# Patient Record
Sex: FEMALE | Race: WHITE | Employment: OTHER | URBAN - METROPOLITAN AREA
[De-identification: names, ages, dates, MRNs, and addresses within clinical notes are randomized per-mention and may not be internally consistent; named-entity substitution may affect disease eponyms.]

---

## 2017-05-04 ENCOUNTER — HOSPITAL ENCOUNTER (OUTPATIENT)
Dept: LAB | Age: 67
Discharge: HOME | End: 2017-05-04

## 2017-05-04 PROCEDURE — 88305 TISSUE EXAM BY PATHOLOGIST: CPT | Performed by: OPHTHALMOLOGY

## 2017-08-29 ENCOUNTER — HOSPITAL ENCOUNTER (OUTPATIENT)
Dept: MAMMOGRAPHY | Age: 67
Discharge: HOME | End: 2017-08-29
Payer: MEDICARE

## 2017-08-29 DIAGNOSIS — Z12.31 VISIT FOR SCREENING MAMMOGRAM: ICD-10-CM

## 2017-08-29 PROCEDURE — G0202 SCR MAMMO BI INCL CAD: HCPCS

## 2018-09-04 ENCOUNTER — HOSPITAL ENCOUNTER (OUTPATIENT)
Dept: MAMMOGRAPHY | Age: 68
Discharge: HOME | End: 2018-09-04
Payer: MEDICARE

## 2018-09-04 DIAGNOSIS — Z12.31 VISIT FOR SCREENING MAMMOGRAM: ICD-10-CM

## 2018-09-04 PROCEDURE — 77067 SCR MAMMO BI INCL CAD: CPT

## 2018-09-18 ENCOUNTER — HOSPITAL ENCOUNTER (OUTPATIENT)
Dept: MAMMOGRAPHY | Age: 68
Discharge: HOME | End: 2018-09-18
Attending: OBSTETRICS & GYNECOLOGY
Payer: MEDICARE

## 2018-09-18 ENCOUNTER — HOSPITAL ENCOUNTER (OUTPATIENT)
Dept: ULTRASOUND IMAGING | Age: 68
Discharge: HOME | End: 2018-09-18
Attending: OBSTETRICS & GYNECOLOGY
Payer: MEDICARE

## 2018-09-18 DIAGNOSIS — R92.2 DENSE BREAST TISSUE ON MAMMOGRAM: ICD-10-CM

## 2018-09-18 PROCEDURE — 77065 DX MAMMO INCL CAD UNI: CPT

## 2019-09-23 ENCOUNTER — HOSPITAL ENCOUNTER (OUTPATIENT)
Dept: MAMMOGRAPHY | Age: 69
Discharge: HOME | End: 2019-09-23
Payer: MEDICARE

## 2019-09-23 DIAGNOSIS — Z12.39 SCREENING BREAST EXAMINATION: ICD-10-CM

## 2019-09-23 PROCEDURE — 77067 SCR MAMMO BI INCL CAD: CPT

## 2020-06-23 ENCOUNTER — OFFICE VISIT (OUTPATIENT)
Dept: URGENT CARE | Age: 70
End: 2020-06-23

## 2020-06-23 VITALS — HEART RATE: 69 BPM | RESPIRATION RATE: 16 BRPM | TEMPERATURE: 98.1 F | OXYGEN SATURATION: 96 %

## 2020-06-23 DIAGNOSIS — Z11.59 SCREENING FOR VIRAL DISEASE: ICD-10-CM

## 2020-06-23 DIAGNOSIS — R50.9 SUBJECTIVE FEVER: ICD-10-CM

## 2020-06-23 DIAGNOSIS — R06.02 SHORTNESS OF BREATH: ICD-10-CM

## 2020-06-23 DIAGNOSIS — R07.81 PLEURITIC CHEST PAIN: Primary | ICD-10-CM

## 2020-06-23 DIAGNOSIS — J02.9 SORE THROAT: ICD-10-CM

## 2020-06-23 RX ORDER — DOXYCYCLINE 100 MG/1
100 CAPSULE ORAL 2 TIMES DAILY
Qty: 20 CAP | Refills: 0 | Status: SHIPPED | OUTPATIENT
Start: 2020-06-23 | End: 2020-07-03

## 2020-06-23 RX ORDER — CARVEDILOL 12.5 MG/1
TABLET ORAL
COMMUNITY
Start: 2019-12-02

## 2020-06-23 RX ORDER — MELOXICAM 15 MG/1
15 TABLET ORAL DAILY
COMMUNITY

## 2020-06-23 RX ORDER — METFORMIN HYDROCHLORIDE 500 MG/1
TABLET ORAL
COMMUNITY

## 2020-06-23 RX ORDER — GUAIFENESIN 100 MG/5ML
81 LIQUID (ML) ORAL DAILY
COMMUNITY

## 2020-06-23 NOTE — PROGRESS NOTES
This patient was seen in [facility] at [address] [place] [address] Urgent Care while in their vehicle due to COVID-19 pandemic with PPE and focused examination in order to decrease community viral transmission. The patient/guardian gave verbal consent to treat. Ting Oswald is a 79 y.o. female who presents SOB, slight cough, and pain with coughing and deep breathing x 3 days. Reports subjective fever. No known COVID-19 contact. Denies dizziness, palpitations. PMH: MVP, HTN, GERD. Non-smoker. The history is provided by the patient.         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    CAD (coronary artery disease)     MVP    Fibromyalgia     GERD (gastroesophageal reflux disease)     Hypertension     Nausea & vomiting     Pancreatitis     Plantar fasciitis     Thyroid disease     TMJ (dislocation of temporomandibular joint)         Past Surgical History:   Procedure Laterality Date    COLONOSCOPY,DIAGNOSTIC  10/1/2015         HX APPENDECTOMY      HX CHOLECYSTECTOMY      HX GYN      d & c    HX ORTHOPAEDIC  2/16/15    LEFT TOTAL KNEE ARTHROPLASTY WITH NAVIGATION     HX TONSILLECTOMY      HX UROLOGICAL      suburethral sling    TMJ ARTHROSCOPY/SURGERY      TOTAL KNEE ARTHROPLASTY  2014/June    right         Family History   Problem Relation Age of Onset    Delayed Awakening Father     Breast Cancer Mother 58    Breast Cancer Maternal Grandmother         Social History     Socioeconomic History    Marital status: UNKNOWN     Spouse name: Not on file    Number of children: Not on file    Years of education: Not on file    Highest education level: Not on file   Occupational History    Not on file   Social Needs    Financial resource strain: Not on file    Food insecurity     Worry: Not on file     Inability: Not on file    Transportation needs     Medical: Not on file Non-medical: Not on file   Tobacco Use    Smoking status: Never Smoker    Smokeless tobacco: Never Used   Substance and Sexual Activity    Alcohol use: Yes     Alcohol/week: 2.5 standard drinks     Types: 3 Glasses of wine per week     Comment: 3 glasses week    Drug use: No    Sexual activity: Not on file   Lifestyle    Physical activity     Days per week: Not on file     Minutes per session: Not on file    Stress: Not on file   Relationships    Social connections     Talks on phone: Not on file     Gets together: Not on file     Attends Orthodox service: Not on file     Active member of club or organization: Not on file     Attends meetings of clubs or organizations: Not on file     Relationship status: Not on file    Intimate partner violence     Fear of current or ex partner: Not on file     Emotionally abused: Not on file     Physically abused: Not on file     Forced sexual activity: Not on file   Other Topics Concern    Not on file   Social History Narrative    Not on file                ALLERGIES: Codeine; Erythromycin; and Sulfa (sulfonamide antibiotics)    Review of Systems   Constitutional: Positive for fever. Negative for activity change, appetite change and chills. HENT: Negative for congestion, rhinorrhea and sore throat. Respiratory: Positive for cough, chest tightness and shortness of breath. Negative for wheezing. Cardiovascular: Positive for chest pain. Negative for palpitations. Gastrointestinal: Negative for abdominal pain, diarrhea, nausea and vomiting. Musculoskeletal: Negative for myalgias. Neurological: Negative for dizziness and headaches. Vitals:    06/23/20 1525   Pulse: 69   Resp: 16   Temp: 98.1 °F (36.7 °C)   SpO2: 96%       Physical Exam  Vitals signs and nursing note reviewed. Constitutional:       General: She is not in acute distress. Appearance: She is well-developed. She is not diaphoretic.    Pulmonary:      Effort: Pulmonary effort is normal. No respiratory distress. Breath sounds: Normal breath sounds. No stridor. No wheezing, rhonchi or rales. Chest:      Chest wall: Tenderness present. Neurological:      Mental Status: She is alert. Psychiatric:         Behavior: Behavior normal.         Thought Content: Thought content normal.         Judgment: Judgment normal.         [facility]    ICD-10-CM ICD-9-CM   1. Pleuritic chest pain R07.81 786.52   2. Shortness of breath R06.02 786.05   3. Sore throat J02.9 462   4. Subjective fever R50.9 780.60   5. Screening for viral disease Z11.59 V73.99       Orders Placed This Encounter    NOVEL CORONAVIRUS (COVID-19)     Scheduling Instructions:      1) Due to current limited availability of the COVID-19 PCR test, tests will be prioritized and may not be completed.              2) Order only if the test result will change clinical management or necessary for a return to mission-critical employment decision.              3) Print and instruct patient to adhere to [facility] home isolation program. (Link Above)              4) Set up or refer patient for a monitoring program.              5) Have patient sign up for and leverage MyChart (if not previously done). Order Specific Question:   Status     Answer:   Symptomatic/Infection Suspected    doxycycline (VIBRAMYCIN) 100 mg capsule     Sig: Take 1 Cap by mouth two (2) times a day for 10 days. Dispense:  20 Cap     Refill:  0      Possible bronchitis    Self Quarantine  Deep breathing exercises  Tylenol prn  Increase fluids    If signs and symptoms become worse the pt is to go to the ER.          Procedures

## 2020-06-26 LAB — SARS-COV-2, NAA: NOT DETECTED

## 2020-07-31 ENCOUNTER — HOSPITAL ENCOUNTER (EMERGENCY)
Age: 70
Discharge: HOME | End: 2020-07-31
Attending: EMERGENCY MEDICINE
Payer: MEDICARE

## 2020-07-31 ENCOUNTER — APPOINTMENT (OUTPATIENT)
Dept: CT IMAGING | Age: 70
End: 2020-07-31
Attending: EMERGENCY MEDICINE
Payer: MEDICARE

## 2020-07-31 ENCOUNTER — APPOINTMENT (OUTPATIENT)
Dept: GENERAL RADIOLOGY | Age: 70
End: 2020-07-31
Attending: EMERGENCY MEDICINE
Payer: MEDICARE

## 2020-07-31 VITALS
OXYGEN SATURATION: 98 % | TEMPERATURE: 97.5 F | HEIGHT: 63 IN | WEIGHT: 177.25 LBS | HEART RATE: 80 BPM | BODY MASS INDEX: 31.41 KG/M2 | RESPIRATION RATE: 16 BRPM | SYSTOLIC BLOOD PRESSURE: 121 MMHG | DIASTOLIC BLOOD PRESSURE: 84 MMHG

## 2020-07-31 DIAGNOSIS — M47.816 ARTHRITIS OF LUMBAR SPINE: ICD-10-CM

## 2020-07-31 DIAGNOSIS — R04.0 EPISTAXIS: ICD-10-CM

## 2020-07-31 DIAGNOSIS — S02.2XXA CLOSED FRACTURE OF NASAL SEPTUM, INITIAL ENCOUNTER: ICD-10-CM

## 2020-07-31 DIAGNOSIS — M47.812 CERVICAL ARTHRITIS: ICD-10-CM

## 2020-07-31 DIAGNOSIS — S02.2XXA CLOSED FRACTURE OF NASAL BONE, INITIAL ENCOUNTER: ICD-10-CM

## 2020-07-31 DIAGNOSIS — S09.90XA CLOSED HEAD INJURY, INITIAL ENCOUNTER: Primary | ICD-10-CM

## 2020-07-31 DIAGNOSIS — T14.8XXA ABRASION: ICD-10-CM

## 2020-07-31 PROCEDURE — 70486 CT MAXILLOFACIAL W/O DYE: CPT

## 2020-07-31 PROCEDURE — 72125 CT NECK SPINE W/O DYE: CPT

## 2020-07-31 PROCEDURE — 99283 EMERGENCY DEPT VISIT LOW MDM: CPT

## 2020-07-31 PROCEDURE — 74011250637 HC RX REV CODE- 250/637: Performed by: EMERGENCY MEDICINE

## 2020-07-31 PROCEDURE — 70450 CT HEAD/BRAIN W/O DYE: CPT

## 2020-07-31 PROCEDURE — 72100 X-RAY EXAM L-S SPINE 2/3 VWS: CPT

## 2020-07-31 RX ORDER — ONDANSETRON 4 MG/1
4 TABLET, ORALLY DISINTEGRATING ORAL
Status: COMPLETED | OUTPATIENT
Start: 2020-07-31 | End: 2020-07-31

## 2020-07-31 RX ORDER — METHOCARBAMOL 750 MG/1
750 TABLET, FILM COATED ORAL
Qty: 20 TAB | Refills: 0 | Status: SHIPPED | OUTPATIENT
Start: 2020-07-31

## 2020-07-31 RX ORDER — TRAMADOL HYDROCHLORIDE 50 MG/1
50 TABLET ORAL
Status: COMPLETED | OUTPATIENT
Start: 2020-07-31 | End: 2020-07-31

## 2020-07-31 RX ORDER — TRAMADOL HYDROCHLORIDE 50 MG/1
50 TABLET ORAL
Qty: 10 TAB | Refills: 0 | Status: SHIPPED | OUTPATIENT
Start: 2020-07-31 | End: 2020-08-03

## 2020-07-31 RX ADMIN — ONDANSETRON 4 MG: 4 TABLET, ORALLY DISINTEGRATING ORAL at 15:02

## 2020-07-31 RX ADMIN — TRAMADOL HYDROCHLORIDE 50 MG: 50 TABLET, FILM COATED ORAL at 15:02

## 2020-07-31 NOTE — ED NOTES
Pt in via EMS for GLF. Pt states she tripped over painting plastic and fell face first into the tailgate of her pickup truck. Pt state she has 6/10 pain in both the face and lower back from the direction of the fall. Pt has several small lacerations  to the bridge of the nose, forehead and upper lip. Pt is monitored X 2.

## 2020-07-31 NOTE — DISCHARGE INSTRUCTIONS
Patient Education        Scrapes (Abrasions): Care Instructions  Your Care Instructions  Scrapes (abrasions) are wounds where your skin has been rubbed or torn off. Most scrapes do not go deep into the skin, but some may remove several layers of skin. Scrapes usually don't bleed much, but they may ooze pinkish fluid. Scrapes on the head or face may appear worse than they are. They may bleed a lot because of the good blood supply to this area. Most scrapes heal well and may not need a bandage. They usually heal within 3 to 7 days. A large, deep scrape may take 1 to 2 weeks or longer to heal. A scab may form on some scrapes. Follow-up care is a key part of your treatment and safety. Be sure to make and go to all appointments, and call your doctor if you are having problems. It's also a good idea to know your test results and keep a list of the medicines you take. How can you care for yourself at home? · If your doctor told you how to care for your wound, follow your doctor's instructions. If you did not get instructions, follow this general advice:  ? Wash the scrape with clean water 2 times a day. Don't use hydrogen peroxide or alcohol, which can slow healing. ? You may cover the scrape with a thin layer of petroleum jelly, such as Vaseline, and a nonstick bandage. ? Apply more petroleum jelly and replace the bandage as needed. · Prop up the injured area on a pillow anytime you sit or lie down during the next 3 days. Try to keep it above the level of your heart. This will help reduce swelling. · Be safe with medicines. Take pain medicines exactly as directed. ? If the doctor gave you a prescription medicine for pain, take it as prescribed. ? If you are not taking a prescription pain medicine, ask your doctor if you can take an over-the-counter medicine. When should you call for help?    Call your doctor now or seek immediate medical care if:  · You have signs of infection, such as:  ? Increased pain, swelling, warmth, or redness around the scrape. ? Red streaks leading from the scrape. ? Pus draining from the scrape. ? A fever. · The scrape starts to bleed, and blood soaks through the bandage. Oozing small amounts of blood is normal.  Watch closely for changes in your health, and be sure to contact your doctor if the scrape is not getting better each day. Where can you learn more? Go to http://www.gray.com/  Enter A374 in the search box to learn more about \"Scrapes (Abrasions): Care Instructions. \"  Current as of: June 26, 2019               Content Version: 12.5  © 7702-5725 [employer]. Care instructions adapted under license by [employer] (which disclaims liability or warranty for this information). If you have questions about a medical condition or this instruction, always ask your healthcare professional. [place] [address] any warranty or liability for your use of this information. Patient Education        Cervical Spondylosis: Care Instructions  Your Care Instructions     Cervical spondylosis is a type of arthritis of the neck. It can happen as people get older. It may be caused by bone spurs or other problems. You may have neck pain and stiffness. Sometimes the space around the spinal cord narrows. When this happens, it is called spinal stenosis. Spinal stenosis can cause pain, numbness, or weakness in the arms, legs, feet, and rear end (buttocks). It can also cause loss of bowel and bladder control. You can treat some of your symptoms with over-the-counter pain medicine. But if you have spinal stenosis with severe symptoms, you may need surgery. Follow-up care is a key part of your treatment and safety. Be sure to make and go to all appointments, and call your doctor if you are having problems. It's also a good idea to know your test results and keep a list of the medicines you take.   How can you care for yourself at home?  · Take anti-inflammatory medicines to reduce neck pain. These include ibuprofen (Advil, Motrin) and naproxen (Aleve). Be safe with medicines. Read and follow all instructions on the label. · Follow your doctor's recommendation about activity. He or she may tell you not to do sports or activities that could injure your neck. When should you call for help? [id number] anytime you think you may need emergency care. For example, call if:  · You are unable to move an arm or a leg at all. Call your doctor now or seek immediate medical care if:  · You have new or worse symptoms in your arms, legs, belly, or buttocks. Symptoms may include:  ? Numbness or tingling. ? Weakness. ? Pain. · You lose bladder or bowel control. Watch closely for changes in your health, and be sure to contact your doctor if:  · You do not get better as expected. Where can you learn more? Go to http://jenniferMWImacrina.info/  Enter Y307 in the search box to learn more about \"Cervical Spondylosis: Care Instructions. \"  Current as of: March 2, 2020               Content Version: 12.5  © 3539-0962 [employer]. Care instructions adapted under license by [employer] (which disclaims liability or warranty for this information). If you have questions about a medical condition or this instruction, always ask your healthcare professional. [address] any warranty or liability for your use of this information. Patient Education        Facial Fracture: Care Instructions  Your Care Instructions  You have broken (fractured) one or more bones in your face. Swelling and bruising from the injury are likely to get worse over the first couple of days. After that, the swelling should steadily improve until it is gone. If you have bruises on your face, they may change as they heal. The skin may turn from black and blue to green to yellow or brown before it returns to its normal color.  It may take several weeks for your injury to heal.  It is very important that you get follow-up care as directed so that the injury heals properly and does not lead to problems. The kind of care and treatment you will need depends on the specific type of break (or breaks) you have. You heal best when you take good care of yourself. Eat a variety of healthy foods, and don't smoke. Follow-up care is a key part of your treatment and safety. Be sure to make and go to all appointments, and call your doctor if you are having problems. It's also a good idea to know your test results and keep a list of the medicines you take. How can you care for yourself at home? · Put ice or a cold pack on your injury for 10 to 20 minutes at a time. Try to do this every 1 to 2 hours for the next 3 days (when you are awake) or until the swelling goes down. Put a thin cloth between the ice pack and your skin. · Go to all follow-up appointments with your doctor. Your doctor will determine whether you need further treatment, including surgery. · Take your medicines exactly as prescribed. Call your doctor if you think you are having a problem with your medicine. You will get more details on the specific medicines your doctor prescribes. · If your doctor prescribed antibiotics, take them exactly as directed. Do not stop taking them just because you feel better. You need to take the full course of antibiotics. · Be safe with medicines. Read and follow all instructions on the label. ? If the doctor gave you a prescription medicine for pain, take it as prescribed. ? If you are not taking a prescription pain medicine, ask your doctor if you can take an over-the-counter medicine. · Keep your head elevated when you sleep. · Eat soft food to decrease jaw pain. · Do not blow your nose. Dab it with a tissue if you need to. When should you call for help? [id number] anytime you think you may need emergency care.  For example, call if:  · You have a seizure. · You passed out (lost consciousness). · You have tingling, weakness, or numbness on one side of your body. Call your doctor now or seek immediate medical care if:  · You have a severe headache. · You develop double vision. · You have a fever and stiff neck. · Clear, watery fluid drains from your nose. · You feel dizzy or lightheaded. · You have new eye pain or changes in your vision, such as blurring. · You have new ear pain, ringing in your ears, or trouble hearing. · You are confused, irritable, or not acting normally. · You have a hard time standing, walking, or talking. · You have new mouth or tooth pain, or you have trouble chewing. · You have increasing pain even after you have taken your pain medicine. Watch closely for changes in your health, and be sure to contact your doctor if:  · You develop a cough, cold, or sinus infection. · The symptoms from your injury are not steadily improving. Where can you learn more? Go to http://jennifer-macrina.info/  Enter M349 in the search box to learn more about \"Facial Fracture: Care Instructions. \"  Current as of: November 20, 2019               Content Version: 12.5  © 7607-7551 [employer]. Care instructions adapted under license by [employer] (which disclaims liability or warranty for this information). If you have questions about a medical condition or this instruction, always ask your healthcare professional. [place] [address] any warranty or liability for your use of this information. Patient Education        Learning About a Closed Head Injury  What is a closed head injury? A closed head injury happens when your head gets hit hard. The strong force of the blow causes your brain to shake in your skull. This movement can cause the brain to bruise, swell, or tear. Sometimes nerves or blood vessels also get damaged. This can cause bleeding in or around the brain.   A concussion is a type of closed head injury. What are the symptoms? If you have a mild concussion, you may have a mild headache or feel \"not quite right. \" These symptoms are common. They usually go away over a few days to 4 weeks. But sometimes after a concussion, you feel like you can't function as well as before the injury. And you have new symptoms. This is called postconcussive syndrome. You may:  · Find it harder to solve problems, think, concentrate, or remember. · Have headaches. · Have changes in your sleep patterns, such as not being able to sleep or sleeping all the time. · Have changes in your personality. · Not be interested in your usual activities. · Feel angry or anxious without a clear reason. · Lose your sense of taste or smell. · Be dizzy, lightheaded, or unsteady. It may be hard to stand or walk. How is a closed head injury treated? Any person who may have a concussion needs to see a doctor. Some people have to stay in the hospital to be watched. Others can go home safely. If you go home, follow your doctor's instructions. He or she will tell you if you need someone to watch you closely for the next 24 hours or longer. Rest is the best treatment. Get plenty of sleep at night. And try to rest during the day. · Avoid activities that are physically or mentally demanding. These include housework, exercise, and schoolwork. And don't play video games, send text messages, or use the computer. You may need to change your school or work schedule to be able to avoid these activities. · Ask your doctor when it's okay to drive, ride a bike, or operate machinery. · Take an over-the-counter pain medicine, such as acetaminophen (Tylenol), ibuprofen (Advil, Motrin), or naproxen (Aleve). Be safe with medicines. Read and follow all instructions on the label. · Check with your doctor before you use any other medicines for pain. · Do not drink alcohol or use illegal drugs. They can slow recovery. They can also increase your risk of getting a second head injury. Follow-up care is a key part of your treatment and safety. Be sure to make and go to all appointments, and call your doctor if you are having problems. It's also a good idea to know your test results and keep a list of the medicines you take. Where can you learn more? Go to http://jennifer-macrina.info/  Enter E235 in the search box to learn more about \"Learning About a Closed Head Injury. \"  Current as of: November 20, 2019               Content Version: 12.5  © 8919-6946 [employer]. Care instructions adapted under license by [employer] (which disclaims liability or warranty for this information). If you have questions about a medical condition or this instruction, always ask your healthcare professional. [address] any warranty or liability for your use of this information. Patient Education        Nosebleeds: Care Instructions  Your Care Instructions     Nosebleeds are common, especially if you have colds or allergies. Many things can cause a nosebleed. Some nosebleeds stop on their own with pressure. Others need packing. Some get cauterized (sealed). If you have gauze or other packing materials in your nose, you will need to follow up with your doctor to have the packing removed. You may need more treatment if you get nosebleeds a lot. The doctor has checked you carefully, but problems can develop later. If you notice any problems or new symptoms, get medical treatment right away. Follow-up care is a key part of your treatment and safety. Be sure to make and go to all appointments, and call your doctor if you are having problems. It's also a good idea to know your test results and keep a list of the medicines you take. How can you care for yourself at home? · If you get another nosebleed:  ? Sit up and tilt your head slightly forward.  This keeps blood from going down your throat. ? Use your thumb and index finger to pinch your nose shut for 10 minutes. Use a clock. Do not check to see if the bleeding has stopped before the 10 minutes are up. If the bleeding has not stopped, pinch your nose shut for another 10 minutes. ? When the bleeding has stopped, try not to pick, rub, or blow your nose for 12 hours. Avoiding these things helps keep your nose from bleeding again. · If your doctor prescribed antibiotics, take them as directed. Do not stop taking them just because you feel better. You need to take the full course of antibiotics. To prevent nosebleeds  · Do not blow your nose too hard. · Try not to lift or strain after a nosebleed. · Raise your head on a pillow while you sleep. · Put a thin layer of a saline- or water-based nasal gel, such as NasoGel, inside your nose. Put it on the septum, which divides your nostrils. This will prevent dryness that can cause nosebleeds. · Use a vaporizer or humidifier to add moisture to your bedroom. Follow the directions for cleaning the machine. · Do not use aspirin, ibuprofen (Advil, Motrin), or naproxen (Aleve) for 36 to 48 hours after a nosebleed unless your doctor tells you to. You can use acetaminophen (Tylenol) for pain relief. · Talk to your doctor about stopping any other medicines you are taking. Some medicines may make you more likely to get a nosebleed. · Do not use cold medicines or nasal sprays without first talking to your doctor. They can make your nose dry. When should you call for help? [id number] anytime you think you may need emergency care. For example, call if:  · You passed out (lost consciousness). Call your doctor now or seek immediate medical care if:  · You get another nosebleed and your nose is still bleeding after you have applied pressure 3 times for 10 minutes each time (30 minutes total).   · There is a lot of blood running down the back of your throat even after you pinch your nose and tilt your head forward. · You have a fever. · You have sinus pain. Watch closely for changes in your health, and be sure to contact your doctor if:  · You get nosebleeds often, even if they stop. · You do not get better as expected. Where can you learn more? Go to http://www.duvall.com/  Enter S156 in the search box to learn more about \"Nosebleeds: Care Instructions. \"  Current as of: June 26, 2019               Content Version: 12.5  © 3722-6897 [employer]. Care instructions adapted under license by [employer] (which disclaims liability or warranty for this information). If you have questions about a medical condition or this instruction, always ask your healthcare professional. [address] any warranty or liability for your use of this information. Patient Education        Broken Nose: Care Instructions  Your Care Instructions     A broken nose is a break, or fracture, of the bone or cartilage. Most broken noses need only home care and a follow-up visit with a doctor. The swelling should go down in a few days. Bruises around your eyes and nose should go away in 2 to 3 weeks. You heal best when you take good care of yourself. Eat a variety of healthy foods, and don't smoke. Follow-up care is a key part of your treatment and safety. Be sure to make and go to all appointments, and call your doctor if you are having problems. It's also a good idea to know your test results and keep a list of the medicines you take. How can you care for yourself at home? · If you have a nasal splint or packing, leave it in place until a doctor removes it. · If your doctor prescribed antibiotics, take them as directed. Do not stop taking them just because you feel better. You need to take the full course of antibiotics. · Take decongestants as directed to help you breathe after the splint or packing is removed.  Your doctor may give you a prescription or suggest over-the-counter medicine. · Be safe with medicines. Take pain medicines exactly as directed. ? If the doctor gave you a prescription medicine for pain, take it as prescribed. ? If you are not taking a prescription pain medicine, ask your doctor if you can take an over-the-counter medicine. · Put ice or a cold pack on your nose for 10 to 20 minutes at a time. Try to do this every 1 to 2 hours for the first 3 days (when you are awake) or until the swelling goes down. Put a thin cloth between the ice pack and your skin. · Sleep with your head slightly raised until the swelling goes down. Prop up your head and shoulders on pillows. · Do not play contact sports for 6 weeks. When should you call for help? [id number] anytime you think you may need emergency care. For example, call if:  · You have trouble breathing. · You passed out (lost consciousness). Call your doctor now or seek immediate medical care if:  · You have signs of infection, such as:  ? Increased pain, swelling, warmth, or redness. ? Red streaks leading from the area. ? Pus draining from the area. ? A fever. · You have clear fluid draining from your nose. · You have vision changes. · Your nose is bleeding. · You have new or worse pain. Watch closely for changes in your health, and be sure to contact your doctor if:  · You do not get better as expected. Where can you learn more? Go to http://www.gray.com/  Enter Y345 in the search box to learn more about \"Broken Nose: Care Instructions. \"  Current as of: March 2, 2020               Content Version: 12.5  © 3028-8625 [employer]. Care instructions adapted under license by [employer] (which disclaims liability or warranty for this information).  If you have questions about a medical condition or this instruction, always ask your healthcare professional. [place] [address] any warranty or liability for your use of this information.

## 2020-07-31 NOTE — ED PROVIDER NOTES
EMERGENCY DEPARTMENT HISTORY AND PHYSICAL EXAM      Date: 7/31/2020  Patient Name: Lary Wiseman    History of Presenting Illness     Chief Complaint   Patient presents with   [facility]     Patient tripped and fell face first on an open flatbed of a pickup       History Provided By: Patient and EMS    HPI: Lary Wiseman, 79 y.o. female presents to the ED with cc of fall. The patient was moving some things in her flatbed truck she tripped over some plastic and fell on her face. She denies loss of consciousness. She is on a baby aspirin but no other blood thinners. Her pain is a 7 out of 10 and involves the nose, forehead, lower back. She states she has intermittent low back pain which worsens with exertion. She has mild neck pain and moderate headache. She has nausea but denies vomiting. She denies blurred vision, numbness or tingling or dizziness. She is up-to-date with her tetanus. There are no other complaints, changes, or physical findings at this time. PCP: Gonzalo Chambers MD    No current facility-administered medications on file prior to encounter. Current Outpatient Medications on File Prior to Encounter   Medication Sig Dispense Refill    carvediloL (COREG) 12.5 mg tablet carvedilol 12.5 mg tablet      metFORMIN (GLUCOPHAGE) 500 mg tablet metformin 500 mg tablet      aspirin 81 mg chewable tablet Take 81 mg by mouth daily.  ferrous sulfate (IRON PO) Take  by mouth.  meloxicam (MOBIC) 15 mg tablet Take 15 mg by mouth daily.  amLODIPine (NORVASC) 10 mg tablet Take 1 Tab by mouth every morning. DO NOT TAKE FOR BLOOD PRESSURE LESS THAN 130/80 1 Tab 0    losartan (COZAAR) 100 mg tablet Take 1 Tab by mouth every morning. DO NOT TAKE FOR BLOOD PRESSURE LESS THAN 130/80 1 Tab 0    TURMERIC ROOT EXTRACT PO Take 2 tablets by mouth Daily (before dinner).  melatonin 10 mg tab Take  by mouth nightly.       multivitamin (ONE A DAY) tablet Take 0.5 tablets by mouth two (2) times a day.  FUROSEMIDE PO Take 10 mg by mouth every morning.  venlafaxine-SR (EFFEXOR XR) 150 mg capsule Take 150 mg by mouth two (2) times a day. 150 in the morning, 75 at night time      gabapentin (NEURONTIN) 600 mg tablet Take 600 mg by mouth three (3) times daily. Indications: 1200 mg in AM; 1800 mg in PM      magnesium oxide 500 mg tab Take 500 mg by mouth daily.  levothyroxine (LEVOXYL) 88 mcg tablet Take 88 mcg by mouth Daily (before breakfast).  triamcinolone acetonide (KENALOG) 0.1 % dental paste by Dental route as needed.  omeprazole (PRILOSEC) 20 mg capsule Take 20 mg by mouth every morning.  potassium chloride SA (MICRO-K) 10 mEq capsule Take 10 mEq by mouth daily (with dinner). 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    CAD (coronary artery disease)     MVP    Fibromyalgia     GERD (gastroesophageal reflux disease)     Hypertension     Nausea & vomiting     Pancreatitis     Plantar fasciitis     Thyroid disease     TMJ (dislocation of temporomandibular joint)        Past Surgical History:  Past Surgical History:   Procedure Laterality Date    COLONOSCOPY,DIAGNOSTIC  10/1/2015         HX APPENDECTOMY      HX CHOLECYSTECTOMY      HX GYN      d & c    HX ORTHOPAEDIC  2/16/15    LEFT TOTAL KNEE ARTHROPLASTY WITH NAVIGATION     HX TONSILLECTOMY      HX UROLOGICAL      suburethral sling    TMJ ARTHROSCOPY/SURGERY      TOTAL KNEE ARTHROPLASTY  2014/June    right       Family History:  Family History   Problem Relation Age of Onset    Delayed Awakening Father     Breast Cancer Mother 58    Breast Cancer Maternal Grandmother        Social History:  Social History     Tobacco Use    Smoking status: Never Smoker    Smokeless tobacco: Never Used   Substance Use Topics    Alcohol use:  Yes Alcohol/week: 2.5 standard drinks     Types: 3 Glasses of wine per week     Comment: 3 glasses week    Drug use: No       Allergies: Allergies   Allergen Reactions    Codeine Other (comments)     Makes her foggy in the head    Erythromycin Nausea and Vomiting    Sulfa (Sulfonamide Antibiotics) Rash         Review of Systems   Review of Systems   Constitutional: Negative for fever. HENT: Negative for congestion. Eyes: Negative. Respiratory: Negative for shortness of breath. Cardiovascular: Negative for chest pain. Gastrointestinal: Negative for abdominal pain. Endocrine: Negative for heat intolerance. Genitourinary: Negative. Musculoskeletal: Positive for back pain and neck pain. Skin: Positive for wound. Allergic/Immunologic: Negative for immunocompromised state. Neurological: Positive for headaches. Hematological: Does not bruise/bleed easily. Psychiatric/Behavioral: Negative. All other systems reviewed and are negative. Physical Exam   Physical Exam  Vitals signs and nursing note reviewed. Constitutional:       General: She is not in acute distress. Appearance: She is well-developed. HENT:      Head: Normocephalic. Nose:      Comments: Evidence of recent bleeding to naris, edema noted involving the nose, forehead tender to palpation, contusion left periorbital region, abrasions to the nose,no septal hematoma  Eyes:      Extraocular Movements: Extraocular movements intact. Pupils: Pupils are equal, round, and reactive to light. Neck:      Musculoskeletal: Normal range of motion and neck supple. Muscular tenderness present. Cardiovascular:      Rate and Rhythm: Normal rate and regular rhythm. Heart sounds: Normal heart sounds. Pulmonary:      Effort: Pulmonary effort is normal.      Breath sounds: Normal breath sounds. Abdominal:      General: Bowel sounds are normal.      Palpations: Abdomen is soft. Tenderness:  There is no abdominal tenderness. Comments: Lumbar tenderness   Musculoskeletal: Normal range of motion. Skin:     General: Skin is warm and dry. Neurological:      General: No focal deficit present. Mental Status: She is alert and oriented to person, place, and time. Psychiatric:         Mood and Affect: Mood normal.         Behavior: Behavior normal.         Diagnostic Study Results     Labs -   No results found for this or any previous visit (from the past 12 hour(s)). Radiologic Studies -   XR SPINE LUMB 2 OR 3 V   Final Result   IMPRESSION: Severe DJD no acute fracture. CT SPINE CERV WO CONT   Final Result   IMPRESSION: Mild degenerative disc and joint disease, no acute fracture or   dislocation. CT HEAD WO CONT   Final Result   IMPRESSION: No acute intracranial findings. CT MAXILLOFACIAL WO CONT   Final Result   IMPRESSION:    Bilateral nasal fractures and fracture of the nasal septum. No other fractures. Paranasal sinuses clear. Orbital structures intact. CT Results  (Last 48 hours)               07/31/20 1223  CT MAXILLOFACIAL WO CONT Final result    Impression:  IMPRESSION:    Bilateral nasal fractures and fracture of the nasal septum. No other fractures. Paranasal sinuses clear. Orbital structures intact. Narrative:  EXAM: CT MAXILLOFACIAL WO CONT       INDICATION: trauma       COMPARISON: None. CONTRAST:   None. TECHNIQUE:  Multislice helical CT of the facial bones was performed in the axial   plane without intravenous contrast administration. Coronal and sagittal   reformations were generated. CT dose reduction was achieved through use of a   standardized protocol tailored for this examination and automatic exposure   control for dose modulation. FINDINGS:       Bones: There are bilateral nasal bone fractures, with slight depression on the   right. There is also a slightly displaced fracture of the right anterior nasal   septum.  The nasal fractures involve the distal portions as well as the base of   the nasal bones. No other fractures are demonstrated. There is bilateral TMJ   arthropathy, left greater than right, with a subchondral cyst in the left   mandibular condyle. The visualized portion of the upper cervical spine is   normal..       Paranasal sinuses: Clear. Orbits: The globes, optic nerves, and extraocular muscles are within normal   limits. .       Base of brain and soft tissues: Within normal limits. No evidence of mass. .   Right frontal scalp hematoma. CXR Results  (Last 48 hours)    None          Medical Decision Making   I am the first provider for this patient. I reviewed the vital signs, available nursing notes, past medical history, past surgical history, family history and social history. Vital Signs-Reviewed the patient's vital signs. Patient Vitals for the past 12 hrs:   Temp Pulse Resp BP SpO2   07/31/20 1251  80 16 121/84 98 %   07/31/20 1201 97.5 °F (36.4 °C) 75 18 132/87 99 %           Records Reviewed: Nursing Notes, Old Medical Records, Ambulance Run Sheet, Previous Radiology Studies and Previous Laboratory Studies    Provider Notes (Medical Decision Making):   Fracture, sprain, contusion, intracranial hemorrhage, epistaxis    ED Course:   Initial assessment performed. The patients presenting problems have been discussed, and they are in agreement with the care plan formulated and outlined with them. I have encouraged them to ask questions as they arise throughout their visit. Progress note: The patient is feeling better. Her results have been reviewed. She is advised to follow-up and return to ER if worse           PROCEDURES      Critical Care Time:   0    Disposition:  home        DISCHARGE PLAN:  1.    Discharge Medication List as of 7/31/2020  3:34 PM      START taking these medications    Details   traMADoL (Ultram) 50 mg tablet Take 1 Tab by mouth every six (6) hours as needed for Pain for up to 3 days. Max Daily Amount: 200 mg., Normal, Disp-10 Tab,R-0      methocarbamoL (Robaxin-750) 750 mg tablet Take 1 Tab by mouth three (3) times daily as needed for Muscle Spasm(s). , Normal, Disp-20 Tab,R-0         CONTINUE these medications which have NOT CHANGED    Details   carvediloL (COREG) 12.5 mg tablet carvedilol 12.5 mg tablet, Historical Med      metFORMIN (GLUCOPHAGE) 500 mg tablet metformin 500 mg tablet, Historical Med      aspirin 81 mg chewable tablet Take 81 mg by mouth daily. , Historical Med      ferrous sulfate (IRON PO) Take  by mouth., Historical Med      meloxicam (MOBIC) 15 mg tablet Take 15 mg by mouth daily. , Historical Med      amLODIPine (NORVASC) 10 mg tablet Take 1 Tab by mouth every morning. DO NOT TAKE FOR BLOOD PRESSURE LESS THAN 130/80, No Print, Disp-1 Tab, R-0      losartan (COZAAR) 100 mg tablet Take 1 Tab by mouth every morning. DO NOT TAKE FOR BLOOD PRESSURE LESS THAN 130/80, No Print, Disp-1 Tab, R-0      TURMERIC ROOT EXTRACT PO Take 2 tablets by mouth Daily (before dinner). , Historical Med      melatonin 10 mg tab Take  by mouth nightly., Historical Med      multivitamin (ONE A DAY) tablet Take 0.5 tablets by mouth two (2) times a day., Historical Med      FUROSEMIDE PO Take 10 mg by mouth every morning., Historical Med      venlafaxine-SR (EFFEXOR XR) 150 mg capsule Take 150 mg by mouth two (2) times a day. 150 in the morning, 75 at night time, Historical Med      gabapentin (NEURONTIN) 600 mg tablet Take 600 mg by mouth three (3) times daily. Indications: 1200 mg in AM; 1800 mg in PM, Historical Med      magnesium oxide 500 mg tab Take 500 mg by mouth daily. , Historical Med      levothyroxine (LEVOXYL) 88 mcg tablet Take 88 mcg by mouth Daily (before breakfast). , Historical Med      triamcinolone acetonide (KENALOG) 0.1 % dental paste by Dental route as needed., Historical Med      omeprazole (PRILOSEC) 20 mg capsule Take 20 mg by mouth every morning., Historical Med potassium chloride SA (MICRO-K) 10 mEq capsule Take 10 mEq by mouth daily (with dinner). , Historical Med           2. Follow-up Information     Follow up With Specialties Details Why Contact Info    Daniella Briceño MD Internal Medicine In 3 days As needed [address] [facility]  [phone]      Janice Hester MD Otolaryngology In 3 days  [address]  [address] [phone]      [address] [facility] EMERGENCY DEPT Emergency Medicine  If symptoms worsen [address]  [address]   [address] [id number] [phone]        3. Return to ED if worse     Diagnosis     Clinical Impression:   1. Closed head injury, initial encounter    2. Cervical arthritis    3. Closed fracture of nasal septum, initial encounter    4. Closed fracture of nasal bone, initial encounter    5. Epistaxis    6. Abrasion    7. Arthritis of lumbar spine        Attestations:    Emeka Maki MD    Please note that this dictation was completed with [employer], the computer voice recognition software. Quite often unanticipated grammatical, syntax, homophones, and other interpretive errors are inadvertently transcribed by the computer software. Please disregard these errors. Please excuse any errors that have escaped final proofreading. Thank you.

## 2020-10-12 ENCOUNTER — TRANSCRIBE ORDER (OUTPATIENT)
Dept: SCHEDULING | Age: 70
End: 2020-10-12

## 2020-10-12 DIAGNOSIS — Z12.31 VISIT FOR SCREENING MAMMOGRAM: Primary | ICD-10-CM

## 2020-10-28 ENCOUNTER — OFFICE VISIT (OUTPATIENT)
Dept: URGENT CARE | Age: 70
End: 2020-10-28
Payer: MEDICARE

## 2020-10-28 VITALS — OXYGEN SATURATION: 96 % | HEART RATE: 70 BPM | RESPIRATION RATE: 16 BRPM | TEMPERATURE: 98.3 F

## 2020-10-28 DIAGNOSIS — Z20.822 ENCOUNTER FOR LABORATORY TESTING FOR COVID-19 VIRUS: Primary | ICD-10-CM

## 2020-10-28 PROCEDURE — 99211 OFF/OP EST MAY X REQ PHY/QHP: CPT | Performed by: FAMILY MEDICINE

## 2020-10-30 LAB — SARS-COV-2, NAA: NOT DETECTED

## 2020-11-02 ENCOUNTER — HOSPITAL ENCOUNTER (OUTPATIENT)
Dept: MAMMOGRAPHY | Age: 70
Discharge: HOME | End: 2020-11-02
Payer: MEDICARE

## 2020-11-02 DIAGNOSIS — Z12.31 VISIT FOR SCREENING MAMMOGRAM: ICD-10-CM

## 2020-11-02 PROCEDURE — 77067 SCR MAMMO BI INCL CAD: CPT | Performed by: OBSTETRICS & GYNECOLOGY

## 2021-08-03 ENCOUNTER — TRANSCRIBE ORDER (OUTPATIENT)
Dept: SCHEDULING | Age: 71
End: 2021-08-03

## 2021-08-03 DIAGNOSIS — Z12.31 SCREENING MAMMOGRAM FOR HIGH-RISK PATIENT: Primary | ICD-10-CM

## 2021-11-03 ENCOUNTER — HOSPITAL ENCOUNTER (OUTPATIENT)
Dept: MAMMOGRAPHY | Age: 71
Discharge: HOME | End: 2021-11-03
Attending: OBSTETRICS & GYNECOLOGY
Payer: MEDICARE

## 2021-11-03 DIAGNOSIS — Z12.31 SCREENING MAMMOGRAM FOR HIGH-RISK PATIENT: ICD-10-CM

## 2021-11-03 PROCEDURE — 77063 BREAST TOMOSYNTHESIS BI: CPT

## 2022-01-05 ENCOUNTER — TRANSCRIBE ORDER (OUTPATIENT)
Dept: SCHEDULING | Age: 72
End: 2022-01-05

## 2022-01-05 DIAGNOSIS — M47.816 LUMBAR SPONDYLOSIS: ICD-10-CM

## 2022-01-05 DIAGNOSIS — M41.25 IDIOPATHIC SCOLIOSIS OF THORACOLUMBAR REGION: ICD-10-CM

## 2022-01-05 DIAGNOSIS — G95.19 NEUROGENIC CLAUDICATION (HCC): Primary | ICD-10-CM

## 2022-01-20 ENCOUNTER — HOSPITAL ENCOUNTER (OUTPATIENT)
Dept: MRI IMAGING | Age: 72
Discharge: HOME | End: 2022-01-20
Attending: PHYSICAL MEDICINE & REHABILITATION
Payer: MEDICARE

## 2022-01-20 DIAGNOSIS — G95.19 NEUROGENIC CLAUDICATION (HCC): ICD-10-CM

## 2022-01-20 DIAGNOSIS — M47.816 LUMBAR SPONDYLOSIS: ICD-10-CM

## 2022-01-20 DIAGNOSIS — M41.25 IDIOPATHIC SCOLIOSIS OF THORACOLUMBAR REGION: ICD-10-CM

## 2022-01-20 PROCEDURE — 72148 MRI LUMBAR SPINE W/O DYE: CPT

## 2022-10-17 ENCOUNTER — TRANSCRIBE ORDER (OUTPATIENT)
Dept: SCHEDULING | Age: 72
End: 2022-10-17

## 2022-10-17 DIAGNOSIS — Z12.31 SCREENING MAMMOGRAM FOR HIGH-RISK PATIENT: Primary | ICD-10-CM

## 2022-11-07 ENCOUNTER — HOSPITAL ENCOUNTER (OUTPATIENT)
Dept: MAMMOGRAPHY | Age: 72
Discharge: HOME | End: 2022-11-07
Attending: OBSTETRICS & GYNECOLOGY
Payer: MEDICARE

## 2022-11-07 DIAGNOSIS — Z12.31 SCREENING MAMMOGRAM FOR HIGH-RISK PATIENT: ICD-10-CM

## 2022-11-07 PROCEDURE — 77063 BREAST TOMOSYNTHESIS BI: CPT

## 2023-02-16 ENCOUNTER — TRANSCRIBE ORDER (OUTPATIENT)
Dept: SCHEDULING | Age: 73
End: 2023-02-16

## 2023-02-16 DIAGNOSIS — M54.16 RADICULOPATHY OF LUMBAR REGION: ICD-10-CM

## 2023-02-16 DIAGNOSIS — M47.816 LUMBAR SPONDYLOSIS: ICD-10-CM

## 2023-02-16 DIAGNOSIS — M51.36 DDD (DEGENERATIVE DISC DISEASE), LUMBAR: ICD-10-CM

## 2023-02-16 DIAGNOSIS — M48.061 FORAMINAL STENOSIS OF LUMBAR REGION: ICD-10-CM

## 2023-02-16 DIAGNOSIS — M54.50 LUMBAR PAIN: Primary | ICD-10-CM

## 2023-02-16 DIAGNOSIS — M54.32 LEFT SIDED SCIATICA: ICD-10-CM

## 2023-02-16 DIAGNOSIS — M41.50 DEGENERATIVE SCOLIOSIS: ICD-10-CM

## 2023-02-16 DIAGNOSIS — M48.062 SPINAL STENOSIS OF LUMBAR REGION WITH NEUROGENIC CLAUDICATION: ICD-10-CM

## 2023-02-22 ENCOUNTER — HOSPITAL ENCOUNTER (OUTPATIENT)
Dept: MRI IMAGING | Age: 73
Discharge: HOME | End: 2023-02-22
Attending: ORTHOPAEDIC SURGERY
Payer: MEDICARE

## 2023-02-22 DIAGNOSIS — M41.50 DEGENERATIVE SCOLIOSIS: ICD-10-CM

## 2023-02-22 DIAGNOSIS — M54.50 LUMBAR PAIN: ICD-10-CM

## 2023-02-22 DIAGNOSIS — M51.36 DDD (DEGENERATIVE DISC DISEASE), LUMBAR: ICD-10-CM

## 2023-02-22 DIAGNOSIS — M47.816 LUMBAR SPONDYLOSIS: ICD-10-CM

## 2023-02-22 DIAGNOSIS — M48.061 FORAMINAL STENOSIS OF LUMBAR REGION: ICD-10-CM

## 2023-02-22 DIAGNOSIS — M54.16 RADICULOPATHY OF LUMBAR REGION: ICD-10-CM

## 2023-02-22 DIAGNOSIS — M48.062 SPINAL STENOSIS OF LUMBAR REGION WITH NEUROGENIC CLAUDICATION: ICD-10-CM

## 2023-02-22 DIAGNOSIS — M54.32 LEFT SIDED SCIATICA: ICD-10-CM

## 2023-02-22 PROCEDURE — 72148 MRI LUMBAR SPINE W/O DYE: CPT

## 2023-03-02 ENCOUNTER — TRANSCRIBE ORDER (OUTPATIENT)
Dept: SCHEDULING | Age: 73
End: 2023-03-02

## 2023-03-02 DIAGNOSIS — M54.32 LEFT SIDED SCIATICA: ICD-10-CM

## 2023-03-02 DIAGNOSIS — M54.50 LUMBAR PAIN: Primary | ICD-10-CM

## 2023-03-02 DIAGNOSIS — M41.50 DEGENERATIVE SCOLIOSIS: ICD-10-CM

## 2023-03-02 DIAGNOSIS — M48.062 SPINAL STENOSIS OF LUMBAR REGION WITH NEUROGENIC CLAUDICATION: ICD-10-CM

## 2023-03-02 DIAGNOSIS — M47.816 LUMBAR SPONDYLOSIS: ICD-10-CM

## 2023-03-02 DIAGNOSIS — M48.061 FORAMINAL STENOSIS OF LUMBAR REGION: ICD-10-CM

## 2023-03-02 DIAGNOSIS — M51.36 DDD (DEGENERATIVE DISC DISEASE), LUMBAR: ICD-10-CM

## 2023-03-02 DIAGNOSIS — M54.16 RADICULOPATHY, LUMBAR REGION: ICD-10-CM

## 2023-03-14 ENCOUNTER — HOSPITAL ENCOUNTER (OUTPATIENT)
Dept: CT IMAGING | Age: 73
Discharge: HOME | End: 2023-03-14
Attending: ORTHOPAEDIC SURGERY
Payer: MEDICARE

## 2023-03-14 DIAGNOSIS — M48.062 SPINAL STENOSIS OF LUMBAR REGION WITH NEUROGENIC CLAUDICATION: ICD-10-CM

## 2023-03-14 DIAGNOSIS — M48.061 FORAMINAL STENOSIS OF LUMBAR REGION: ICD-10-CM

## 2023-03-14 DIAGNOSIS — M54.50 LUMBAR PAIN: ICD-10-CM

## 2023-03-14 DIAGNOSIS — M54.32 LEFT SIDED SCIATICA: ICD-10-CM

## 2023-03-14 DIAGNOSIS — M54.16 RADICULOPATHY, LUMBAR REGION: ICD-10-CM

## 2023-03-14 DIAGNOSIS — M41.50 DEGENERATIVE SCOLIOSIS: ICD-10-CM

## 2023-03-14 DIAGNOSIS — M51.36 DDD (DEGENERATIVE DISC DISEASE), LUMBAR: ICD-10-CM

## 2023-03-14 DIAGNOSIS — M47.816 LUMBAR SPONDYLOSIS: ICD-10-CM

## 2023-03-14 PROCEDURE — 72131 CT LUMBAR SPINE W/O DYE: CPT

## 2023-03-21 ENCOUNTER — HOSPITAL ENCOUNTER (OUTPATIENT)
Dept: PREADMISSION TESTING | Age: 73
Discharge: HOME | End: 2023-03-21
Attending: ORTHOPAEDIC SURGERY
Payer: MEDICARE

## 2023-03-21 ENCOUNTER — HOSPITAL ENCOUNTER (OUTPATIENT)
Dept: GENERAL RADIOLOGY | Age: 73
Discharge: HOME | End: 2023-03-21
Attending: ORTHOPAEDIC SURGERY
Payer: MEDICARE

## 2023-03-21 VITALS
HEART RATE: 62 BPM | HEIGHT: 62 IN | OXYGEN SATURATION: 96 % | RESPIRATION RATE: 20 BRPM | SYSTOLIC BLOOD PRESSURE: 104 MMHG | DIASTOLIC BLOOD PRESSURE: 68 MMHG | TEMPERATURE: 97.6 F | WEIGHT: 192.24 LBS | BODY MASS INDEX: 35.38 KG/M2

## 2023-03-21 LAB
ABO + RH BLD: NORMAL
ALBUMIN SERPL-MCNC: 3.7 G/DL (ref 3.5–5)
ALBUMIN/GLOB SERPL: 1.1 (ref 1.1–2.2)
ALP SERPL-CCNC: 103 U/L (ref 45–117)
ALT SERPL-CCNC: 21 U/L (ref 12–78)
AMPHET UR QL SCN: NEGATIVE
ANION GAP SERPL CALC-SCNC: 3 MMOL/L (ref 5–15)
APPEARANCE UR: CLEAR
AST SERPL-CCNC: 13 U/L (ref 15–37)
BACTERIA URNS QL MICRO: NEGATIVE /HPF
BARBITURATES UR QL SCN: NEGATIVE
BENZODIAZ UR QL: NEGATIVE
BILIRUB SERPL-MCNC: 0.8 MG/DL (ref 0.2–1)
BILIRUB UR QL: NEGATIVE
BLOOD GROUP ANTIBODIES SERPL: NORMAL
BUN SERPL-MCNC: 19 MG/DL (ref 6–20)
BUN/CREAT SERPL: 21 (ref 12–20)
CALCIUM SERPL-MCNC: 9.6 MG/DL (ref 8.5–10.1)
CANNABINOIDS UR QL SCN: NEGATIVE
CHLORIDE SERPL-SCNC: 106 MMOL/L (ref 97–108)
CO2 SERPL-SCNC: 30 MMOL/L (ref 21–32)
COCAINE UR QL SCN: NEGATIVE
COLOR UR: ABNORMAL
CREAT SERPL-MCNC: 0.92 MG/DL (ref 0.55–1.02)
DRUG SCRN COMMENT,DRGCM: NORMAL
EPITH CASTS URNS QL MICRO: ABNORMAL /LPF
ERYTHROCYTE [DISTWIDTH] IN BLOOD BY AUTOMATED COUNT: 13.5 % (ref 11.5–14.5)
EST. AVERAGE GLUCOSE BLD GHB EST-MCNC: 117 MG/DL
GLOBULIN SER CALC-MCNC: 3.4 G/DL (ref 2–4)
GLUCOSE SERPL-MCNC: 73 MG/DL (ref 65–100)
GLUCOSE UR STRIP.AUTO-MCNC: NEGATIVE MG/DL
HBA1C MFR BLD: 5.7 % (ref 4–5.6)
HCT VFR BLD AUTO: 44.2 % (ref 35–47)
HGB BLD-MCNC: 14.2 G/DL (ref 11.5–16)
HGB UR QL STRIP: NEGATIVE
HYALINE CASTS URNS QL MICRO: ABNORMAL /LPF (ref 0–5)
INR PPP: 1 (ref 0.9–1.1)
KETONES UR QL STRIP.AUTO: NEGATIVE MG/DL
LEUKOCYTE ESTERASE UR QL STRIP.AUTO: ABNORMAL
MCH RBC QN AUTO: 29.5 PG (ref 26–34)
MCHC RBC AUTO-ENTMCNC: 32.1 G/DL (ref 30–36.5)
MCV RBC AUTO: 91.7 FL (ref 80–99)
METHADONE UR QL: NEGATIVE
NITRITE UR QL STRIP.AUTO: NEGATIVE
NRBC # BLD: 0 K/UL (ref 0–0.01)
NRBC BLD-RTO: 0 PER 100 WBC
OPIATES UR QL: NEGATIVE
PCP UR QL: NEGATIVE
PH UR STRIP: 5.5 (ref 5–8)
PLATELET # BLD AUTO: 239 K/UL (ref 150–400)
PMV BLD AUTO: 9.8 FL (ref 8.9–12.9)
POTASSIUM SERPL-SCNC: 4.1 MMOL/L (ref 3.5–5.1)
PREALB SERPL-MCNC: 21.6 MG/DL (ref 20–40)
PROT SERPL-MCNC: 7.1 G/DL (ref 6.4–8.2)
PROT UR STRIP-MCNC: NEGATIVE MG/DL
PROTHROMBIN TIME: 10.7 SEC (ref 9–11.1)
RBC # BLD AUTO: 4.82 M/UL (ref 3.8–5.2)
RBC #/AREA URNS HPF: ABNORMAL /HPF (ref 0–5)
SODIUM SERPL-SCNC: 139 MMOL/L (ref 136–145)
SP GR UR REFRACTOMETRY: 1.01
SPECIMEN EXP DATE BLD: NORMAL
UA: UC IF INDICATED,UAUC: ABNORMAL
UROBILINOGEN UR QL STRIP.AUTO: 1 EU/DL (ref 0.2–1)
WBC # BLD AUTO: 4.8 K/UL (ref 3.6–11)
WBC URNS QL MICRO: ABNORMAL /HPF (ref 0–4)

## 2023-03-21 PROCEDURE — 97116 GAIT TRAINING THERAPY: CPT

## 2023-03-21 PROCEDURE — 93005 ELECTROCARDIOGRAM TRACING: CPT

## 2023-03-21 PROCEDURE — 82306 VITAMIN D 25 HYDROXY: CPT

## 2023-03-21 PROCEDURE — 80053 COMPREHEN METABOLIC PANEL: CPT

## 2023-03-21 PROCEDURE — 83036 HEMOGLOBIN GLYCOSYLATED A1C: CPT

## 2023-03-21 PROCEDURE — 85610 PROTHROMBIN TIME: CPT

## 2023-03-21 PROCEDURE — 86900 BLOOD TYPING SEROLOGIC ABO: CPT

## 2023-03-21 PROCEDURE — 85027 COMPLETE CBC AUTOMATED: CPT

## 2023-03-21 PROCEDURE — 80307 DRUG TEST PRSMV CHEM ANLYZR: CPT

## 2023-03-21 PROCEDURE — 36415 COLL VENOUS BLD VENIPUNCTURE: CPT

## 2023-03-21 PROCEDURE — 97161 PT EVAL LOW COMPLEX 20 MIN: CPT

## 2023-03-21 PROCEDURE — 81001 URINALYSIS AUTO W/SCOPE: CPT

## 2023-03-21 PROCEDURE — 84134 ASSAY OF PREALBUMIN: CPT

## 2023-03-21 PROCEDURE — 87086 URINE CULTURE/COLONY COUNT: CPT

## 2023-03-21 PROCEDURE — 71046 X-RAY EXAM CHEST 2 VIEWS: CPT

## 2023-03-21 RX ORDER — PRAVASTATIN SODIUM 20 MG/1
20 TABLET ORAL
COMMUNITY

## 2023-03-21 NOTE — PERIOP NOTES
[facility]  \"We've Got Your Back! Caring For Yourself Before and After Spine Surgery\" handbook was provided & reviewed with the patient during the pre-admission testing (PAT) appointment. An opportunity for questions was provided, patient verbalized understanding.

## 2023-03-21 NOTE — PROGRESS NOTES
[facility]  Physical Therapy Pre-surgery evaluation  [address] [facility], [address]    PHYSICAL THERAPY PRE SPINE SURGERY EVALUATION  [address] (68 y.o. female)  Date: 3/21/2023    pat  Procedure(s) (LRB):  L5-S1 POSTERIOR DECOMPRESSION AND FUSION (CHOICE) (N/A)     Precautions:          ASSESSMENT :  Based on the objective data described below, the patient presents with impaired standing tolerance, gait distance tolerance, back pain, and LLE radicular pain due to end stage degenerative joint disease in the spinal level: lumbar spine. Discussed anticipated disposition to home with possible discharge within a 1 to 2 day time frame post-surgery. Patient and  in agreement. Anticipate patient will need acute PT and OT orders based on current and exprected deficits post surgery. GOALS: (Goals have been discussed and agreed upon with patient.)  DISCHARGE GOALS: Time Frame: 1 DAY  Patient will demonstrate increased strength, range of motion, and pain control via a home exercise program in order to minimize functional deficits in preparation for their upcoming surgery. This will be achieved by using education, demonstration and through the use of an informational handout including a home exercise program.  REHABILITATION POTENTIAL FOR STATED GOALS: Good     RECOMMENDATIONS AND PLANNED INTERVENTIONS: (Benefits and precautions of physical therapy have been discussed with the patient.)  Home Exercise Program  TREATMENT PLAN EFFECTIVE DATES: 3/21/2023 to 3/21/2023   FREQUENCY/DURATION: Patient to continue to perform home exercise program at least twice daily until surgery. SUBJECTIVE:   Patient stated my daughter is going to stay with me for 6 wks. 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    CAD (coronary artery disease)     MVP    Fibromyalgia     GERD (gastroesophageal reflux disease)     Hypertension     Menopause     Nausea & vomiting     Pancreatitis     Plantar fasciitis     Thyroid disease     TMJ (dislocation of temporomandibular joint)      Past Surgical History:   Procedure Laterality Date    COLONOSCOPY,DIAGNOSTIC  10/1/2015         HX APPENDECTOMY      HX CHOLECYSTECTOMY      HX GYN      d & c    HX ORTHOPAEDIC  2/16/15    LEFT TOTAL KNEE ARTHROPLASTY WITH NAVIGATION     HX TONSILLECTOMY      HX UROLOGICAL      suburethral sling    MO ARTHROSCOPY TEMPOROMANDIBULAR JOINT SURGICAL      MO ARTHRP KNE CONDYLE&PLATU MEDIAL&LAT COMPARTMENTS  2014/June    right       Prior Level of Function/Home Situation: I, driving, retired. Personal factors and/or comorbidities impacting plan of care: none      Home Situation  Home Environment: Private residence  # Steps to Enter: 4  Rails to Enter: Yes  Hand Rails : Bilateral (far apart)  One/Two Story Residence: One story  Living Alone: No  Support Systems: Other Family Member(s), Child(sheeba)  Patient Expects to be Discharged to[de-identified] Home  Current DME Used/Available at Home: Cane, straight, Raised toilet seat, Grab bars, Shower chair  Tub or Shower Type: Tub/Shower combination      EXAMINATION/PRESENTATION/DECISION MAKING:     ADLs (Current Functional Status):    Bathing/Showering:   [x] Independent  [] Requires Assistance from Someone  [] Sponge Bath Only   Ambulation:  [x] Independent  [] Walk Indoors Only  [x] Walk Outdoors  [] Use Assistive Device  [] Use Wheelchair Only     Dressing:  [x] Independent    Requires Assistance from Someone for:  [] Sock/Shoes  [] Pants  [] Everything   Household Activities:  [] Routine house and yard work  [x] [employer] and occasional yard work  [] None       Critical Behavior:                Strength:     Strength: Generally decreased, functional                       Tone & Sensation:   Tone: Normal              Sensation: Impaired (LLE numbness/tingling/pins and needls)                  Range Of Motion:     AROM: Within functional limits           Coordination:   Coordination: Generally decreased, functional    Functional Mobility:  Transfers:  Sit to Stand: Modified independent  Stand to Sit: Modified independent            Balance:   Sitting: Intact  Standing: Intact  Ambulation/Gait Training:  Distance (ft): 100 Feet (ft)  Assistive Device:  (none)        Gait Description (WDL): Exceptions to WDL  Gait Abnormalities: Decreased step clearance              Speed/Елена: Pace decreased (<100 feet/min)          Functional Measure:  Timed up and go:    Timed Get Up And Go Test: 8.12       < than 10 seconds=Normal  Greater then 13.5 seconds (in elderly)=Increased fall risk   Jeanne Anderson [place] M. Predicting the probability for falls in community dwelling older adults using the Timed Up and Go Test. Phys Ther. 2000;80:896-903. Pain:  8-9/10         Radicular pain:   LLE    Activity Tolerance:   Good   Patient []   does  [x]   does not demonstrate signs/symptoms of shortness of breath/dyspnea on exertion/respiratory distress. COMMUNICATION/EDUCATION:   The patient was educated on:  [x]         Early post operative mobility is imperative to achieve a patient's desired outcomes and to restore biological function. [x]         Post operative spinal precautions may/may not be applicable. These precautions are based on the patient's physician and the procedure(s) performed.     [x]         Spinal precautions including:    No bending forward, sideways, or backwards    No twisting     No lifting more than 5-10 pounds    No sitting longer than 30-60 minutes at a time    Josesito brace when out of bed and mobilizing    The patients plan of care was discussed as follows:   [x]         The patient verbalized understanding of his/her plan in preparation for their upcoming surgery  []         The patient's  was present for this session  []        The patient reports that he/she does not have a  identified at this time  []         The  verbalized understanding of the education regarding the patient's upcoming surgery  [x]         Patient/family agree to work toward stated goals and plan of care. []         Patient understands intent and goals of therapy, but is neutral about his/her participation. []         Patient is unable to participate in goal setting and plan of care.       Thank you for this referral.  Jaime Olivas, PT

## 2023-03-21 NOTE — PERIOP NOTES
Hibiclens/Chlorhexidine    Preventing Infections Before and After - Your Surgery    IMPORTANT INSTRUCTIONS    Please read and follow these instructions carefully. If you are unable to comply with the below instructions your procedure will be cancelled. Every Night for Three (3) nights before your surgery:  Shower with an antibacterial soap, such as Dial, or the soap provided at your preassessment appointment. A shower is better than a bath for cleaning your skin. If needed, ask someone to help you reach all areas of your body. Dont forget to clean your belly button with every shower. The night before your surgery: If you lose your Hibiclens/chlorhexidine please contact surgery center or you can purchase it at a local pharmacy  On the night before your surgery, shower with an antibacterial soap, such as Dial, or the soap provided at your preassessment appointment. With one packet of Hibiclens/Chlorhexidine in hand, turn water off. Apply Hibiclens antiseptic skin cleanser with a clean, freshly washed washcloth. Gently apply to your body from chin to toes (except the genital area) and especially the area(s) where your incision(s) will be. Leave Hibiclens/Chlorhexidine on your skin for at least 20 seconds. CAUTION: If needed, Hibiclens/chlorhexidine may be used to clean the folds of skin of the legs (such as in the area of the groin) and on your buttocks and hips. However, do not use Hibiclens/Chlorhexidine above the neck or in the genital area (your bottom) or put inside any area of your body. Turn the water back on and rinse. Dry gently with a clean, freshly washed towel. After your shower, do not use any powder, deodorant, perfumes or lotion. Use clean, freshly washed towels and washcloths every time you shower. Wear clean, freshly washed pajamas to bed the night before surgery. Sleep on clean, freshly washed sheets. Do not allow pets to sleep in your bed with you.         The Morning of your surgery:  Shower again thoroughly with an antibacterial soap, such as Dial or the soap provided at your preassessment appointment. If needed, ask someone for help to reach all areas of your body. Dont forget to clean your belly button! Rinse. Dry gently with a clean, freshly washed towel. After your shower, do not use any powder, deodorant, perfumes or lotion prior to surgery. Put on clean, freshly washed clothing. Tips to help prevent infections after your surgery:  Protect your surgical wound from germs:  Hand washing is the most important thing you and your caregivers can do to prevent infections. Keep your bandage clean and dry! Do not touch your surgical wound. Use clean, freshly washed towels and washcloths every time you shower; do not share bath linens with others. Until your surgical wound is healed, wear clothing and sleep on bed linens each day that are clean and freshly washed. Do not allow pets to sleep in your bed with you or touch your surgical wound. Do not smoke - smoking delays wound healing. This may be a good time to stop smoking. If you have diabetes, it is important for you to manage your blood sugar levels properly before your surgery as well as after your surgery. Poorly managed blood sugar levels slow down wound healing and prevent you from healing completely. If you lose your Hibiclens/chlorhexidine, please call the [facility], or it is available for purchase at your pharmacy.                ___________________      ___________________      ________________  (Signature of Patient)          (Witness)                   (Date and Time)

## 2023-03-21 NOTE — PERIOP NOTES
[facility]  Joint/Spine Preoperative Instructions        Surgery Date 03/28/23          Time of Arrival to be called @ [phone]    1. On the day of your surgery, please report to the Surgical Services Registration Desk and sign in at your designated time. The Surgery Center is located to the right of the Emergency Room. 2. You must have someone with you to drive you home. You should not drive a car for 24 hours following surgery. Please make arrangements for a friend or family member to stay with you for the first 24 hours after your surgery. 3. No food after midnight. Medications morning of surgery should be taken with a sip of water. Please follow pre-surgery drink instructions that were given at your Pre Admission Testing appointment. 4. We recommend you do not drink any alcoholic beverages for 24 hours before and after your surgery. 5. Contact your surgeons office for instructions on the following medications: non-steroidal anti-inflammatory drugs (i.e. Advil, Aleve), vitamins, and supplements. (Some surgeons will want you to stop these medications prior to surgery and others may allow you to take them)  **If you are currently taking Plavix, Coumadin, Aspirin and/or other blood-thinning agents, contact your surgeon for instructions. ** Your surgeon will partner with the physician prescribing these medications to determine if it is safe to stop or if you need to continue taking. Please do not stop taking these medications without instructions from your surgeon    6. Wear comfortable clothes. Wear glasses instead of contacts. Do not bring any money or jewelry. Please bring picture ID, insurance card, and any prearranged co-payment or hospital payment. Do not wear make-up, particularly mascara the morning of your surgery. Do not wear nail polish, particularly if you are having foot /hand surgery.   Wear your hair loose or down, no ponytails, buns, lucho pins or clips. All body piercings must be removed. Please shower with antibacterial soap for three consecutive days before and on the morning of surgery, but do not apply any lotions, powders or deodorants after the shower on the day of surgery. Please use a fresh towels after each shower. Please sleep in clean clothes and change bed linens the night before surgery. Please do not shave for 48 hours prior to surgery. Shaving of the face is acceptable. 7. You should understand that if you do not follow these instructions your surgery may be cancelled. If your physical condition changes (I.e. fever, cold or flu) please contact your surgeon as soon as possible. 8. It is important that you be on time. If a situation occurs where you may be late, please call [phone] (OR Holding Area). 9. If you have any questions and or problems, please call [phone] (Pre-admission Testing). 10. Your surgery time may be subject to change. You will receive a phone call the evening prior with your time of arrival.    11.  If having outpatient surgery, you must have someone to drive you here, stay with you during the duration of your stay, and to drive you home at time of discharge. 12. The following link is for the educational video for patients and/or families. http://burr-castro.org/. com/locations/eypgslcas-ormjnef-owrbmnt/[place]/[facility]/educational-materials    Special Instructions: Hold all vitamins, supplements, aspirin and NSAIDs (meloxicam) 5 days prior to surgery       TAKE ALL MEDICATIONS THE DAY OF SURGERY       I understand a pre-operative phone call will be made to verify my surgery time. In the event that I am not available, I give permission for a message to be left on my answering service and/or with another person?   yes         ___________________      __________   _________    (Signature of Patient)             (Witness)                (Date and Time)

## 2023-03-21 NOTE — PERIOP NOTES

## 2023-03-21 NOTE — PERIOP NOTES

## 2023-03-22 LAB
25(OH)D3 SERPL-MCNC: 29 NG/ML (ref 30–100)
ATRIAL RATE: 67 BPM
BACTERIA SPEC CULT: NORMAL
CALCULATED P AXIS, ECG09: 71 DEGREES
CALCULATED T AXIS, ECG11: 11 DEGREES
DIAGNOSIS, 93000: NORMAL
P-R INTERVAL, ECG05: 178 MS
Q-T INTERVAL, ECG07: 380 MS
QRS DURATION, ECG06: 64 MS
QTC CALCULATION (BEZET), ECG08: 401 MS
SERVICE CMNT-IMP: NORMAL
SERVICE CMNT-IMP: NORMAL
VENTRICULAR RATE, ECG03: 67 BPM

## 2023-03-22 NOTE — ADVANCED PRACTICE NURSE
PAT Nurse Practitioner   Pre-Operative Chart Review/Assessment:-ORTHOPEDIC/NEUROSURGICAL SPINE                Patient Name:  Myrene Angelucci                                                           Age:   68 y.o.    :  1950     Today's Date:  3/22/2023     Date of PAT:   3/21/23      Date of Surgery:    3/28/2023      Procedure(s):     L5-S1 posterior decompression and fusion      Surgeon:   Portia Woodard     Primary Care Provider:    Dr. Radha Estevez:      1)  Cardiac Clearance:  Not requested        2)  Sleep apnea screening:  Prior dx of PHILIP. PHILIP 3 in PAT w/ No reports of loud snoring or witnessed apnea while sleeping       3)   Program for Diabetes Health Consult:  Not indicated-A1C 5.7       4) Treatment for MRSA/MSSA:  Negative       5) Additional Concerns:  BMI 35, MVP, slow to wake after anesthesia, fibromyalgia, lichen planus, hx of pancreatitis, PONV, concern for PHILIP.                  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    CAD (coronary artery disease)     MVP    Fibromyalgia     GERD (gastroesophageal reflux disease)     Hypertension     Menopause     Nausea & vomiting     Pancreatitis     Plantar fasciitis     Thyroid disease     TMJ (dislocation of temporomandibular joint)       ____________________________________________  PAST SURGICAL HISTORY  Past Surgical History:   Procedure Laterality Date    COLONOSCOPY,DIAGNOSTIC  10/1/2015         HX APPENDECTOMY      HX CHOLECYSTECTOMY      HX GYN      d & c    HX ORTHOPAEDIC  2/16/15    LEFT TOTAL KNEE ARTHROPLASTY WITH NAVIGATION     HX TONSILLECTOMY      HX UROLOGICAL      suburethral sling    TN ARTHROSCOPY TEMPOROMANDIBULAR JOINT SURGICAL      TN ARTHRP KNE CONDYLE&PLATU MEDIAL&LAT COMPARTMENTS  2014/June    right      ____________________________________________  HOME MEDICATIONS    Current Outpatient Medications   Medication Sig    pravastatin (PRAVACHOL) 20 mg tablet Take 20 mg by mouth nightly. carvediloL (COREG) 12.5 mg tablet Take 12.5 mg by mouth two (2) times daily (with meals). aspirin 81 mg chewable tablet Take 81 mg by mouth daily. ferrous sulfate (IRON PO) Take 65 mg by mouth daily. meloxicam (MOBIC) 15 mg tablet Take 15 mg by mouth daily. amLODIPine (NORVASC) 10 mg tablet Take 1 Tab by mouth every morning. DO NOT TAKE FOR BLOOD PRESSURE LESS THAN 130/80    losartan (COZAAR) 100 mg tablet Take 1 Tab by mouth every morning. DO NOT TAKE FOR BLOOD PRESSURE LESS THAN 130/80    melatonin 10 mg tab Take  by mouth nightly. multivitamin (ONE A DAY) tablet Take 0.5 tablets by mouth two (2) times a day. furosemide (LASIX) 40 mg tablet Take 40 mg by mouth every morning. venlafaxine-SR (EFFEXOR-XR) 150 mg capsule Take 150 mg by mouth two (2) times a day. 150 in the morning, 75 at night time    gabapentin (NEURONTIN) 600 mg tablet Take 600 mg by mouth three (3) times daily. Indications: 1200 mg in AM; 1800 mg in PM    levothyroxine (SYNTHROID) 75 mcg tablet Take 75 mcg by mouth Daily (before breakfast). potassium chloride SA (MICRO-K) 10 mEq capsule Take 10 mEq by mouth daily (with dinner).      No current facility-administered medications for this encounter.      ____________________________________________  ALLERGIES  Allergies   Allergen Reactions    Codeine Other (comments)     Makes her foggy in the head    Erythromycin Nausea and Vomiting    Sulfa (Sulfonamide Antibiotics) Rash ____________________________________________  SOCIAL HISTORY  Social History     Tobacco Use    Smoking status: Never    Smokeless tobacco: Never   Substance Use Topics    Alcohol use: Yes     Alcohol/week: 2.5 standard drinks     Types: 3 Glasses of wine per week     Comment: 3 glasses week      ____________________________________________  COVID VACCINATION STATUS:      Internal Administration   First Dose      Second Dose         Last COVID Lab SARS-CoV-2, SERGEY (no units)   Date Value   10/28/2020 Not Detected                      Labs:     Hospital Outpatient Visit on 03/21/2023   Component Date Value Ref Range Status    Sodium 03/21/2023 139  136 - 145 mmol/L Final    Potassium 03/21/2023 4.1  3.5 - 5.1 mmol/L Final    Chloride 03/21/2023 106  97 - 108 mmol/L Final    CO2 03/21/2023 30  21 - 32 mmol/L Final    Anion gap 03/21/2023 3 (A)  5 - 15 mmol/L Final    Glucose 03/21/2023 73  65 - 100 mg/dL Final    BUN 03/21/2023 19  6 - 20 MG/DL Final    Creatinine 03/21/2023 0.92  0.55 - 1.02 MG/DL Final    BUN/Creatinine ratio 03/21/2023 21 (A)  12 - 20   Final    eGFR 03/21/2023 >60  >60 ml/min/1.73m2 Final    Comment:      Pediatric calculator link: VijiPrasad.at. org/professionals/kdoqi/gfr_calculatorped       These results are not intended for use in patients <25years of age. eGFR results are calculated without a race factor using  the 2021 CKD-EPI equation. Careful clinical correlation is recommended, particularly when comparing to results calculated using previous equations. The CKD-EPI equation is less accurate in patients with extremes of muscle mass, extra-renal metabolism of creatinine, excessive creatine ingestion, or following therapy that affects renal tubular secretion. Calcium 03/21/2023 9.6  8.5 - 10.1 MG/DL Final    Bilirubin, total 03/21/2023 0.8  0.2 - 1.0 MG/DL Final    ALT (SGPT) 03/21/2023 21  12 - 78 U/L Final    AST (SGOT) 03/21/2023 13 (A)  15 - 37 U/L Final    Alk. phosphatase 03/21/2023 103  45 - 117 U/L Final    Protein, total 03/21/2023 7.1  6.4 - 8.2 g/dL Final    Albumin 03/21/2023 3.7  3.5 - 5.0 g/dL Final    Globulin 03/21/2023 3.4  2.0 - 4.0 g/dL Final    A-G Ratio 03/21/2023 1.1  1.1 - 2.2   Final    Prealbumin 03/21/2023 21.6  20.0 - 40.0 mg/dL Final    AMPHETAMINES 03/21/2023 Negative  NEG   Final    BARBITURATES 03/21/2023 Negative  NEG   Final    BENZODIAZEPINES 03/21/2023 Negative  NEG   Final    COCAINE 03/21/2023 Negative  NEG   Final    METHADONE 03/21/2023 Negative  NEG   Final    OPIATES 03/21/2023 Negative  NEG   Final    PCP(PHENCYCLIDINE) 03/21/2023 Negative  NEG   Final    THC (TH-CANNABINOL) 03/21/2023 Negative  NEG   Final    Drug screen comment 03/21/2023 (NOTE)   Final    Comment: This test is a screen for drugs of abuse in a medical setting only   (i.e., they are unconfirmed results and as such must not be used for   non-medical purposes e.g., employment testing, legal testing). Due to   its inherent nature, false positive (FP) and false negative (FN)   results may be obtained. Therefore, if necessary for medical care,   recommend confirmation of positive findings by GC/MS. The cutoff   values (i.e., the level at which this screening test becomes positive   for a given drug group) are:    Amphetamine/Methamphetamine: 300 ng/mL  Barbiturates:                200 ng/mL  Benzodiazepines:             200 ng/mL  Cocaine:                     150 ng/mL  Methadone:                   300 ng/mL  Opiates:                     300 ng/mL   Phencyclidine, PCP:           25 ng/mL  Marijuana, THC:               50 ng/mL    This screening test can identify the presence of the following drugs   when above the cutoff value; see list posted on the intranet. It can   be viewed by mere                           ecting in sequence the following from the [address] home   page: Johnathan; [address], Resources;  [facility], Physician Resources Q to Z; \"UDS (Urine Drug Screen   Automated) List of Detectable Drugs. \"     Or use web address:   http://[email or web address]/[place]/[facility]/Physician%20Resources/  UDS%20List%20of%20Detectable%20Drugs. pdf      Vitamin D 25-Hydroxy 03/21/2023 29.0 (A)  30 - 100 ng/mL Final    Comment: (NOTE)  Deficiency               <20 ng/mL  Insufficiency          20-30 ng/mL  Sufficient             ng/mL  Possible toxicity       >100 ng/mL    The Method used is Siemens Advia Centaur currently standardized to a   Center of Disease Control and Prevention (CDC) certified reference   [facility]. Samples containing fluorescein dye can produce falsely   elevated values when tested with the ADVIA Centaur Vitamin D Assay. It is recommended that results in the toxic range, >100 ng/mL, be   retested 72 hours post fluorescein exposure. WBC 03/21/2023 4.8  3.6 - 11.0 K/uL Final    RBC 03/21/2023 4.82  3.80 - 5.20 M/uL Final    HGB 03/21/2023 14.2  11.5 - 16.0 g/dL Final    HCT 03/21/2023 44.2  35.0 - 47.0 % Final    MCV 03/21/2023 91.7  80.0 - 99.0 FL Final    MCH 03/21/2023 29.5  26.0 - 34.0 PG Final    MCHC 03/21/2023 32.1  30.0 - 36.5 g/dL Final    RDW 03/21/2023 13.5  11.5 - 14.5 % Final    PLATELET 21/03/5368 368  150 - 400 K/uL Final    MPV 03/21/2023 9.8  8.9 - 12.9 FL Final    NRBC 03/21/2023 0.0  0  WBC Final    ABSOLUTE NRBC 03/21/2023 0.00  0.00 - 0.01 K/uL Final    INR 03/21/2023 1.0  0.9 - 1.1   Final    A single therapeutic range for Vit K antagonists may not be optimal for all indications - see June, 2008 issue of Chest, American College of Chest Physicians Evidence-Based Clinical Practice Guidelines, 8th Edition.     Prothrombin time 03/21/2023 10.7  9.0 - 11.1 sec Final    Color 03/21/2023 YELLOW/STRAW    Final    Color Reference Range: Straw, Yellow or Dark Yellow    Appearance 03/21/2023 CLEAR  CLEAR   Final    Specific gravity 03/21/2023 1.015    Final    pH (UA) 03/21/2023 5.5  5.0 - 8.0   Final    Protein 03/21/2023 Negative  NEG mg/dL Final    Glucose 03/21/2023 Negative  NEG mg/dL Final    Ketone 03/21/2023 Negative  NEG mg/dL Final    Bilirubin 03/21/2023 Negative  NEG   Final    Blood 03/21/2023 Negative  NEG   Final    Urobilinogen 03/21/2023 1.0  0.2 - 1.0 EU/dL Final    Nitrites 03/21/2023 Negative  NEG   Final    Leukocyte Esterase 03/21/2023 LARGE (A)  NEG   Final    WBC 03/21/2023 20-50  0 - 4 /hpf Final    RBC 03/21/2023 5-10  0 - 5 /hpf Final    Epithelial cells 03/21/2023 MANY (A)  FEW /lpf Final    Epithelial cell category consists of squamous cells and /or transitional urothelial cells. Renal tubular cells, if present, are separately identified as such.     Bacteria 03/21/2023 Negative  NEG /hpf Final    UA:UC IF INDICATED 03/21/2023 URINE CULTURE ORDERED (A)  CNI   Final    Hyaline cast 03/21/2023 5-10  0 - 5 /lpf Final    Ventricular Rate 03/21/2023 67  BPM Final    Atrial Rate 03/21/2023 67  BPM Final    P-R Interval 03/21/2023 178  ms Final    QRS Duration 03/21/2023 64  ms Final    Q-T Interval 03/21/2023 380  ms Final    QTC Calculation (Bezet) 03/21/2023 401  ms Final    Calculated P Axis 03/21/2023 71  degrees Final    Calculated T Axis 03/21/2023 11  degrees Final    Diagnosis 03/21/2023    Final                    Value:Sinus rhythm with occasional premature ventricular complexes  When compared with ECG of 10-TERESA-2014 08:30,  premature ventricular complexes are now present    Confirmed by Aiyana Cerda ([id number]) on 3/22/2023 10:28:57 AM      Hemoglobin A1c 03/21/2023 5.7 (A)  4.0 - 5.6 % Final    Comment: NEW METHOD  PLEASE NOTE NEW REFERENCE RANGE  (NOTE)  HbA1C Interpretive Ranges  <5.7              Normal  5.7 - 6.4         Consider Prediabetes  >6.5              Consider Diabetes      Est. average glucose 03/21/2023 117  mg/dL Final    Crossmatch Expiration 03/21/2023 03/31/2023,2359   Final    ABO/Rh(D) 03/21/2023 A POSITIVE   Final    Antibody screen 03/21/2023 NEG   Final CULTURE, URINE [[id number]] (Order [id number])  Microbiology  Date: 3/21/2023 Department: [facility] Preadmit Tsting Released By:  (auto-released) Authorizing: Larissa Daniel MD     CULTURE, URINE  Order: [id number]  Collected 3/21/2023 13:11    Status: Final result    Specimen Information: Urine   0 Result Notes        Component Ref Range & Units 3/21/23 1311 Resulting Agency   Special Requests:   NO SPECIAL REQUESTS   Reflexed from [id number]  [facility]   Culture result:   No growth (<1,000 CFU/ML)  [address]              Specimen Collected: 03/21/23 13:11 Last Resulted: 03/22/23 20:08       Order Details     Lab and Collection Details     Routing     Result History     View Encounter Conversation           Result Care Coordination      Patient Communication     Released  Not seen Back to Top           Collection Information    Specimen ID: [id number]    Urine       Collected: 3/21/2023  1:11 PM    [id number]    Resulting Agency: [facility]. [facility],    [address] [place] [id number]        Lab Information    Lab   [facility],    [address] [place] [id number]                Lab Inquiry View [address] [place] [address] Lab Information       CULTURE, URINE: Patient Communication     Released  Not seen     Provider Information    Ordering User Ordering Provider Authorizing Provider   Darnell, Lab In MD Larissa Mills MD   Attending Provider(s) PCP   MD Victor Hugo Chung MD     How to build your own [facility], URINE (Order #[id number]) on 3/21/23     Tracking Links    Cosign Tracking Order Transmittal Tracking     XR Results (most recent):    Results from [place] encounter on 03/21/23    XR CHEST PA LAT    Narrative  EXAM: XR CHEST PA LAT    INDICATION: Chest pain    COMPARISON: [place] 10, 2014    TECHNIQUE: PA and lateral chest views    FINDINGS: The cardiac size is within normal limits. The pulmonary vasculature is  within normal limits. The lungs and pleural spaces are clear. Hiatal hernia. Impression  IMPRESSION:    No change no acute findings. Skin:   Denies open wounds, cuts, sores, rashes or other areas of concern in PAT assessment.         Leticia Lorenzo, ARLEY

## 2023-03-23 RX ORDER — ACETAMINOPHEN 500 MG
2000 TABLET ORAL DAILY
COMMUNITY

## 2023-03-23 NOTE — PERIOP NOTES
Notified pt her vitamin D level was slightly low and to begin a vitamin D supplement 2000 units daily now, per . She can follow up with her PCP after her surgery to recheck her level in a few months if she would like.    Pt states she will go get that today

## 2023-03-27 ENCOUNTER — ANESTHESIA EVENT (OUTPATIENT)
Dept: SURGERY | Age: 73
DRG: 458 | End: 2023-03-27
Payer: MEDICARE

## 2023-03-27 NOTE — ANESTHESIA PREPROCEDURE EVALUATION
Anesthetic History     PONV          Review of Systems / Medical History  Patient summary reviewed, nursing notes reviewed and pertinent labs reviewed    Pulmonary  Within defined limits                 Neuro/Psych   Within defined limits           Cardiovascular    Hypertension  Valvular problems/murmurs (MVP): mitral insufficiency      Dysrhythmias   CAD    Exercise tolerance: >4 METS     GI/Hepatic/Renal     GERD           Endo/Other      Hypothyroidism  Obesity and arthritis     Other Findings   Comments: TMJ  fibromyalgia           Physical Exam    Airway  Mallampati: II  TM Distance: 4 - 6 cm  Neck ROM: normal range of motion   Mouth opening: Normal     Cardiovascular          Murmur: Grade 2, Mitral area     Dental  No notable dental hx       Pulmonary  Breath sounds clear to auscultation               Abdominal  GI exam deferred       Other Findings            Anesthetic Plan    ASA: 3  Anesthesia type: general          Induction: Intravenous  Anesthetic plan and risks discussed with: Patient      PONV.

## 2023-03-28 ENCOUNTER — APPOINTMENT (OUTPATIENT)
Dept: GENERAL RADIOLOGY | Age: 73
DRG: 458 | End: 2023-03-28
Attending: ORTHOPAEDIC SURGERY
Payer: MEDICARE

## 2023-03-28 ENCOUNTER — ANESTHESIA (OUTPATIENT)
Dept: SURGERY | Age: 73
DRG: 458 | End: 2023-03-28
Payer: MEDICARE

## 2023-03-28 ENCOUNTER — HOSPITAL ENCOUNTER (INPATIENT)
Age: 73
LOS: 2 days | Discharge: HOME | DRG: 458 | End: 2023-03-30
Attending: ORTHOPAEDIC SURGERY | Admitting: ORTHOPAEDIC SURGERY
Payer: MEDICARE

## 2023-03-28 DIAGNOSIS — Z98.1 S/P LUMBAR SPINAL FUSION: Primary | ICD-10-CM

## 2023-03-28 PROBLEM — M48.00 SPINAL STENOSIS: Status: ACTIVE | Noted: 2023-03-28

## 2023-03-28 PROCEDURE — 77030005513 HC CATH URETH FOL11 MDII -B: Performed by: ORTHOPAEDIC SURGERY

## 2023-03-28 PROCEDURE — 2709999900 HC NON-CHARGEABLE SUPPLY

## 2023-03-28 PROCEDURE — 74011000250 HC RX REV CODE- 250: Performed by: NURSE ANESTHETIST, CERTIFIED REGISTERED

## 2023-03-28 PROCEDURE — C1713 ANCHOR/SCREW BN/BN,TIS/BN: HCPCS | Performed by: ORTHOPAEDIC SURGERY

## 2023-03-28 PROCEDURE — 97530 THERAPEUTIC ACTIVITIES: CPT

## 2023-03-28 PROCEDURE — 74011000250 HC RX REV CODE- 250: Performed by: ORTHOPAEDIC SURGERY

## 2023-03-28 PROCEDURE — 77030003445 HC NDL BIOP BN BD -B: Performed by: ORTHOPAEDIC SURGERY

## 2023-03-28 PROCEDURE — 77030040179 HC DEV DRSG WND PICO S&N -C: Performed by: ORTHOPAEDIC SURGERY

## 2023-03-28 PROCEDURE — 76210000001 HC OR PH I REC 2.5 TO 3 HR: Performed by: ORTHOPAEDIC SURGERY

## 2023-03-28 PROCEDURE — 97116 GAIT TRAINING THERAPY: CPT

## 2023-03-28 PROCEDURE — 77030014647 HC SEAL FBRN TISSL BAXT -D: Performed by: ORTHOPAEDIC SURGERY

## 2023-03-28 PROCEDURE — 0SG3071 FUSION OF LUMBOSACRAL JOINT WITH AUTOLOGOUS TISSUE SUBSTITUTE, POSTERIOR APPROACH, POSTERIOR COLUMN, OPEN APPROACH: ICD-10-PCS | Performed by: ORTHOPAEDIC SURGERY

## 2023-03-28 PROCEDURE — 77030026438 HC STYL ET INTUB CARD -A: Performed by: NURSE ANESTHETIST, CERTIFIED REGISTERED

## 2023-03-28 PROCEDURE — 77030003666 HC NDL SPINAL BD -A: Performed by: ORTHOPAEDIC SURGERY

## 2023-03-28 PROCEDURE — 74011250636 HC RX REV CODE- 250/636: Performed by: ORTHOPAEDIC SURGERY

## 2023-03-28 PROCEDURE — 76060000035 HC ANESTHESIA 2 TO 2.5 HR: Performed by: ORTHOPAEDIC SURGERY

## 2023-03-28 PROCEDURE — 74011250636 HC RX REV CODE- 250/636: Performed by: ANESTHESIOLOGY

## 2023-03-28 PROCEDURE — 77030013079 HC BLNKT BAIR HGGR 3M -A: Performed by: NURSE ANESTHETIST, CERTIFIED REGISTERED

## 2023-03-28 PROCEDURE — P9045 ALBUMIN (HUMAN), 5%, 250 ML: HCPCS | Performed by: ANESTHESIOLOGY

## 2023-03-28 PROCEDURE — 2709999900 HC NON-CHARGEABLE SUPPLY: Performed by: ORTHOPAEDIC SURGERY

## 2023-03-28 PROCEDURE — 77030008684 HC TU ET CUF COVD -B: Performed by: NURSE ANESTHETIST, CERTIFIED REGISTERED

## 2023-03-28 PROCEDURE — 8E0W0CZ ROBOTIC ASSISTED PROCEDURE OF TRUNK REGION, OPEN APPROACH: ICD-10-PCS | Performed by: ORTHOPAEDIC SURGERY

## 2023-03-28 PROCEDURE — 77030035236 HC SUT PDS STRATFX BARB J&J -B: Performed by: ORTHOPAEDIC SURGERY

## 2023-03-28 PROCEDURE — 74011250636 HC RX REV CODE- 250/636: Performed by: NURSE ANESTHETIST, CERTIFIED REGISTERED

## 2023-03-28 PROCEDURE — 77030003028 HC SUT VCRL J&J -A: Performed by: ORTHOPAEDIC SURGERY

## 2023-03-28 PROCEDURE — 77030034479 HC ADH SKN CLSR PRINEO J&J -B: Performed by: ORTHOPAEDIC SURGERY

## 2023-03-28 PROCEDURE — 65270000029 HC RM PRIVATE

## 2023-03-28 PROCEDURE — 77030014007 HC SPNG HEMSTAT J&J -B: Performed by: ORTHOPAEDIC SURGERY

## 2023-03-28 PROCEDURE — 76000 FLUOROSCOPY <1 HR PHYS/QHP: CPT

## 2023-03-28 PROCEDURE — 72100 X-RAY EXAM L-S SPINE 2/3 VWS: CPT

## 2023-03-28 PROCEDURE — 77030018723 HC ELCTRD BLD COVD -A: Performed by: ORTHOPAEDIC SURGERY

## 2023-03-28 PROCEDURE — 74011250637 HC RX REV CODE- 250/637: Performed by: ORTHOPAEDIC SURGERY

## 2023-03-28 PROCEDURE — 07DR3ZZ EXTRACTION OF ILIAC BONE MARROW, PERCUTANEOUS APPROACH: ICD-10-PCS | Performed by: ORTHOPAEDIC SURGERY

## 2023-03-28 PROCEDURE — 77030008462 HC STPLR SKN PROX J&J -A: Performed by: ORTHOPAEDIC SURGERY

## 2023-03-28 PROCEDURE — 76010000171 HC OR TIME 2 TO 2.5 HR INTENSV-TIER 1: Performed by: ORTHOPAEDIC SURGERY

## 2023-03-28 PROCEDURE — 77030004402 HC BUR NEUR STRY -C: Performed by: ORTHOPAEDIC SURGERY

## 2023-03-28 PROCEDURE — 74011000250 HC RX REV CODE- 250: Performed by: ANESTHESIOLOGY

## 2023-03-28 PROCEDURE — 77030019908 HC STETH ESOPH SIMS -A: Performed by: NURSE ANESTHETIST, CERTIFIED REGISTERED

## 2023-03-28 PROCEDURE — 97161 PT EVAL LOW COMPLEX 20 MIN: CPT

## 2023-03-28 PROCEDURE — 77030038692 HC WND DEB SYS IRMX -B: Performed by: ORTHOPAEDIC SURGERY

## 2023-03-28 PROCEDURE — 74011000250 HC RX REV CODE- 250

## 2023-03-28 PROCEDURE — 77030034475 HC MISC IMPL SPN: Performed by: ORTHOPAEDIC SURGERY

## 2023-03-28 DEVICE — SET SCREW
Type: IMPLANTABLE DEVICE | Site: SPINE LUMBAR | Status: FUNCTIONAL
Brand: MARINER

## 2023-03-28 DEVICE — ALLOGRAFT BNE SM 5 CC DBM FIBER OSTEOSTRAND PLUS: Type: IMPLANTABLE DEVICE | Site: SPINE LUMBAR | Status: FUNCTIONAL

## 2023-03-28 DEVICE — MIS PRECONTOURED ROD, 5.5 X 45MM
Type: IMPLANTABLE DEVICE | Site: SPINE LUMBAR | Status: FUNCTIONAL
Brand: MARINER MIS

## 2023-03-28 DEVICE — MIS PRECONTOURED ROD, 5.5 X 40MM
Type: IMPLANTABLE DEVICE | Site: SPINE LUMBAR | Status: FUNCTIONAL
Brand: MARINER MIS

## 2023-03-28 DEVICE — CANNULATED SCREW 6.50 X 45MM
Type: IMPLANTABLE DEVICE | Site: SPINE LUMBAR | Status: FUNCTIONAL
Brand: MARINER

## 2023-03-28 RX ORDER — PROCHLORPERAZINE EDISYLATE 5 MG/ML
5 INJECTION INTRAMUSCULAR; INTRAVENOUS
Status: ACTIVE | OUTPATIENT
Start: 2023-03-28 | End: 2023-03-29

## 2023-03-28 RX ORDER — GLYCOPYRROLATE 0.2 MG/ML
INJECTION INTRAMUSCULAR; INTRAVENOUS AS NEEDED
Status: DISCONTINUED | OUTPATIENT
Start: 2023-03-28 | End: 2023-03-28 | Stop reason: HOSPADM

## 2023-03-28 RX ORDER — SODIUM CHLORIDE, SODIUM LACTATE, POTASSIUM CHLORIDE, CALCIUM CHLORIDE 600; 310; 30; 20 MG/100ML; MG/100ML; MG/100ML; MG/100ML
25 INJECTION, SOLUTION INTRAVENOUS CONTINUOUS
Status: DISCONTINUED | OUTPATIENT
Start: 2023-03-28 | End: 2023-03-28 | Stop reason: HOSPADM

## 2023-03-28 RX ORDER — PHENYLEPHRINE HCL IN 0.9% NACL 0.4MG/10ML
SYRINGE (ML) INTRAVENOUS AS NEEDED
Status: DISCONTINUED | OUTPATIENT
Start: 2023-03-28 | End: 2023-03-28 | Stop reason: HOSPADM

## 2023-03-28 RX ORDER — SODIUM CHLORIDE 0.9 % (FLUSH) 0.9 %
5-40 SYRINGE (ML) INJECTION AS NEEDED
Status: DISCONTINUED | OUTPATIENT
Start: 2023-03-28 | End: 2023-03-30 | Stop reason: HOSPADM

## 2023-03-28 RX ORDER — PREGABALIN 150 MG/1
150 CAPSULE ORAL ONCE
Status: DISCONTINUED | OUTPATIENT
Start: 2023-03-28 | End: 2023-03-28 | Stop reason: HOSPADM

## 2023-03-28 RX ORDER — ALBUMIN HUMAN 50 G/1000ML
SOLUTION INTRAVENOUS
Status: DISPENSED
Start: 2023-03-28 | End: 2023-03-29

## 2023-03-28 RX ORDER — LIDOCAINE HYDROCHLORIDE 10 MG/ML
0.1 INJECTION, SOLUTION EPIDURAL; INFILTRATION; INTRACAUDAL; PERINEURAL AS NEEDED
Status: DISCONTINUED | OUTPATIENT
Start: 2023-03-28 | End: 2023-03-28 | Stop reason: HOSPADM

## 2023-03-28 RX ORDER — VENLAFAXINE HYDROCHLORIDE 150 MG/1
150 CAPSULE, EXTENDED RELEASE ORAL 2 TIMES DAILY
Status: DISCONTINUED | OUTPATIENT
Start: 2023-03-28 | End: 2023-03-30 | Stop reason: HOSPADM

## 2023-03-28 RX ORDER — OXYCODONE HYDROCHLORIDE 5 MG/1
5 TABLET ORAL
Status: DISCONTINUED | OUTPATIENT
Start: 2023-03-28 | End: 2023-03-30 | Stop reason: HOSPADM

## 2023-03-28 RX ORDER — CYCLOBENZAPRINE HCL 10 MG
10 TABLET ORAL
Status: DISCONTINUED | OUTPATIENT
Start: 2023-03-28 | End: 2023-03-30 | Stop reason: HOSPADM

## 2023-03-28 RX ORDER — GUAIFENESIN 100 MG/5ML
81 LIQUID (ML) ORAL DAILY
Status: DISCONTINUED | OUTPATIENT
Start: 2023-03-29 | End: 2023-03-30 | Stop reason: HOSPADM

## 2023-03-28 RX ORDER — SODIUM CHLORIDE 0.9 % (FLUSH) 0.9 %
5-40 SYRINGE (ML) INJECTION EVERY 8 HOURS
Status: DISCONTINUED | OUTPATIENT
Start: 2023-03-28 | End: 2023-03-28 | Stop reason: HOSPADM

## 2023-03-28 RX ORDER — SODIUM CHLORIDE, SODIUM LACTATE, POTASSIUM CHLORIDE, CALCIUM CHLORIDE 600; 310; 30; 20 MG/100ML; MG/100ML; MG/100ML; MG/100ML
INJECTION, SOLUTION INTRAVENOUS
Status: DISCONTINUED | OUTPATIENT
Start: 2023-03-28 | End: 2023-03-28 | Stop reason: HOSPADM

## 2023-03-28 RX ORDER — ACETAMINOPHEN 500 MG
1000 TABLET ORAL ONCE
Status: COMPLETED | OUTPATIENT
Start: 2023-03-28 | End: 2023-03-28

## 2023-03-28 RX ORDER — EPHEDRINE SULFATE/0.9% NACL/PF 50 MG/5 ML
SYRINGE (ML) INTRAVENOUS
Status: COMPLETED
Start: 2023-03-28 | End: 2023-03-28

## 2023-03-28 RX ORDER — HYDROMORPHONE HYDROCHLORIDE 1 MG/ML
1 INJECTION, SOLUTION INTRAMUSCULAR; INTRAVENOUS; SUBCUTANEOUS
Status: ACTIVE | OUTPATIENT
Start: 2023-03-28 | End: 2023-03-29

## 2023-03-28 RX ORDER — LOSARTAN POTASSIUM 50 MG/1
100 TABLET ORAL
Status: DISCONTINUED | OUTPATIENT
Start: 2023-03-29 | End: 2023-03-30 | Stop reason: HOSPADM

## 2023-03-28 RX ORDER — MORPHINE SULFATE 4 MG/ML
2 INJECTION INTRAVENOUS
Status: DISCONTINUED | OUTPATIENT
Start: 2023-03-28 | End: 2023-03-28 | Stop reason: HOSPADM

## 2023-03-28 RX ORDER — HYDROMORPHONE HYDROCHLORIDE 1 MG/ML
.2-.5 INJECTION, SOLUTION INTRAMUSCULAR; INTRAVENOUS; SUBCUTANEOUS
Status: DISCONTINUED | OUTPATIENT
Start: 2023-03-28 | End: 2023-03-28 | Stop reason: HOSPADM

## 2023-03-28 RX ORDER — SODIUM CHLORIDE 0.9 % (FLUSH) 0.9 %
5-40 SYRINGE (ML) INJECTION EVERY 8 HOURS
Status: DISCONTINUED | OUTPATIENT
Start: 2023-03-28 | End: 2023-03-30 | Stop reason: HOSPADM

## 2023-03-28 RX ORDER — DEXAMETHASONE SODIUM PHOSPHATE 4 MG/ML
INJECTION, SOLUTION INTRA-ARTICULAR; INTRALESIONAL; INTRAMUSCULAR; INTRAVENOUS; SOFT TISSUE AS NEEDED
Status: DISCONTINUED | OUTPATIENT
Start: 2023-03-28 | End: 2023-03-28 | Stop reason: HOSPADM

## 2023-03-28 RX ORDER — PRAVASTATIN SODIUM 10 MG/1
20 TABLET ORAL
Status: DISCONTINUED | OUTPATIENT
Start: 2023-03-28 | End: 2023-03-30 | Stop reason: HOSPADM

## 2023-03-28 RX ORDER — POLYETHYLENE GLYCOL 3350 17 G/17G
17 POWDER, FOR SOLUTION ORAL DAILY
Status: DISCONTINUED | OUTPATIENT
Start: 2023-03-29 | End: 2023-03-30 | Stop reason: HOSPADM

## 2023-03-28 RX ORDER — CARVEDILOL 12.5 MG/1
12.5 TABLET ORAL 2 TIMES DAILY WITH MEALS
Status: DISCONTINUED | OUTPATIENT
Start: 2023-03-28 | End: 2023-03-30 | Stop reason: HOSPADM

## 2023-03-28 RX ORDER — OXYCODONE HYDROCHLORIDE 5 MG/1
10-15 TABLET ORAL
Status: DISCONTINUED | OUTPATIENT
Start: 2023-03-28 | End: 2023-03-30 | Stop reason: HOSPADM

## 2023-03-28 RX ORDER — SODIUM CHLORIDE 0.9 % (FLUSH) 0.9 %
5-40 SYRINGE (ML) INJECTION AS NEEDED
Status: DISCONTINUED | OUTPATIENT
Start: 2023-03-28 | End: 2023-03-28 | Stop reason: HOSPADM

## 2023-03-28 RX ORDER — DIPHENHYDRAMINE HYDROCHLORIDE 50 MG/ML
12.5 INJECTION, SOLUTION INTRAMUSCULAR; INTRAVENOUS AS NEEDED
Status: DISCONTINUED | OUTPATIENT
Start: 2023-03-28 | End: 2023-03-28 | Stop reason: HOSPADM

## 2023-03-28 RX ORDER — MELATONIN
2000 DAILY
Status: DISCONTINUED | OUTPATIENT
Start: 2023-03-29 | End: 2023-03-30 | Stop reason: HOSPADM

## 2023-03-28 RX ORDER — ACETAMINOPHEN 500 MG
1000 TABLET ORAL EVERY 6 HOURS
Status: DISCONTINUED | OUTPATIENT
Start: 2023-03-28 | End: 2023-03-30 | Stop reason: HOSPADM

## 2023-03-28 RX ORDER — ONDANSETRON 2 MG/ML
INJECTION INTRAMUSCULAR; INTRAVENOUS AS NEEDED
Status: DISCONTINUED | OUTPATIENT
Start: 2023-03-28 | End: 2023-03-28 | Stop reason: HOSPADM

## 2023-03-28 RX ORDER — THERA TABS 400 MCG
1 TAB ORAL DAILY
Status: DISCONTINUED | OUTPATIENT
Start: 2023-03-29 | End: 2023-03-30 | Stop reason: HOSPADM

## 2023-03-28 RX ORDER — FUROSEMIDE 20 MG/1
40 TABLET ORAL
Status: DISCONTINUED | OUTPATIENT
Start: 2023-03-29 | End: 2023-03-30 | Stop reason: HOSPADM

## 2023-03-28 RX ORDER — POTASSIUM CHLORIDE 750 MG/1
10 TABLET, FILM COATED, EXTENDED RELEASE ORAL DAILY
Status: DISCONTINUED | OUTPATIENT
Start: 2023-03-29 | End: 2023-03-30 | Stop reason: HOSPADM

## 2023-03-28 RX ORDER — LEVOTHYROXINE SODIUM 75 UG/1
75 TABLET ORAL
Status: DISCONTINUED | OUTPATIENT
Start: 2023-03-29 | End: 2023-03-30 | Stop reason: HOSPADM

## 2023-03-28 RX ORDER — EPHEDRINE SULFATE/0.9% NACL/PF 50 MG/5 ML
SYRINGE (ML) INTRAVENOUS AS NEEDED
Status: DISCONTINUED | OUTPATIENT
Start: 2023-03-28 | End: 2023-03-28 | Stop reason: HOSPADM

## 2023-03-28 RX ORDER — ONDANSETRON 2 MG/ML
4 INJECTION INTRAMUSCULAR; INTRAVENOUS
Status: ACTIVE | OUTPATIENT
Start: 2023-03-28 | End: 2023-03-29

## 2023-03-28 RX ORDER — DEXMEDETOMIDINE HYDROCHLORIDE 100 UG/ML
INJECTION, SOLUTION INTRAVENOUS AS NEEDED
Status: DISCONTINUED | OUTPATIENT
Start: 2023-03-28 | End: 2023-03-28 | Stop reason: HOSPADM

## 2023-03-28 RX ORDER — HYDROXYZINE HYDROCHLORIDE 10 MG/1
10 TABLET, FILM COATED ORAL
Status: DISCONTINUED | OUTPATIENT
Start: 2023-03-28 | End: 2023-03-30 | Stop reason: HOSPADM

## 2023-03-28 RX ORDER — AMLODIPINE BESYLATE 5 MG/1
10 TABLET ORAL
Status: DISCONTINUED | OUTPATIENT
Start: 2023-03-29 | End: 2023-03-30 | Stop reason: HOSPADM

## 2023-03-28 RX ORDER — FAMOTIDINE 20 MG/1
20 TABLET, FILM COATED ORAL 2 TIMES DAILY
Status: DISCONTINUED | OUTPATIENT
Start: 2023-03-28 | End: 2023-03-30 | Stop reason: HOSPADM

## 2023-03-28 RX ORDER — NEOSTIGMINE METHYLSULFATE 1 MG/ML
INJECTION, SOLUTION INTRAVENOUS AS NEEDED
Status: DISCONTINUED | OUTPATIENT
Start: 2023-03-28 | End: 2023-03-28 | Stop reason: HOSPADM

## 2023-03-28 RX ORDER — ONDANSETRON 2 MG/ML
4 INJECTION INTRAMUSCULAR; INTRAVENOUS AS NEEDED
Status: DISCONTINUED | OUTPATIENT
Start: 2023-03-28 | End: 2023-03-28 | Stop reason: HOSPADM

## 2023-03-28 RX ORDER — FENTANYL CITRATE 50 UG/ML
INJECTION, SOLUTION INTRAMUSCULAR; INTRAVENOUS AS NEEDED
Status: DISCONTINUED | OUTPATIENT
Start: 2023-03-28 | End: 2023-03-28 | Stop reason: HOSPADM

## 2023-03-28 RX ORDER — AMOXICILLIN 250 MG
1 CAPSULE ORAL 2 TIMES DAILY
Status: DISCONTINUED | OUTPATIENT
Start: 2023-03-28 | End: 2023-03-30 | Stop reason: HOSPADM

## 2023-03-28 RX ORDER — LANOLIN ALCOHOL/MO/W.PET/CERES
1 CREAM (GRAM) TOPICAL
Status: DISCONTINUED | OUTPATIENT
Start: 2023-03-29 | End: 2023-03-30 | Stop reason: HOSPADM

## 2023-03-28 RX ORDER — LANOLIN ALCOHOL/MO/W.PET/CERES
9 CREAM (GRAM) TOPICAL
Status: DISCONTINUED | OUTPATIENT
Start: 2023-03-28 | End: 2023-03-30 | Stop reason: HOSPADM

## 2023-03-28 RX ORDER — LIDOCAINE HYDROCHLORIDE 20 MG/ML
INJECTION, SOLUTION EPIDURAL; INFILTRATION; INTRACAUDAL; PERINEURAL AS NEEDED
Status: DISCONTINUED | OUTPATIENT
Start: 2023-03-28 | End: 2023-03-28 | Stop reason: HOSPADM

## 2023-03-28 RX ORDER — EPHEDRINE SULFATE/0.9% NACL/PF 50 MG/5 ML
10 SYRINGE (ML) INTRAVENOUS ONCE
Status: COMPLETED | OUTPATIENT
Start: 2023-03-28 | End: 2023-03-28

## 2023-03-28 RX ORDER — FENTANYL CITRATE 50 UG/ML
25 INJECTION, SOLUTION INTRAMUSCULAR; INTRAVENOUS
Status: DISCONTINUED | OUTPATIENT
Start: 2023-03-28 | End: 2023-03-28 | Stop reason: HOSPADM

## 2023-03-28 RX ORDER — NALOXONE HYDROCHLORIDE 0.4 MG/ML
0.4 INJECTION, SOLUTION INTRAMUSCULAR; INTRAVENOUS; SUBCUTANEOUS AS NEEDED
Status: DISCONTINUED | OUTPATIENT
Start: 2023-03-28 | End: 2023-03-30 | Stop reason: HOSPADM

## 2023-03-28 RX ORDER — SODIUM CHLORIDE 9 MG/ML
125 INJECTION, SOLUTION INTRAVENOUS CONTINUOUS
Status: DISPENSED | OUTPATIENT
Start: 2023-03-28 | End: 2023-03-29

## 2023-03-28 RX ORDER — ALBUMIN HUMAN 50 G/1000ML
12.5 SOLUTION INTRAVENOUS ONCE
Status: COMPLETED | OUTPATIENT
Start: 2023-03-28 | End: 2023-03-28

## 2023-03-28 RX ORDER — LIDOCAINE HYDROCHLORIDE AND EPINEPHRINE 10; 10 MG/ML; UG/ML
INJECTION, SOLUTION INFILTRATION; PERINEURAL AS NEEDED
Status: DISCONTINUED | OUTPATIENT
Start: 2023-03-28 | End: 2023-03-28 | Stop reason: HOSPADM

## 2023-03-28 RX ORDER — DIAZEPAM 5 MG/1
5 TABLET ORAL
Status: DISCONTINUED | OUTPATIENT
Start: 2023-03-28 | End: 2023-03-30 | Stop reason: HOSPADM

## 2023-03-28 RX ORDER — ROPIVACAINE HYDROCHLORIDE 5 MG/ML
INJECTION, SOLUTION EPIDURAL; INFILTRATION; PERINEURAL AS NEEDED
Status: DISCONTINUED | OUTPATIENT
Start: 2023-03-28 | End: 2023-03-28 | Stop reason: HOSPADM

## 2023-03-28 RX ORDER — LABETALOL HYDROCHLORIDE 5 MG/ML
INJECTION, SOLUTION INTRAVENOUS AS NEEDED
Status: DISCONTINUED | OUTPATIENT
Start: 2023-03-28 | End: 2023-03-28 | Stop reason: HOSPADM

## 2023-03-28 RX ORDER — GABAPENTIN 300 MG/1
600 CAPSULE ORAL 3 TIMES DAILY
Status: DISCONTINUED | OUTPATIENT
Start: 2023-03-28 | End: 2023-03-30 | Stop reason: HOSPADM

## 2023-03-28 RX ORDER — PROPOFOL 10 MG/ML
INJECTION, EMULSION INTRAVENOUS AS NEEDED
Status: DISCONTINUED | OUTPATIENT
Start: 2023-03-28 | End: 2023-03-28 | Stop reason: HOSPADM

## 2023-03-28 RX ORDER — ROCURONIUM BROMIDE 10 MG/ML
INJECTION, SOLUTION INTRAVENOUS AS NEEDED
Status: DISCONTINUED | OUTPATIENT
Start: 2023-03-28 | End: 2023-03-28 | Stop reason: HOSPADM

## 2023-03-28 RX ORDER — FACIAL-BODY WIPES
10 EACH TOPICAL DAILY PRN
Status: DISCONTINUED | OUTPATIENT
Start: 2023-03-30 | End: 2023-03-30 | Stop reason: HOSPADM

## 2023-03-28 RX ADMIN — ACETAMINOPHEN 1000 MG: 500 TABLET ORAL at 18:52

## 2023-03-28 RX ADMIN — SODIUM CHLORIDE 125 ML/HR: 9 INJECTION, SOLUTION INTRAVENOUS at 14:37

## 2023-03-28 RX ADMIN — Medication 10 MG: at 13:17

## 2023-03-28 RX ADMIN — FENTANYL CITRATE 50 MCG: 50 INJECTION, SOLUTION INTRAMUSCULAR; INTRAVENOUS at 09:24

## 2023-03-28 RX ADMIN — Medication 20 MG: at 11:28

## 2023-03-28 RX ADMIN — Medication 120 MCG: at 09:30

## 2023-03-28 RX ADMIN — SODIUM CHLORIDE, SODIUM LACTATE, POTASSIUM CHLORIDE, AND CALCIUM CHLORIDE: 600; 310; 30; 20 INJECTION, SOLUTION INTRAVENOUS at 10:48

## 2023-03-28 RX ADMIN — LABETALOL HYDROCHLORIDE 10 MG: 5 INJECTION, SOLUTION INTRAVENOUS at 11:17

## 2023-03-28 RX ADMIN — Medication 3 AMPULE: at 08:20

## 2023-03-28 RX ADMIN — FENTANYL CITRATE 50 MCG: 50 INJECTION, SOLUTION INTRAMUSCULAR; INTRAVENOUS at 10:45

## 2023-03-28 RX ADMIN — GLYCOPYRROLATE 0.4 MG: 0.2 INJECTION, SOLUTION INTRAMUSCULAR; INTRAVENOUS at 11:11

## 2023-03-28 RX ADMIN — Medication 1000 MG: at 08:20

## 2023-03-28 RX ADMIN — OXYCODONE HYDROCHLORIDE 5 MG: 5 TABLET ORAL at 22:34

## 2023-03-28 RX ADMIN — DEXMEDETOMIDINE HYDROCHLORIDE 6 MCG: 100 INJECTION, SOLUTION, CONCENTRATE INTRAVENOUS at 11:07

## 2023-03-28 RX ADMIN — LIDOCAINE HYDROCHLORIDE 80 MG: 20 INJECTION, SOLUTION EPIDURAL; INFILTRATION; INTRACAUDAL; PERINEURAL at 09:24

## 2023-03-28 RX ADMIN — Medication 3 MG: at 11:11

## 2023-03-28 RX ADMIN — WATER 2 G: 1 INJECTION INTRAMUSCULAR; INTRAVENOUS; SUBCUTANEOUS at 09:32

## 2023-03-28 RX ADMIN — SODIUM CHLORIDE, PRESERVATIVE FREE 10 ML: 5 INJECTION INTRAVENOUS at 22:11

## 2023-03-28 RX ADMIN — Medication 120 MCG: at 09:28

## 2023-03-28 RX ADMIN — PROPOFOL 150 MG: 10 INJECTION, EMULSION INTRAVENOUS at 09:24

## 2023-03-28 RX ADMIN — SODIUM CHLORIDE, POTASSIUM CHLORIDE, SODIUM LACTATE AND CALCIUM CHLORIDE 500 ML: 600; 310; 30; 20 INJECTION, SOLUTION INTRAVENOUS at 12:25

## 2023-03-28 RX ADMIN — OXYCODONE HYDROCHLORIDE 5 MG: 5 TABLET ORAL at 18:52

## 2023-03-28 RX ADMIN — Medication 10 MG: at 11:42

## 2023-03-28 RX ADMIN — SODIUM CHLORIDE, POTASSIUM CHLORIDE, SODIUM LACTATE AND CALCIUM CHLORIDE 25 ML/HR: 600; 310; 30; 20 INJECTION, SOLUTION INTRAVENOUS at 08:44

## 2023-03-28 RX ADMIN — DEXAMETHASONE SODIUM PHOSPHATE 8 MG: 4 INJECTION, SOLUTION INTRAMUSCULAR; INTRAVENOUS at 09:34

## 2023-03-28 RX ADMIN — Medication 20 MG: at 10:18

## 2023-03-28 RX ADMIN — ROCURONIUM BROMIDE 10 MG: 10 INJECTION INTRAVENOUS at 10:43

## 2023-03-28 RX ADMIN — ROCURONIUM BROMIDE 40 MG: 10 INJECTION INTRAVENOUS at 09:25

## 2023-03-28 RX ADMIN — WATER 2 G: 1 INJECTION INTRAMUSCULAR; INTRAVENOUS; SUBCUTANEOUS at 18:52

## 2023-03-28 RX ADMIN — SODIUM CHLORIDE 125 ML/HR: 9 INJECTION, SOLUTION INTRAVENOUS at 22:34

## 2023-03-28 RX ADMIN — FENTANYL CITRATE 25 MCG: 50 INJECTION INTRAMUSCULAR; INTRAVENOUS at 11:59

## 2023-03-28 RX ADMIN — ONDANSETRON HYDROCHLORIDE 4 MG: 2 INJECTION, SOLUTION INTRAMUSCULAR; INTRAVENOUS at 11:08

## 2023-03-28 RX ADMIN — ALBUMIN (HUMAN) 12.5 G: 12.5 INJECTION, SOLUTION INTRAVENOUS at 12:47

## 2023-03-28 RX ADMIN — Medication 10 MG: at 10:05

## 2023-03-28 RX ADMIN — FAMOTIDINE 20 MG: 20 TABLET, FILM COATED ORAL at 18:52

## 2023-03-28 RX ADMIN — PRAVASTATIN SODIUM 20 MG: 10 TABLET ORAL at 22:11

## 2023-03-28 RX ADMIN — GABAPENTIN 600 MG: 300 CAPSULE ORAL at 18:52

## 2023-03-28 RX ADMIN — SODIUM CHLORIDE, SODIUM LACTATE, POTASSIUM CHLORIDE, AND CALCIUM CHLORIDE: 600; 310; 30; 20 INJECTION, SOLUTION INTRAVENOUS at 09:20

## 2023-03-28 RX ADMIN — Medication 200 MCG: at 10:01

## 2023-03-28 RX ADMIN — VENLAFAXINE HYDROCHLORIDE 150 MG: 150 CAPSULE, EXTENDED RELEASE ORAL at 18:52

## 2023-03-28 RX ADMIN — SODIUM CHLORIDE 30 MCG/MIN: 900 INJECTION, SOLUTION INTRAVENOUS at 10:36

## 2023-03-28 RX ADMIN — DOCUSATE SODIUM 50MG AND SENNOSIDES 8.6MG 1 TABLET: 8.6; 5 TABLET, FILM COATED ORAL at 18:52

## 2023-03-28 RX ADMIN — OXYCODONE HYDROCHLORIDE 5 MG: 5 TABLET ORAL at 14:54

## 2023-03-28 RX ADMIN — Medication 20 MG: at 10:32

## 2023-03-28 RX ADMIN — Medication 1 LOZENGE: at 22:34

## 2023-03-28 RX ADMIN — GABAPENTIN 600 MG: 300 CAPSULE ORAL at 22:11

## 2023-03-28 RX ADMIN — SODIUM CHLORIDE, PRESERVATIVE FREE 10 ML: 5 INJECTION INTRAVENOUS at 18:53

## 2023-03-28 NOTE — PERIOP NOTES
Irrisept Wound Debridement and Cleansing System    Ref: Scar Watts: [id number]  LOT: [id number]  Expiration Date: 01-  Used for irrigation during procedure    Floseal Hmostatic Matrix, 10ml  REF: [id number]  LOT: [id number]  EXP: 02-  Used during procedure

## 2023-03-28 NOTE — PERIOP NOTES
TRANSFER - OUT REPORT:    Verbal report given to EITAN Medina(name) on Adriano Baez  being transferred to [facility](unit) for routine post - op       Report consisted of patients Situation, Background, Assessment and   Recommendations(SBAR). Information from the following report(s) OR Summary, Procedure Summary, Intake/Output, and MAR was reviewed with the receiving nurse. Opportunity for questions and clarification was provided. Patient transported with:   O2 @ 2 liters  Tech    Reviewed with receiving nurse that only PACU orders were released. Postop orders will need to be released when pt gets to room.

## 2023-03-28 NOTE — PROGRESS NOTES
TRANSFER - IN REPORT:    Verbal report received from [id number] [facility] and student and RL Kraft CRNA on Amy Carrier  being received from OR for routine post - op      Report consisted of patients Situation, Background, Assessment and   Recommendations(SBAR). Information from the following report(s) SBAR, Kardex, OR Summary, and MAR was reviewed with the receiving nurse. Opportunity for questions and clarification was provided. Assessment completed upon patients arrival to unit and care assumed.

## 2023-03-28 NOTE — PROGRESS NOTES
CM following planned spinal surgery patient to assist with potential d/c needs.      [address], [place], [address]

## 2023-03-28 NOTE — BRIEF OP NOTE
Brief Postoperative Note    Patient: Annabel Jeronimo  YOB: 1950  MRN: [id number]    Date of Procedure: 3/28/2023     Pre-Op Diagnosis: LUMBAR PAIN/SPONDYLOSIS/DEGENERATIVE DISC DISEASE/ SPINAL STENOSIS WITH NEUROGENIC CLAUDICATION/ RADICULOPATHY/SCIATICA/DEGENERATIVE SCOLIOSIS/ FORAMINAL STENOSIS    Post-Op Diagnosis: Same as preoperative diagnosis. Procedure(s):  L5-S1 POSTERIOR DECOMPRESSION AND FUSION     Surgeon(s):  Lonnie Andrade MD    Surgical Assistant: Physician Assistant: GIORGIO Vaca    Anesthesia: General     Estimated Blood Loss (mL): less than 50     Complications: None    Specimens: * No specimens in log *     Implants:   Implant Name Type Inv.  Item Serial No.  Lot No. LRB No. Used Action   ALLOGRAFT BNE SM 5 CC DBM FIBER OSTEOSTRAND PLUS - [id number]  ALLOGRAFT BNE SM 5 CC DBM FIBER OSTEOSTRAND PLUS [id number] [place] [id number] N/A 1 Implanted   SCREW SPNL STAR 6.5X45 MM MARINER - SNA  SCREW SPNL STAR 6.5X45 MM MARINER NA [employer] NA N/A 4 Implanted   MIS Tulip   NA  [employer] NA N/A 4 Implanted   SCREW SET SPNL MARINER - SNA  SCREW SET SPNL MARINER NA [employer] NA N/A 4 Implanted   LEAH SPNL PRECONTR 5.5X40 MM MARINER MIS - SNA  LEAH SPNL PRECONTR 5.5X40 MM MARINER MIS NA [employer] NA N/A 1 Implanted   LEAH SPNL PRECONTR 5.5X45 MM MARINER MIS - SNA  LEAH SPNL PRECONTR 5.5X45 MM MARINER MIS NA [employer] NA N/A 1 Implanted       Drains: * No LDAs found *    Findings: Stenosis    Electronically Signed by Chris Rebolledo MD on 3/28/2023 at 11:07 AM

## 2023-03-28 NOTE — PROGRESS NOTES
Problem: Mobility Impaired (Adult and Pediatric)  Goal: *Acute Goals and Plan of Care (Insert Text)  Description: FUNCTIONAL STATUS PRIOR TO ADMISSION: Patient was modified independent using a single point cane for functional mobility. HOME SUPPORT PRIOR TO ADMISSION: The patient lived alone with no local support. Physical Therapy Goals  Initiated 3/28/2023    1. Patient will move from supine to sit and sit to supine , scoot up and down, and roll side to side in bed with modified independence within 4 days. 2. Patient will perform sit to stand with modified independence within 4 days. 3. Patient will ambulate with modified independence for 300 feet with the least restrictive device within 4 days. 4. Patient will ascend/descend 4 stairs with 1 handrail(s) with minimal assistance/contact guard assist within 4 days. 5. Patient will verbalize and demonstrate understanding of spinal precautions (No bending, lifting greater than 5 lbs, or twisting; log-roll technique; frequent repositioning as instructed) within 4 days. Outcome: Not Met   PHYSICAL THERAPY EVALUATION  Patient: Jack Smalls (1 y.o. female)  Date: 3/28/2023  Primary Diagnosis: Spinal stenosis [M48.00]  Procedure(s) (LRB):  L5-S1 POSTERIOR DECOMPRESSION AND FUSION  (N/A) Day of Surgery   Precautions:   Spinal    ASSESSMENT  Based on the objective data described below, the patient presents with decreased functional mobility, decreased strength, and increased pain s/p L5-S1 posterior decompression and fusion POD 0. Pt on 1.5 L/min oxygen by NC, was mobilized on RA with all VS remaining stable throughout assessment. NC replaced at end of session. Pt was min A for supine>sit, mod A for sit>supine, CGA for stand pivot transfer to bedside commode, and CGA with RW during ambulation. Ambulation x100 ft with decreased gait speed and shortened steps bilaterally, no LOB or unsteadiness noted throughout.  Pt reports resolution of radicular symptoms LLE following surgery. Pt is below baseline functional status and would benefit from skilled PT in acute care setting to address impairments and improve mobility status. Recommend HH at discharge and increased family support. Current Level of Function Impacting Discharge (mobility/balance): CGA ambulation with RW, min A supine>sit, mod A sit>supine, min A sit>stand    Functional Outcome Measure: The patient scored 19/24 on the [facility] outcome measure which is indicative of discharge to home health. Other factors to consider for discharge: Daughter will be staying with pt for 6 weeks      Patient will benefit from skilled therapy intervention to address the above noted impairments. PLAN :  Recommendations and Planned Interventions: bed mobility training, transfer training, gait training, therapeutic exercises, neuromuscular re-education, patient and family training/education, and therapeutic activities      Frequency/Duration: Patient will be followed by physical therapy:  twice daily to address goals. Recommendation for discharge: (in order for the patient to meet his/her long term goals)  Physical therapy at least 2 days/week in the home     This discharge recommendation:  A follow-up discussion with the attending provider and/or case management is planned    IF patient discharges home will need the following DME: rolling walker         SUBJECTIVE:   Patient stated Feel pretty good besides this back.     OBJECTIVE DATA SUMMARY:   HISTORY:    Past Medical History:   Diagnosis Date    Acid reflux     Adverse effect of anesthesia     slow to wake after surgery    Arrhythmia     MVP    Arthritis     Autoimmune disease (HCC)     lichen planus    CAD (coronary artery disease)     MVP    Fibromyalgia     GERD (gastroesophageal reflux disease)     Hypertension     Menopause     Nausea & vomiting     Pancreatitis     Plantar fasciitis     Thyroid disease     TMJ (dislocation of temporomandibular joint) Past Surgical History:   Procedure Laterality Date    COLONOSCOPY,DIAGNOSTIC  10/1/2015         HX APPENDECTOMY      HX CHOLECYSTECTOMY      HX GYN      d & c    HX ORTHOPAEDIC  2/16/15    LEFT TOTAL KNEE ARTHROPLASTY WITH NAVIGATION     HX TONSILLECTOMY      HX UROLOGICAL      suburethral sling    NH ARTHROSCOPY TEMPOROMANDIBULAR JOINT SURGICAL      NH ARTHRP KNE CONDYLE&PLATU MEDIAL&LAT COMPARTMENTS  2014/June    right       Personal factors and/or comorbidities impacting plan of care: Daughter will be staying with pt for 6 weeks    Home Situation  Home Environment: Private residence  # Steps to Enter: 4  Rails to Enter: Yes  Hand Rails : Bilateral  One/Two Story Residence: One story  Living Alone: Yes (Daughter will be staying for 6 weeks)  Support Systems: Child(sheeba)  Current DME Used/Available at Home: Cane, straight, Raised toilet seat, Grab bars, Shower chair  Tub or Shower Type: Tub/Shower combination    EXAMINATION/PRESENTATION/DECISION MAKING:   Critical Behavior:  Neurologic State: Alert  Orientation Level: Oriented X4  Cognition: Appropriate decision making, Appropriate for age attention/concentration, Appropriate safety awareness     Hearing: Auditory  Auditory Impairment: None  Hearing Aids/Status: Does not own  Range Of Motion:  AROM: Generally decreased, functional    Strength:    Strength: Generally decreased, functional    Tone & Sensation:   Tone: Normal  Sensation: Intact    Coordination:  Coordination: Generally decreased, functional    Functional Mobility:  Bed Mobility:  Rolling: Modified independent  Supine to Sit: Minimum assistance;Assist x1  Sit to Supine:  Moderate assistance;Assist x1  Scooting: Modified independent  Transfers:  Sit to Stand: Minimum assistance  Stand to Sit: Contact guard assistance  Stand Pivot Transfers: Contact guard assistance     Balance:   Sitting: Intact  Standing: Impaired  Standing - Static: Good;Constant support  Standing - Dynamic : Constant support;Good  Ambulation/Gait Training:  Distance (ft): 100 Feet (ft)  Assistive Device: Walker, rolling  Ambulation - Level of Assistance: Contact guard assistance  Gait Abnormalities: Decreased step clearance  Speed/Елена: Pace decreased (<100 feet/min); Slow  Step Length: Right shortened;Left shortened    Functional Measure:    [facility] AM-PAC®      Basic Mobility Inpatient Short Form (6-Clicks) Version 2  How much HELP from another person do you currently need. .. (If the patient hasn't done an activity recently, how much help from another person do you think they would need if they tried?) Total A Lot A Little None   1. Turning from your back to your side while in a flat bed without using bedrails? []  1 []  2 []  3  [x]  4   2. Moving from lying on your back to sitting on the side of a flat bed without using bedrails? []  1 []  2 [x]  3  []  4   3. Moving to and from a bed to a chair (including a wheelchair)? []  1 []  2 [x]  3  []  4   4. Standing up from a chair using your arms (e.g. wheelchair or bedside chair)? []  1 []  2 [x]  3  []  4   5. Walking in hospital room? []  1 []  2 [x]  3  []  4   6. Climbing 3-5 steps with a railing? []  1 []  2 [x]  3  []  4     Raw Score: 19/24                            Cutoff score ?171,2,3 had higher odds of discharging home with home health or need of SNF/IPR. [facility] Bethany Marti. Validity of the AM-PAC 6-Clicks Inpatient Daily Activity and Basic Mobility Short Forms. Physical Therapy Mar 2014, [phone]) [phone]; DOI: 10.2522/ptj.56931593  2. Emely Marks. Association of AM-PAC \"6-Clicks\" Basic Mobility and Daily Activity Scores With Discharge Destination. Phys Ther. 2021 Apr 4;101(4):wxbc661. doi: 10.1093/ptj/hbig602. PMID: 65723039. V Jennifer Mccarthy, Lucero LOWE, Viann Favre, Leonor GUAN, Rajni SALINAS.  Activity Measure for Post-Acute Care \"6-Clicks\" Basic Mobility Scores Predict Discharge Destination After Acute Care Hospitalization in Select Patient Groups: A Retrospective, Observational Study. Arch Rehabil Res Clin Transl. 2022 Jul 16;4(3):139453. doi: 10.1016/j.arrct. 5369.228029. PMID: 79864184; PMCID: [id number]. 4. Clint Ferguson Ni P. AM-PAC Short Forms Manual 4.0. Revised 2/2020. Physical Therapy Evaluation Charge Determination   History Examination Presentation Decision-Making   MEDIUM  Complexity : 1-2 comorbidities / personal factors will impact the outcome/ POC  LOW Complexity : 1-2 Standardized tests and measures addressing body structure, function, activity limitation and / or participation in recreation  LOW Complexity : Stable, uncomplicated  Other outcome measures 19/24 [facility]  LOW       Based on the above components, the patient evaluation is determined to be of the following complexity level: LOW     Pain Rating:  Beginning PT, 9/10, had just received pain meds from RN    Activity Tolerance:   Good    After treatment patient left in no apparent distress:   Patient positioned in R quarter turn sidelying for pressure relief, Call bell within reach, Bed / chair alarm activated, Caregiver / family present, and Side rails x 3    COMMUNICATION/EDUCATION:   The patients plan of care was discussed with: Registered nurse. Fall prevention education was provided and the patient/caregiver indicated understanding. and Patient/family have participated as able in goal setting and plan of care. Treatment was performed with direct supervision by Birgit Garcia, PT,  DPT.     Thank you for this referral.  Radha Genao, SPT   Time Calculation: 34 mins

## 2023-03-28 NOTE — PERIOP NOTES
8646 - PT DENIES FEVER, COLD, COUGH, SOB. N/V, DIARRHEA. .. PRE-OP TCHING DONE PT VERBALIZES UNDERSTANDING. STRETCHER IN LOWEST POSITION, CB IN PLACE AND S RUP X2.

## 2023-03-28 NOTE — ANESTHESIA POSTPROCEDURE EVALUATION
Procedure(s):  L5-S1 POSTERIOR DECOMPRESSION AND FUSION . general    Anesthesia Post Evaluation        Patient location during evaluation: PACU  Note status: Adequate. Level of consciousness: responsive to verbal stimuli and sleepy but conscious  Pain management: satisfactory to patient  Airway patency: patent  Anesthetic complications: no  Cardiovascular status: acceptable  Respiratory status: acceptable  Hydration status: acceptable  Comments: +Post-Anesthesia Evaluation and Assessment    Patient: Casper Pelletier MRN: [id number]  SSN: [id number]   YOB: 1950  Age: 68 y.o. Sex: female      Cardiovascular Function/Vital Signs    BP (!) 97/40   Pulse 61   Temp 36.4 °C (97.5 °F)   Resp 16   Ht 5' 1.5\" (1.562 m)   Wt 81.2 kg (179 lb 0.2 oz)   SpO2 94%   BMI 33.28 kg/m²     Patient is status post Procedure(s):  L5-S1 POSTERIOR DECOMPRESSION AND FUSION . Nausea/Vomiting: Controlled. Postoperative hydration reviewed and adequate. Pain:  Pain Scale 1: Numeric (0 - 10) (03/28/23 1300)  Pain Intensity 1: 4 (03/28/23 1300)   Managed. Neurological Status:   Neuro (WDL): Exceptions to WDL (03/28/23 1150)   At baseline. Mental Status and Level of Consciousness: Arousable. Pulmonary Status:   O2 Device: Nasal cannula (03/28/23 1155)   Adequate oxygenation and airway patent. Complications related to anesthesia: None    Post-anesthesia assessment completed. No concerns. Signed By: Mark Ruiz MD    3/28/2023  Post anesthesia nausea and vomiting:  controlled      INITIAL Post-op Vital signs:   Vitals Value Taken Time   BP 94/47 03/28/23 1330   Temp 36.4 °C (97.5 °F) 03/28/23 1140   Pulse 69 03/28/23 1333   Resp 16 03/28/23 1333   SpO2 96 % 03/28/23 1333   Vitals shown include unvalidated device data.

## 2023-03-28 NOTE — H&P
Progress Notes  Albert Vazquez (Scribe)  Orthopedic Surgery  Cosigned by: Mary Dailey MD at 3/2/2023  5:00 PM   Expand All Collapse All  ASSESSMENT:  (M54.50) Lumbar pain  (primary encounter diagnosis)  (M47.816) Lumbar spondylosis  (M51.36) DDD (degenerative disc disease), lumbar  (B59.231) Spinal stenosis of lumbar region with neurogenic claudication  (M54.16) Radiculopathy of lumbar region  (M54.32) Left sided sciatica  (M41.50) Degenerative scoliosis  (M48.061) Foraminal stenosis of lumbar region         Patient Active Problem List   Diagnosis    Allergic conjunctivitis    Bilateral cataracts    Epiretinal membrane    Dry eyes    Fibromyalgia affecting multiple sites    Knee arthropathy    Osteoarthritis    Mitral valve prolapse determined by imaging    TMJ (dislocation of temporomandibular joint)    Vitreous floaters         Pain classified as: Mechanical back pain and Radicular pain     Impression: severe foraminal stenosis L5-S1 on the left; low back pain with LLE sciatica. Gait difficulties      PLAN:  Our practice treats spine problems with a comprehensive, multi-modal approach involving rehab, non-narcotic medications, injections and surgery (if clinically indicated). Today we discussed the diagnosis and engaged in the shared decision-making process regarding testing, treatment options, along with risks and benefits. Kinza Sevilla (and present family/friends) was/were an active participant on this conversation and had all questions satisfactorily answered. The agreed upon treatment plan after todays visit is as follows:     Rehab: N/A     Medications: N/A     Additional workup: N/A     Interventional procedures:  May schedule repeat L L4-L5 L5-S1 ESIs      Surgical options: L5-S1 posterior decompression and fusion scheduled. L-spine CT scan mazor protocol ordered for robotic pre-op planning.       Work status: N/A     Miscellaneous:      Activity modification and MD supervised home exercise program provided through [address] I have discussed the procedure in detail with the patient and mentioned complications, including but not limited to: death, permanent disability, heart attack, stroke, lung injury or infection, blindness, ileus, bladder or bowel problems, ureter injury, bleeding, nerve injury (including numbness, pain and weakness), paralysis (which may be permanent), failure to heal, failure to fuse bone together in fusion procedures, failure to relief symptoms, failure to relief pain, increased pain, need for further surgeries, failure or breakage or hardware, malpositioning of hardware, need to fuse or operate on additional levels determined either during or after surgery, destabilization of the spine (which may require fusion or later surgery), infections (which may or may not require additional surgery), dural tears (tears of the sac holding in nerves and spinal fluid), meningitis, voice changes, blood clots, pulmonary embolus, recurrent disc herniation, diaphragm paralysis, and anesthetic complications. Comorbidities such as obesity, smoking, rheumatoid arthritis, chronic steroid use and diabetes increase these risks. The patient understands and wants to proceed. The patient has been prescribed a LSO spinal orthosis for pain relief. The orthosis is medically necessary to reduce pain by restricting mobility of the trunk and to otherwise support weak spinal muscles and/or deformed spine. The patient will meet with our bracing coordinator to be fit for the brace. Treatment Plan:       Orders Placed This Encounter    Surgical Posting Sheet    CT lumbar spine without contrast ([id number])         Follow-up: Return for Follow up 2-3 weeks after surgery. HISTORY OF PRESENT ILLNESS:  Haile Burton; [id number]   Age: 68 y.o.  Sex: female   Pain score: Pain rating = 4  out of 10      Chief Complaint: Pain of the Lower Back        History of present illness:  Haile Burton is a 68 y.o. female with complaints of low back pain radiating into the left posterior thigh and leg into the foot. She also reports spasms and giving way of the leg. Her symptoms are chronic and worsen with walking, standing and specific movement. She presents today to discuss the results of her lumbar spine MRI. Araceli Méndez has tried 600 mg gabapentin TID. History of L L4-L5 L5-S1 that provided 50% relief but short lived. The pain is rated 4 out of 10 on the VAS. Ambulates with a cane. Araceli Méndez has functioning limiting radicular pain (limiting ADLs): Yes.      HPI           Patient Active Problem List     Diagnosis Date Noted    Fibromyalgia affecting multiple sites 03/02/2023    Osteoarthritis 03/02/2023    Mitral valve prolapse determined by imaging 03/02/2023    TMJ (dislocation of temporomandibular joint) 03/02/2023    Dry eyes 12/01/2020    Vitreous floaters 12/01/2020    Allergic conjunctivitis 06/10/2019    Bilateral cataracts 12/05/2018    Epiretinal membrane 12/05/2018    Knee arthropathy 06/23/2014               Family History   Problem Relation Age of Onset    Coronary artery disease Father      Clotting disorder Father      Anesthesia problems Father      No Known Problems Mother      No Known Problems Brother      No Known Problems Sister      No Known Problems Son      No Known Problems Daughter      No Known Problems Other      Diabetes Neg Hx           Social History            Tobacco Use    Smoking status: Never    Smokeless tobacco: Never   Substance Use Topics    Alcohol use: Yes       Comment: Glass of wine with dinner- not every night         Medical History        Past Medical History:   Diagnosis Date    Anxiety      Autoimmune disease      Depression      Fibromyalgia, primary      GERD (gastroesophageal reflux disease)      Hyperlipidemia      Hypertension      no relevant past medical history      Osteoarthritis              Surgical History         Past Surgical History: Procedure Laterality Date    FOOT SURGERY        HAND SURGERY        JOINT REPLACEMENT        KNEE SURGERY        NO RELEVANT SURGERIES                   Current Outpatient Medications:     amLODIPine (NORVASC) 10 MG tablet, , Disp: , Rfl:     aspirin 81 MG EC tablet, Take 81 mg by mouth daily, Disp: , Rfl:     carvedilol (COREG) 12.5 MG tablet, , Disp: , Rfl:     furosemide (LASIX) 40 MG tablet, Take 40 mg by mouth daily. , Disp: , Rfl:     gabapentin (NEURONTIN) 600 MG tablet, Take 2 tablets (1,200 mg total) by mouth 3 (three) times a day, Disp: 540 tablet, Rfl: 1    KLOR-CON 10 MEQ CR tablet, Take 10 mEq by mouth once daily with food, Disp: , Rfl:     levothyroxine (SYNTHROID, LEVOTHROID) 75 MCG tablet, , Disp: , Rfl:     losartan (COZAAR) 100 MG tablet, losartan 100 mg tablet  qd, Disp: , Rfl:     meloxicam (MOBIC) 15 MG tablet, , Disp: , Rfl:     omeprazole (PriLOSEC) 40 MG capsule, Take 40 mg by mouth daily. , Disp: , Rfl:     potassium chloride (KLOR-CON) 20 MEQ packet, , Disp: , Rfl:     pravastatin (PRAVACHOL) 20 MG tablet, Take 20 mg by mouth in the morning., Disp: , Rfl:     venlafaxine XR (EFFEXOR-XR) 150 MG 24 hr capsule, venlafaxine  mg capsule,extended release 24 hr, Disp: , Rfl:            Allergies   Allergen Reactions    Erythromycin Base Other (see comments)       Other reaction(s): Facial Swelling    Codeine         Other reaction(s): Other (comments)  Makes her foggy in the head    Sulfa Antibiotics Rash         ROS:   No new bowel or bladder incontinence. No fever. No saddle anesthesia. OBJECTIVE:  There were no vitals filed for this visit. Body mass index is 32.06 kg/m². , a BMI over 30 is considered obese and a BMI over 40 has been associated with a higher risk of surgical complications. Constitutional: No acute distress. Well nourished. Respiratory:  No labored breathing. Cardiovascular:  No marked cyanosis.   Skin:  No marked skin ulcers/lesions on bilateral upper or lower extremities. Psychiatric: Alert and oriented x3. Inspection: No gross deformity of bilateral upper or lower extremities. Musculoskeletal/Neurological:   Gait/balance:  - Ambulates with a cane. Thoracolumbar spine:  - No tenderness to palpation  - Full range of motion. Right lower extremity:  - No tenderness to palpation   - Full range of motion  - No pain with internal/external rotation of the hip  - Strength:  - 5 out of 5 to hip flexors  - 5 out of 5 to quads  - 5 out of 5 to TA  - 5 out of 5 to EHL  - 5 out of 5 to Gastroc/Soleus  - Negative straight leg raise  Left lower extremity:  - No tenderness to palpation   - Full range of motion  - No pain with internal/external rotation of the hip  - Strength:  - 5 out of 5 to hip flexors  - 5 out of 5 to quads  - 5 out of 5 to TA  - 5 out of 5 to EHL  - 5 out of 5 to Gastroc/Soleus  - Negative straight leg raise  Sensation:  - Intact to light touch  Reflexes:  - +1 Patellar tendon   - +1 Achilles tendon            RESULTS REVIEWED:          X-ray lumbar spine complete 4+ views     Result Date: 2/15/2023  Standing. AP, Lat, Flexion, Extension. Impression: X-rays of the lumbar spine show degenerative changes throughout. Scoliosis measuring 25 degrees. MRI lumbar spine without contrast ([id number])     Result Date: 2023  [facility] - MRI LUMB SPINE WO CONT Patient: Chari Waterman : 1950 Date of Service: 2023 7:46:29 AM Reason For Exam: Ordering Provider: Ilda Benjamin Physician: Della Saunders Signing Date: 2023 12:19:56 PM EXAM: MRI LUMB SPINE WO CONT Clinical history: Back pain INDICATION: . Low back pain, unspecified COMPARISON: 2022 TECHNIQUE: MR imaging of the lumbar spine was performed using the following sequences: sagittal T1, T2, STIR;  axial T1, T2. CONTRAST:  None. FINDINGS: Interval anterolisthesis of L4 on L5. Minimal anterolisthesis of L5 on S1. There is levoscoliosis.  Discogenic marrow degenerative changes L2-3. The conus medullaris terminates at L2. Signal and caliber of the distal spinal cord are within normal limits. The paraspinal soft tissues are within normal limits. Lower thoracic spine: No herniation or stenosis. L1-L2: Disc desiccation. Minimal facet arthropathy. The canal is patent. Foramina are patent L2-L3: Facet arthropathy. Disc desiccation. Dumbbell-shaped disc protrusion. The canal is patent. Minimal right foraminal stenosis. L3-L4: Mild facet arthropathy. Disc desiccation. Ligament flavum hypertrophy. The canal is patent. There is minimal right foraminal stenosis. L4-L5: Disc desiccation. Facet arthropathy is moderate. Circumferential bulge. The canal is patent. Foramina are patent L5-S1: Mild facet arthropathy. Disc desiccation. Left foraminal protrusion/osteophyte. The canal is patent. There is severe left foraminal stenosis. IMPRESSION: Multilevel disc and facet degenerative change with mild levoscoliosis of the lumbar spine. . Severe left foraminal stenosis at L5-S1. Table formatting from the original result was not included. Signing date/time: 2/24/2023 12:19 PM Signed by: Danyelle GANT          I personally and independently reviewed the lumbar spine MRI done at [facility] in February 2023 that shows degenerative changes throughout. Severe foraminal stenosis L5-S1 on the left. This note has been transcribed electronically using voice recognition and a trained scribe. It is believed to be accurate, but may contain errors secondary to technological limitations and other factors. Harris Navarro MD, personally, performed the services described in this documentation, as scribed in my presence, and it is both accurate and complete.   Scribed by: Christie Tim

## 2023-03-29 ENCOUNTER — APPOINTMENT (OUTPATIENT)
Dept: GENERAL RADIOLOGY | Age: 73
DRG: 458 | End: 2023-03-29
Attending: ORTHOPAEDIC SURGERY
Payer: MEDICARE

## 2023-03-29 LAB — HGB BLD-MCNC: 11.2 G/DL (ref 11.5–16)

## 2023-03-29 PROCEDURE — 74011250637 HC RX REV CODE- 250/637: Performed by: ORTHOPAEDIC SURGERY

## 2023-03-29 PROCEDURE — 74011250636 HC RX REV CODE- 250/636: Performed by: ORTHOPAEDIC SURGERY

## 2023-03-29 PROCEDURE — 94760 N-INVAS EAR/PLS OXIMETRY 1: CPT

## 2023-03-29 PROCEDURE — 65270000029 HC RM PRIVATE

## 2023-03-29 PROCEDURE — 97165 OT EVAL LOW COMPLEX 30 MIN: CPT

## 2023-03-29 PROCEDURE — 74011000250 HC RX REV CODE- 250: Performed by: ORTHOPAEDIC SURGERY

## 2023-03-29 PROCEDURE — 85018 HEMOGLOBIN: CPT

## 2023-03-29 PROCEDURE — 72100 X-RAY EXAM L-S SPINE 2/3 VWS: CPT

## 2023-03-29 PROCEDURE — 97116 GAIT TRAINING THERAPY: CPT

## 2023-03-29 PROCEDURE — 97535 SELF CARE MNGMENT TRAINING: CPT

## 2023-03-29 PROCEDURE — 77010033678 HC OXYGEN DAILY

## 2023-03-29 PROCEDURE — 97530 THERAPEUTIC ACTIVITIES: CPT

## 2023-03-29 PROCEDURE — 36415 COLL VENOUS BLD VENIPUNCTURE: CPT

## 2023-03-29 RX ORDER — ACETAMINOPHEN 500 MG
1000 TABLET ORAL EVERY 6 HOURS
Qty: 56 TABLET | Refills: 0 | Status: SHIPPED | OUTPATIENT
Start: 2023-03-29 | End: 2023-04-05

## 2023-03-29 RX ORDER — AMOXICILLIN 250 MG
1 CAPSULE ORAL 2 TIMES DAILY
Qty: 14 TABLET | Refills: 0 | Status: SHIPPED | OUTPATIENT
Start: 2023-03-29 | End: 2023-04-05

## 2023-03-29 RX ORDER — POLYETHYLENE GLYCOL 3350 17 G/17G
17 POWDER, FOR SOLUTION ORAL DAILY
Qty: 7 PACKET | Refills: 0 | Status: SHIPPED | OUTPATIENT
Start: 2023-03-30 | End: 2023-04-06

## 2023-03-29 RX ORDER — FUROSEMIDE 40 MG/1
40 TABLET ORAL
Qty: 30 TABLET | Refills: 0 | Status: SHIPPED
Start: 2023-03-29 | End: 2023-04-28

## 2023-03-29 RX ORDER — LOSARTAN POTASSIUM 100 MG/1
100 TABLET ORAL
Qty: 1 TABLET | Refills: 0 | Status: SHIPPED
Start: 2023-03-29 | End: 2023-03-30 | Stop reason: SDUPTHER

## 2023-03-29 RX ORDER — OXYCODONE HYDROCHLORIDE 5 MG/1
5-10 TABLET ORAL
Qty: 30 TABLET | Refills: 0 | Status: SHIPPED | OUTPATIENT
Start: 2023-03-29 | End: 2023-04-05

## 2023-03-29 RX ADMIN — DOCUSATE SODIUM 50MG AND SENNOSIDES 8.6MG 1 TABLET: 8.6; 5 TABLET, FILM COATED ORAL at 09:15

## 2023-03-29 RX ADMIN — ACETAMINOPHEN 1000 MG: 500 TABLET ORAL at 23:17

## 2023-03-29 RX ADMIN — SODIUM CHLORIDE, PRESERVATIVE FREE 10 ML: 5 INJECTION INTRAVENOUS at 06:31

## 2023-03-29 RX ADMIN — GABAPENTIN 600 MG: 300 CAPSULE ORAL at 09:14

## 2023-03-29 RX ADMIN — CARVEDILOL 12.5 MG: 12.5 TABLET, FILM COATED ORAL at 17:23

## 2023-03-29 RX ADMIN — FAMOTIDINE 20 MG: 20 TABLET, FILM COATED ORAL at 17:23

## 2023-03-29 RX ADMIN — VENLAFAXINE HYDROCHLORIDE 150 MG: 150 CAPSULE, EXTENDED RELEASE ORAL at 09:14

## 2023-03-29 RX ADMIN — ACETAMINOPHEN 1000 MG: 500 TABLET ORAL at 12:37

## 2023-03-29 RX ADMIN — CARVEDILOL 12.5 MG: 12.5 TABLET, FILM COATED ORAL at 09:23

## 2023-03-29 RX ADMIN — FERROUS SULFATE TAB 325 MG (65 MG ELEMENTAL FE) 325 MG: 325 (65 FE) TAB at 09:15

## 2023-03-29 RX ADMIN — THERA TABS 1 TABLET: TAB at 09:14

## 2023-03-29 RX ADMIN — ACETAMINOPHEN 1000 MG: 500 TABLET ORAL at 00:40

## 2023-03-29 RX ADMIN — PRAVASTATIN SODIUM 20 MG: 10 TABLET ORAL at 22:45

## 2023-03-29 RX ADMIN — MELATONIN 9 MG: at 22:45

## 2023-03-29 RX ADMIN — LEVOTHYROXINE SODIUM 75 MCG: 0.07 TABLET ORAL at 09:16

## 2023-03-29 RX ADMIN — SODIUM CHLORIDE, PRESERVATIVE FREE 10 ML: 5 INJECTION INTRAVENOUS at 22:46

## 2023-03-29 RX ADMIN — OXYCODONE HYDROCHLORIDE 10 MG: 5 TABLET ORAL at 09:26

## 2023-03-29 RX ADMIN — GABAPENTIN 600 MG: 300 CAPSULE ORAL at 22:45

## 2023-03-29 RX ADMIN — LOSARTAN POTASSIUM 100 MG: 50 TABLET, FILM COATED ORAL at 09:15

## 2023-03-29 RX ADMIN — SODIUM CHLORIDE, PRESERVATIVE FREE 10 ML: 5 INJECTION INTRAVENOUS at 17:23

## 2023-03-29 RX ADMIN — Medication 2000 UNITS: at 09:16

## 2023-03-29 RX ADMIN — GABAPENTIN 600 MG: 300 CAPSULE ORAL at 17:23

## 2023-03-29 RX ADMIN — POLYETHYLENE GLYCOL 3350 17 G: 17 POWDER, FOR SOLUTION ORAL at 09:14

## 2023-03-29 RX ADMIN — ASPIRIN 81 MG: 81 TABLET, CHEWABLE ORAL at 09:14

## 2023-03-29 RX ADMIN — POTASSIUM CHLORIDE 10 MEQ: 750 TABLET, FILM COATED, EXTENDED RELEASE ORAL at 09:15

## 2023-03-29 RX ADMIN — OXYCODONE HYDROCHLORIDE 10 MG: 5 TABLET ORAL at 20:17

## 2023-03-29 RX ADMIN — Medication 1 LOZENGE: at 23:17

## 2023-03-29 RX ADMIN — SODIUM CHLORIDE 125 ML/HR: 9 INJECTION, SOLUTION INTRAVENOUS at 06:30

## 2023-03-29 RX ADMIN — VENLAFAXINE HYDROCHLORIDE 150 MG: 150 CAPSULE, EXTENDED RELEASE ORAL at 17:23

## 2023-03-29 RX ADMIN — DOCUSATE SODIUM 50MG AND SENNOSIDES 8.6MG 1 TABLET: 8.6; 5 TABLET, FILM COATED ORAL at 17:23

## 2023-03-29 RX ADMIN — FAMOTIDINE 20 MG: 20 TABLET, FILM COATED ORAL at 09:14

## 2023-03-29 RX ADMIN — WATER 2 G: 1 INJECTION INTRAMUSCULAR; INTRAVENOUS; SUBCUTANEOUS at 00:39

## 2023-03-29 RX ADMIN — ACETAMINOPHEN 1000 MG: 500 TABLET ORAL at 06:31

## 2023-03-29 NOTE — PROGRESS NOTES
Ortho / Neurosurgery NP Note    POD# 1  s/p L5-S1 POSTERIOR DECOMPRESSION AND FUSION      Pt cleared therapy earlier today. BP labile/soft, asymptomatic. Holding BP meds. Completed orthostatics, 20 point drop in SBP remained asymptomatic  Appears very tired vs drowsy, reports little sleep   Pain well controlled. No complaints. Tolerating regular diet. No nausea. +voiding      Labs    Lab Results   Component Value Date/Time    HGB 11.2 (L) 03/29/2023 03:52 AM      Lab Results   Component Value Date/Time    INR 1.0 03/21/2023 01:11 PM      Lab Results   Component Value Date/Time    Sodium 139 03/21/2023 01:11 PM    Potassium 4.1 03/21/2023 01:11 PM    Chloride 106 03/21/2023 01:11 PM    CO2 30 03/21/2023 01:11 PM    Glucose 73 03/21/2023 01:11 PM    BUN 19 03/21/2023 01:11 PM    Creatinine 0.92 03/21/2023 01:11 PM    Calcium 9.6 03/21/2023 01:11 PM     Recent Glucose Results: No results found for: GLU, GLUPOC, GLUCPOC        Body mass index is 33.28 kg/m². : A BMI > 30 is classified as obesity and > 40 is classified as morbid obesity. WOOD dressing c.d.i  Cryotherapy in place over incision  Calves soft and supple; No pain with passive stretch  Sensation and motor intact   SCDs for mechanical DVT proph while in bed     PLAN:  1) Neurovascular assessment q4 hours   2) PT/OT - LSO when oob   3) Pain control - scheduled tylenol, prn oxycodone   4) Soft BP - Hold amlodipine, lasix and losartan. Receiving coreg. 5) Discharge plan - she appears quite exhausted this afternoon with labile BPs. Monitor overnight, likely home tomorrow morning  with daughter's support. Called daughter and given update, she feels more comfortable with this plan as well.        Jerry Bojorquez NP

## 2023-03-29 NOTE — PROGRESS NOTES
OCCUPATIONAL THERAPY EVALUATION/DISCHARGE  Patient: Christine Betancourt (89 y.o. female)  Date: 3/29/2023  Primary Diagnosis: Spinal stenosis [M48.00]  Procedure(s) (LRB):  L5-S1 POSTERIOR DECOMPRESSION AND FUSION  (N/A) 1 Day Post-Op   Precautions:  Spinal    ASSESSMENT  Based on the objective data described below, the patient presents with back precautions, decreased activity tolerance and impaired functional mobility following admission for L5-S1 posterior decompression and fusion. At baseline pt lives alone and is independent-mod I with ADLs and functional mobility. She was received seated EOB, agreeable to participate. Reviewed back precautions and pt verbalized understanding. Pt participated in ADL training for UB and LB dressing, managing LSO brace, and toileting, and performed with overall supervision-CGA. Using RW pt ambulated to bathroom, performed toilet transfer and brief standing grooming ADL with SBA, no LOB observed, min cues provided to maintain back precautions. She remained in chair at end of session with needs met and VSS (/73, HR 70 bpm, SpO2 99%). Pt and her daughter had no further questions or concerns for acute OT, and report daughter will be staying with pt at discharge to assist. Cleared by OT perspective to return home when medically ready. Current Level of Function (ADLs/self-care): CGA-mod I    Functional Outcome Measure: The patient scored 75/100 on the Barthel Index outcome measure.       Other factors to consider for discharge: supportive daughter     PLAN :  Recommendation for discharge: (in order for the patient to meet his/her long term goals)  Occupational therapy at least 2 days/week in the home     This discharge recommendation:  Has been made in collaboration with the attending provider and/or case management    IF patient discharges home will need the following DME: patient owns DME required for discharge       SUBJECTIVE:   Patient stated My daughter will be staying with me.     OBJECTIVE DATA SUMMARY:   HISTORY:   Past Medical History:   Diagnosis Date    Acid reflux     Adverse effect of anesthesia     slow to wake after surgery    Arrhythmia     MVP    Arthritis     Autoimmune disease (HCC)     lichen planus    CAD (coronary artery disease)     MVP    Fibromyalgia     GERD (gastroesophageal reflux disease)     Hypertension     Menopause     Nausea & vomiting     Pancreatitis     Plantar fasciitis     Thyroid disease     TMJ (dislocation of temporomandibular joint)      Past Surgical History:   Procedure Laterality Date    COLONOSCOPY,DIAGNOSTIC  10/1/2015         HX APPENDECTOMY      HX CHOLECYSTECTOMY      HX GYN      d & c    HX ORTHOPAEDIC  2/16/15    LEFT TOTAL KNEE ARTHROPLASTY WITH NAVIGATION     HX TONSILLECTOMY      HX UROLOGICAL      suburethral sling    DC ARTHROSCOPY TEMPOROMANDIBULAR JOINT SURGICAL      DC ARTHRP KNE CONDYLE&PLATU MEDIAL&LAT COMPARTMENTS  2014/June    right       Prior Level of Function/Environment/Context: Independent-mod I  Expanded or extensive additional review of patient history:     Home Situation  Home Environment: Private residence  # Steps to Enter: 4  Rails to Enter: Yes  Hand Rails : Bilateral  One/Two Story Residence: One story  Living Alone: Yes (Daughter will be staying for 6 weeks)  Support Systems: Child(sheeba)  Patient Expects to be Discharged to[de-identified] Home  Current DME Used/Available at Home: Cane, straight, Raised toilet seat, Grab bars, Shower chair  Tub or Shower Type: Tub/Shower combination    Hand dominance: Right    EXAMINATION OF PERFORMANCE DEFICITS:  Cognitive/Behavioral Status:  Neurologic State: Alert  Orientation Level: Oriented X4  Cognition: Follows commands        Hearing:   Auditory  Auditory Impairment: None  Hearing Aids/Status: Does not own    Vision/Perceptual:         Acuity: Within Defined Limits         Range of Motion:  AROM: Generally decreased, functional  PROM: Within functional limits Strength:  Strength: Generally decreased, functional       Coordination:  Coordination: Within functional limits  Fine Motor Skills-Upper: Left Intact; Right Intact    Gross Motor Skills-Upper: Left Intact; Right Intact    Tone & Sensation:  Tone: Normal          Balance:  Sitting: Intact  Standing: Intact; With support  Standing - Static: Good;Constant support  Standing - Dynamic : Constant support;Good;Fair    Functional Mobility and Transfers for ADLs:  Bed Mobility:  Rolling: Stand-by assistance  Supine to Sit: Stand-by assistance  Scooting: Stand-by assistance;Supervision    Transfers:  Sit to Stand: Contact guard assistance;Stand-by assistance  Stand to Sit: Stand-by assistance  Toilet Transfer : Stand-by assistance; Adaptive equipment    ADL Assessment:  Feeding: Independent    Oral Facial Hygiene/Grooming: Supervision (standing)    Bathing: Contact guard assistance; Adaptive equipment; Additional time    Type of Bath: Chlorhexidine (CHG); Partial    Upper Body Dressing: Setup;Supervision    Lower Body Dressing: Contact guard assistance;Setup; Adaptive equipment    Toileting: Supervision;Setup           ADL Intervention and task modifications:       Grooming  Position Performed: Standing  Washing Hands: Stand-by assistance     Upper Body Dressing Assistance  Orthotics(Brace): Supervision    Lower Body Dressing Assistance  Underpants: Set-up; Compensatory technique training  Pants With Elastic Waist: Set-up; Compensatory technique training  Socks: Compensatory technique training    Toileting  Bladder Hygiene: Supervision  Bowel Hygiene: Compensatory technique training     Patient instructed and demonstrated 3/3 back precautions with minimal cues. Patient instructed and indicated understanding the benefits of maintaining activity tolerance, functional mobility, and independence with self care tasks during acute stay  to ensure safe return home and to baseline.  Encouraged patient to increase frequency and duration OOB, not sitting longer than 30 mins without marching/walking with staff, be out of bed for all meals, perform daily ADLs (as approved by RN/MD regarding bathing etc), and performing functional mobility to/from bathroom. Patient instruction and indicated understanding on body mechanics, ergonomics and gravitational force on the spine during different body positions to plan activities in prep for return home to complete basic ADLs, instrumental ADLs and back to work safely. Bathing: Patient instructed and indicated understanding when bathing to not submerge wound in water, stand to shower or sponge bathe, cover wound with plastic and tape to ensure no water reaches bandage/wound without cues. Dressing brace: Patient instructed and demonstrated to don/doff Velcro on brace using dominant side, keeping non-dominant side intact. Patient instructed and demonstrated in meantime of being able to stand with back against wall to don/doff brace, to don/doff seated using lap and bed/chair surface to support brace while manipulating. Dressing lower body: Patient instructed to don brace first and on the benefits to remain seated to don all clothing to increase independence with precautions and pain management. Patient instructed and demonstrated tailor sitting for lower body dressing with Stand-by assistance and Setup. Toileting: Patient instructed on the benefits of using flushable wet wipes and toilet tongs if decreased reach or pain for yisel care. Also, the benefits of a reacher to aid in clothing management. Patient instruction and indicated understanding to not strain i.e. holding breath to bear down during a bowel movement, lifting/activity, and sexual activity.      Home safety: Patient instructed and indicated understanding on home modifications and safety [raise height of ADL objects (i.e. clothing, sink items, fridge items, items to mouth when grooming), appropriate height of chair surfaces, recliner safety, change of floor surfaces, clear pathways] to increase independence and fall prevention. Standing: Patient instructed and indicated understanding to walk up to sink/countertop/surfaces, step into walker, square off while using objects, slide objects along surfaces, to increase adherence to back precautions and fall prevention. Patient instructed to increase amount of time standing to increase independence and tolerance with ADLs. During prolonged standing, can open cabinet door or place foot on stool to decrease spinal pressure/increase pain. Tub transfer: Patient instructed and indicated understanding regarding when it is safe to begin transfer into tub (complete stairs with PT, advance exercises with PT high enough to clear tub height, and while clothes donned practice with another person present). Functional Measure:    Barthel Index:  Bathin  Bladder: 10  Bowels: 10  Groomin  Dressin  Feeding: 10  Mobility: 10  Stairs: 5  Toilet Use: 10  Transfer (Bed to Chair and Back): 10  Total: 75/100      The Barthel ADL Index: Guidelines  1. The index should be used as a record of what a patient does, not as a record of what a patient could do. 2. The main aim is to establish degree of independence from any help, physical or verbal, however minor and for whatever reason. 3. The need for supervision renders the patient not independent. 4. A patient's performance should be established using the best available evidence. Asking the patient, friends/relatives and nurses are the usual sources, but direct observation and common sense are also important. However direct testing is not needed. 5. Usually the patient's performance over the preceding 24-48 hours is important, but occasionally longer periods will be relevant. 6. Middle categories imply that the patient supplies over 50 per cent of the effort. 7. Use of aids to be independent is allowed.     Score Interpretation (from [address] [place] [address])    Independent   60-79 Minimally independent   40-59 Partially dependent   20-39 Very dependent   <20 Totally dependent     -Emiliano Carrion., BarthelTONY. (1965). Functional evaluation: the Barthel Index. [address] ([address] (1997). The Barthel activities of daily living index: self-reporting versus actual performance in the old (> or = 75 years). Journal of [address] [place] [address] 45(7), [facility], TROY, Tuyet Marie., Fouzia Escobar. (1999). Measuring the change in disability after inpatient rehabilitation; comparison of the responsiveness of the Barthel Index and Functional [place] Measure. Journal of Neurology, Neurosurgery, and Psychiatry, 66(4), 022-549. SUSANNA Celaya, HIRAM Donahue, & Hadley Randolph MYulyA. (2004) Assessment of post-stroke quality of life in cost-effectiveness studies: The usefulness of the Barthel Index and the EuroQoL-5D. Quality of Life Research, 15, 588-90        Occupational Therapy Evaluation Charge Determination   History Examination Decision-Making   LOW Complexity : Brief history review  LOW Complexity : 1-3 performance deficits relating to physical, cognitive , or psychosocial skils that result in activity limitations and / or participation restrictions  LOW Complexity : No comorbidities that affect functional and no verbal or physical assistance needed to complete eval tasks       Based on the above components, the patient evaluation is determined to be of the following complexity level: LOW   Pain Rating:  Pt reported no pain during session    Activity Tolerance:   Good    After treatment patient left in no apparent distress:    Sitting in chair, Call bell within reach, Bed / chair alarm activated, and Caregiver / family present    COMMUNICATION/EDUCATION:   The patients plan of care was discussed with: Physical therapist and Registered nurse.      Thank you for this referral.  Jennifer Madrigal OT  Time Calculation: 43 mins

## 2023-03-29 NOTE — PROGRESS NOTES
Problem: Mobility Impaired (Adult and Pediatric)  Goal: *Acute Goals and Plan of Care (Insert Text)  Description: FUNCTIONAL STATUS PRIOR TO ADMISSION: Patient was modified independent using a single point cane for functional mobility. HOME SUPPORT PRIOR TO ADMISSION: The patient lived alone with no local support. Physical Therapy Goals  Initiated 3/28/2023    1. Patient will move from supine to sit and sit to supine , scoot up and down, and roll side to side in bed with modified independence within 4 days. 2. Patient will perform sit to stand with modified independence within 4 days. 3. Patient will ambulate with modified independence for 300 feet with the least restrictive device within 4 days. 4. Patient will ascend/descend 4 stairs with 1 handrail(s) with minimal assistance/contact guard assist within 4 days. 5. Patient will verbalize and demonstrate understanding of spinal precautions (No bending, lifting greater than 5 lbs, or twisting; log-roll technique; frequent repositioning as instructed) within 4 days. Outcome: Resolved/Met  PHYSICAL THERAPY TREATMENT  Patient: Yana Lozano (72 y.o. female)  Date: 3/29/2023  Diagnosis: Spinal stenosis [M48.00] <principal problem not specified>  Procedure(s) (LRB):  L5-S1 POSTERIOR DECOMPRESSION AND FUSION  (N/A) 1 Day Post-Op  Precautions: Spinal No bending, no lifting greater than 5 lbs, no twisting, log-roll technique, repositioning every 20-30 min except when sleeping, brace when OOB (if ordered)  Chart, physical therapy assessment, plan of care and goals were reviewed. ASSESSMENT  Patient continues with skilled PT services and is progressing towards goals. Pt presents with decreased strength. Pt performed bed mobility at [facility]/supervision. Pt able to stand at [facility] with cueing for hand placement. Pt ambulated 300ft with RW at [facility]. Pt ascended/descended 4 steps with both railings at [facility] with cueing for sequencing.  Pt able to perform step over step when descending stairs. Pt performed a car transfer at [facility] with cueing for sequencing to maintain back precaution. Pt moving well and improved with time. From physical therapy stand point pt cleared for discharge. Current Level of Function Impacting Discharge (mobility/balance): mobility at [facility]/SBA     Other factors to consider for discharge: decreased strength, decreased endurance          PLAN :  Patient continues to benefit from skilled intervention to address the above impairments. Continue treatment per established plan of care. to address goals. Recommendation for discharge: (in order for the patient to meet his/her long term goals)  No skilled physical therapy/ follow up rehabilitation needs identified at this time. This discharge recommendation:  Has been made in collaboration with the attending provider and/or case management    IF patient discharges home will need the following DME: rolling walker       SUBJECTIVE:   Patient stated  I actually feel pretty good mild pain .     OBJECTIVE DATA SUMMARY:   Critical Behavior:  Neurologic State: Alert  Orientation Level: Oriented X4  Cognition: Appropriate decision making       Spinal diagnosis intervention:  The patient stated 3/3 back precautions when prompted. Reviewed all 3 back precautions, log roll technique, and sitting for 30 minutes at a time. The patient required min cues to maintain back precautions during functional activity. Functional Mobility Training:    Bed Mobility:  Log Rolling: Stand-by assistance  Supine to Sit: Stand-by assistance     Scooting: Stand-by assistance;Supervision        Transfers:  Sit to Stand: Contact guard assistance;Stand-by assistance  Stand to Sit: Stand-by assistance                             Balance:  Sitting: Intact  Standing: Intact; With support  Standing - Static: Good;Constant support  Standing - Dynamic : Constant support;Good;Fair  Ambulation/Gait Training:  Distance (ft): 300 Feet (ft)  Assistive Device: Gait belt;Walker, rolling;Brace/Splint  Ambulation - Level of Assistance: Contact guard assistance;Stand-by assistance        Gait Abnormalities: Decreased step clearance        Base of Support: Narrowed     Speed/Елена: Pace decreased (<100 feet/min)  Step Length: Right shortened;Left shortened        Number of Stairs Trained: 4  Stairs - Level of Assistance: Contact guard assistance;Stand-by assistance   Rail Use: Both  Pain Rating:  Pt reported mild incisional pain     Activity Tolerance:   WNL and Good, BP stable    After treatment patient left in no apparent distress: With transport for XR     COMMUNICATION/COLLABORATION:   The patients plan of care was discussed with: Registered nurse.      Tye Rios PTA   Time Calculation: 38 mins

## 2023-03-29 NOTE — PROGRESS NOTES
End of Shift Note    Bedside shift change report given to Vanessa Emerson RN (oncoming nurse) by Kleber Verma RN (offgoing nurse). Report included the following information SBAR, Kardex, Intake/Output, MAR, and Recent Results    Shift worked:  Day     Shift summary and any significant changes:     Vitals stable, voiding with bathroom privilege, alert and orientation. Pain managed with oxycodone. Concerns for physician to address:       Zone phone for oncoming shift:   1915       Activity:  Activity Level: Up with Assistance  Number times ambulated in hallways past shift: 1  Number of times OOB to chair past shift: 1    Cardiac:   Cardiac Monitoring: No      Cardiac Rhythm: Sinus Rhythm    Access:  Current line(s): PIV     Genitourinary:   Urinary status: voiding    Respiratory:   O2 Device: None (Room air)  Chronic home O2 use?: NO  Incentive spirometer at bedside: YES       GI:  Last Bowel Movement Date: 03/27/23  Current diet:  ADULT DIET Regular  Passing flatus: YES  Tolerating current diet: YES       Pain Management:   Patient states pain is manageable on current regimen: YES    Skin:  Bruno Score: 20  Interventions: float heels, increase time out of bed, and PT/OT consult    Patient Safety:  Fall Score:  Total Score: 3  Interventions: bed/chair alarm, assistive device (walker, cane, etc), gripper socks, pt to call before getting OOB, and stay with me (per policy)  High Fall Risk: Yes    Length of Stay:  Expected LOS: 2d 19h  Actual LOS: [address], RN

## 2023-03-29 NOTE — PROGRESS NOTES
Planned spinal surgery patient to d/c home today. No therapy needs for d/c. Pt does need a RW which has been approved through [facility] and provided to patient at bedside.      [address], [place], [address]

## 2023-03-29 NOTE — PROGRESS NOTES
ORTHO POST OP SPINE PROGRESS NOTE    2023  Admit Date: 3/28/2023  Admit Diagnosis: Spinal stenosis [M48.00]  Procedure: Procedure(s):  L5-S1 POSTERIOR DECOMPRESSION AND FUSION   Post Op day: 1 Day Post-Op    Subjective:     Nitin Ruvalcaba is a patient who has complaints of pain in the low back. Denies LLE pain s/p L5-S1 posterior decompression and fusion . Tolerating po  + voiding  Review of Systems: Pertinent items are noted in HPI. Objective:     PT/OT:   Distance Ambulated:           Time Ambulated (min):        Ambulation Response:        Assistive Device:              Assistive Device: Fall prevention device    Vital Signs:    Blood pressure 112/65, pulse 73, temperature 98.2 °F (36.8 °C), resp. rate 18, height 5' 1.5\" (1.562 m), weight 81.2 kg (179 lb 0.2 oz), SpO2 91 %. Temp (24hrs), Av.1 °F (36.7 °C), Min:97.5 °F (36.4 °C), Max:98.7 °F (37.1 °C)      No intake/output data recorded.  1901 -  0700  In: 1120 [I.V.:1120]  Out: 370 [Urine:350]    LAB:    Recent Labs     23  0352   HGB 11.2*       Wound/Drain Assessment:  Drain:      Dressing:     Physical Exam:  Neurological: no deficit  Incision clean, dry, and intact and WOOD holding. Small amount of dried blood on L WOOD and mild bruising   5/5 BLE    Assessment:      Patient Active Problem List   Diagnosis Code    Knee arthropathy M17.10    Spinal stenosis M48.00       Plan:     Continue PT/OT/Rehab  Discontinue:   Consult: PT  and OT    Discharge To:  home likely pending progress  S/p L5-S1 post decomp and fusion - LLE pain improved  PT/OT - LSO, WBAT  Pain control - sched tylenol, prn oxy  + voiding   D/c plan home likely.  Possibly today pending progress

## 2023-03-29 NOTE — PROGRESS NOTES
Problem: Falls - Risk of  Goal: *Absence of Falls  Description: Document Talat Uribe Fall Risk and appropriate interventions in the flowsheet.   3/29/2023 0326 by Clifm Lennox, RN  Outcome: Progressing Towards Goal  Note: Fall Risk Interventions:  Mobility Interventions: Bed/chair exit alarm, Communicate number of staff needed for ambulation/transfer    Mentation Interventions: Bed/chair exit alarm    Medication Interventions: Bed/chair exit alarm    Elimination Interventions: Call light in reach, Bed/chair exit alarm, Patient to call for help with toileting needs           3/29/2023 0324 by Clifm Lennox, RN  Outcome: Progressing Towards Goal  Note: Fall Risk Interventions:  Mobility Interventions: Bed/chair exit alarm, Communicate number of staff needed for ambulation/transfer    Mentation Interventions: Bed/chair exit alarm    Medication Interventions: Bed/chair exit alarm    Elimination Interventions: Call light in reach, Bed/chair exit alarm, Patient to call for help with toileting needs              Problem: Patient Education: Go to Patient Education Activity  Goal: Patient/Family Education  3/29/2023 0326 by Clifm Lennox, RN  Outcome: Progressing Towards Goal  3/29/2023 0324 by Clifm Lennox, RN  Outcome: Progressing Towards Goal     Problem: Patient Education: Go to Patient Education Activity  Goal: Patient/Family Education  3/29/2023 0326 by Clifm Lennox, RN  Outcome: Progressing Towards Goal  3/29/2023 0324 by Clifm Lennox, RN  Outcome: Progressing Towards Goal     Problem: Patient Education: Go to Patient Education Activity  Goal: Patient/Family Education  3/29/2023 0326 by Clifm Lennox, RN  Outcome: Progressing Towards Goal  3/29/2023 0324 by Clifm Lennox, RN  Outcome: Progressing Towards Goal

## 2023-03-29 NOTE — PROGRESS NOTES
Problem: Falls - Risk of  Goal: *Absence of Falls  Description: Document Evelin Lopes Fall Risk and appropriate interventions in the flowsheet.   Outcome: Progressing Towards Goal  Note: Fall Risk Interventions:  Mobility Interventions: Bed/chair exit alarm, Communicate number of staff needed for ambulation/transfer    Mentation Interventions: Bed/chair exit alarm    Medication Interventions: Bed/chair exit alarm    Elimination Interventions: Call light in reach, Bed/chair exit alarm, Patient to call for help with toileting needs              Problem: Patient Education: Go to Patient Education Activity  Goal: Patient/Family Education  Outcome: Progressing Towards Goal     Problem: Patient Education: Go to Patient Education Activity  Goal: Patient/Family Education  Outcome: Progressing Towards Goal     Problem: Patient Education: Go to Patient Education Activity  Goal: Patient/Family Education  Outcome: Progressing Towards Goal     Problem: Simple Spine Procedure:  Day of Surgery  Goal: Off Pathway (Use only if patient is Off Pathway)  Outcome: Progressing Towards Goal  Goal: Activity/Safety  Outcome: Progressing Towards Goal  Goal: Consults, if ordered  Outcome: Progressing Towards Goal  Goal: Nutrition/Diet  Outcome: Progressing Towards Goal  Goal: Discharge Planning  Outcome: Progressing Towards Goal  Goal: Medications  Outcome: Progressing Towards Goal  Goal: Respiratory  Outcome: Progressing Towards Goal  Goal: Treatments/Interventions/Procedures  Outcome: Progressing Towards Goal  Goal: Psychosocial  Outcome: Progressing Towards Goal  Goal: *Verbalizes understanding of type and use of pain medication  Outcome: Progressing Towards Goal  Goal: *Optimal pain control at patient's stated goal  Outcome: Progressing Towards Goal  Goal: *Verbalizes/demonstrates understanding of proper body mechanics and use of stabilization device if ordered  Outcome: Progressing Towards Goal  Goal: *Activity level attained as ordered  Outcome: Progressing Towards Goal  Goal: *Active bowel sounds  Outcome: Progressing Towards Goal  Goal: *Respiratory status stable  Outcome: Progressing Towards Goal  Goal: *Adequate urinary output  Outcome: Progressing Towards Goal  Goal: *Hemodynamically stable  Outcome: Progressing Towards Goal     Problem: Simple Spine Procedure:  Post Op Day 1/Day of Discharge  Goal: Off Pathway (Use only if patient is Off Pathway)  Outcome: Progressing Towards Goal  Goal: Activity/Safety  Outcome: Progressing Towards Goal  Goal: Nutrition/Diet  Outcome: Progressing Towards Goal  Goal: Discharge Planning  Outcome: Progressing Towards Goal  Goal: Medications  Outcome: Progressing Towards Goal  Goal: Respiratory  Outcome: Progressing Towards Goal  Goal: Treatments/Interventions/Procedures  Outcome: Progressing Towards Goal  Goal: Psychosocial  Outcome: Progressing Towards Goal     Problem: Simple Spine Procedure: Discharge Outcomes  Goal: *Optimal pain control at patient's stated goal  Outcome: Progressing Towards Goal  Goal: *Demonstrates ability to place and remove stabilization device  Outcome: Progressing Towards Goal  Goal: *Progress independence mobility/activities (eg: Mobility precautions)  Outcome: Progressing Towards Goal  Goal: *Resumes normal function of bladder and bowel  Outcome: Progressing Towards Goal  Goal: *Lungs clear or at baseline  Outcome: Progressing Towards Goal  Goal: *Verbalizes name, dosage, time, side effects, and number of days to continue medications  Outcome: Progressing Towards Goal  Goal: *Modified independence with transfers, ambulation on levels with assistance devices, stair climbing, ADL's  Outcome: Progressing Towards Goal  Goal: *Describes follow-up/return visits to physicians  Outcome: Progressing Towards Goal  Goal: *Describes available resources and support systems  Outcome: Progressing Towards Goal  Goal: *Labs within defined limits  Outcome: Progressing Towards Goal  Goal: *Tolerating diet  Outcome: Progressing Towards Goal

## 2023-03-29 NOTE — DISCHARGE SUMMARY
Spine Discharge Summary    Patient ID:  Hero Lopez  [id number]  female  68 y.o.  1950    Admit date: 3/28/2023    Discharge date: 3/29/2023    Admitting Physician: Elizabeth Bartlett MD     Consulting Physician(s):   Treatment Team: Attending Provider: Noemy Shepherd MD; Care Manager: Joaquim Dill Primary Nurse: Gurdeep Ledezma RN; Physical Therapist: Hugo Gonzales; Occupational Therapist: [place] Higuera OT; Utilization Review: Tierney Betancourt RN    Date of Surgery:   3/28/2023     Preoperative Diagnosis:  LUMBAR PAIN/SPONDYLOSIS/DEGENERATIVE DISC DISEASE/ SPINAL STENOSIS WITH NEUROGENIC CLAUDICATION/ RADICULOPATHY/SCIATICA/DEGENERATIVE SCOLIOSIS/ FORAMINAL STENOSIS    Postoperative Diagnosis:   LUMBAR PAIN/SPONDYLOSIS/DEGENERATIVE DISC DISEASE/ SPINAL STENOSIS WITH NEUROGENIC CLAUDICATION/ RADICULOPATHY/SCIATICA/DEGENERATIVE SCOLIOSIS/ FORAMINAL STENOSIS    Procedure(s):  L5-S1 POSTERIOR DECOMPRESSION AND FUSION      Anesthesia Type:   General     Surgeon: Noemy Shepherd MD                            HPI:  Pt is a 68 y.o. female who has a history of LUMBAR PAIN/SPONDYLOSIS/DEGENERATIVE DISC DISEASE/ SPINAL STENOSIS WITH NEUROGENIC CLAUDICATION/ RADICULOPATHY/SCIATICA/DEGENERATIVE SCOLIOSIS/ FORAMINAL STENOSIS  with pain and limitations of activities of daily living who presents at this time for a L5-S1 POSTERIOR DECOMPRESSION AND FUSION   following the failure of conservative management.     PMH:   Past Medical History:   Diagnosis Date    Acid reflux     Adverse effect of anesthesia     slow to wake after surgery    Arrhythmia     MVP    Arthritis     Autoimmune disease (HCC)     lichen planus    CAD (coronary artery disease)     MVP    Fibromyalgia     GERD (gastroesophageal reflux disease)     Hypertension     Menopause     Nausea & vomiting     Pancreatitis     Plantar fasciitis     Thyroid disease     TMJ (dislocation of temporomandibular joint)        Body mass index is 33.28 kg/m². : A BMI > 30 is classified as obesity and > 40 is classified as morbid obesity. Medications upon admission :   Prior to Admission Medications   Prescriptions Last Dose Informant Patient Reported? Taking? amLODIPine (NORVASC) 10 mg tablet 3/28/2023 at 0630  No Yes   Sig: Take 1 Tab by mouth every morning. DO NOT TAKE FOR BLOOD PRESSURE LESS THAN 130/80   aspirin 81 mg chewable tablet 3/21/2023  Yes Yes   Sig: Take 81 mg by mouth daily. carvediloL (COREG) 12.5 mg tablet 3/28/2023 at 0630  Yes Yes   Sig: Take 12.5 mg by mouth two (2) times daily (with meals). cholecalciferol (VITAMIN D3) (2,000 UNITS /50 MCG) cap capsule 3/28/2023 at 0630  Yes Yes   Sig: Take 2,000 Units by mouth daily. ferrous sulfate (IRON PO) 3/27/2023  Yes Yes   Sig: Take 65 mg by mouth daily. furosemide (LASIX) 40 mg tablet 3/28/2023  Yes No   Sig: Take 40 mg by mouth every morning.   gabapentin (NEURONTIN) 600 mg tablet 3/28/2023 at 0630  Yes Yes   Sig: Take 600 mg by mouth three (3) times daily. Indications: 1200 mg in AM; 1800 mg in PM   levothyroxine (SYNTHROID) 75 mcg tablet 3/28/2023 at 0630  Yes Yes   Sig: Take 75 mcg by mouth Daily (before breakfast). losartan (COZAAR) 100 mg tablet 3/28/2023 at 0630  No No   Sig: Take 1 Tab by mouth every morning. DO NOT TAKE FOR BLOOD PRESSURE LESS THAN 130/80   melatonin 10 mg tab 3/27/2023  Yes Yes   Sig: Take  by mouth nightly. meloxicam (MOBIC) 15 mg tablet 3/21/2023  Yes Yes   Sig: Take 15 mg by mouth daily. multivitamin (ONE A DAY) tablet 3/21/2023  Yes Yes   Sig: Take 0.5 tablets by mouth two (2) times a day. potassium chloride SA (MICRO-K) 10 mEq capsule 3/27/2023  Yes Yes   Sig: Take 10 mEq by mouth daily (with dinner). pravastatin (PRAVACHOL) 20 mg tablet 3/27/2023  Yes Yes   Sig: Take 20 mg by mouth nightly. venlafaxine-SR (EFFEXOR-XR) 150 mg capsule 3/28/2023 at 0630  Yes Yes   Sig: Take 150 mg by mouth two (2) times a day.  150 in the morning, 75 at night time Facility-Administered Medications: None        Allergies: Allergies   Allergen Reactions    Codeine Other (comments)     Makes her foggy in the head    Erythromycin Nausea and Vomiting    Sulfa (Sulfonamide Antibiotics) Rash        Hospital Course: The patient underwent surgery. Complications:  None; patient tolerated the procedure well. Was taken to the PACU in stable condition and then transferred to the ortho floor. Perioperative Antibiotics:  Ancef     Postoperative Pain Management:  Oxycodone & Tylenol     Postoperative transfusions:    Number of units banked PRBCs =   none     Post Op complications: none    Hemoglobin at discharge:    Lab Results   Component Value Date/Time    HGB 11.2 (L) 03/29/2023 03:52 AM    INR 1.0 03/21/2023 01:11 PM       Dressing remained clean, dry and intact. No significant erythema or swelling. Wound appears to be healing without any evidence of infection. Neurovascular exam found to be within normal limits. Physical Therapy started following surgery and participated in bed mobility, transfers and ambulation. Gait:  Gait  Speed/Елена: Pace decreased (<100 feet/min), Slow  Step Length: Right shortened, Left shortened  Gait Abnormalities: Decreased step clearance  Ambulation - Level of Assistance: Contact guard assistance  Distance (ft): 100 Feet (ft)  Assistive Device: Walker, rolling                   Discharged to: Home. Condition on Discharge:   stable    Discharge instructions:  - Take pain medications as prescribed  - Resume pre hospital diet      - Discharge activity: activity as tolerated  - Ambulate as tolerated  - Lumbar brace when oob  - Avoid bending, lifting and twisting  - Wound Care Keep wound clean and dry. See discharge instruction sheet.             -DISCHARGE MEDICATION LIST     Current Discharge Medication List        START taking these medications    Details   acetaminophen (TYLENOL) 500 mg tablet Take 2 Tablets by mouth every six (6) hours for 7 days. Qty: 56 Tablet, Refills: 0  Start date: 3/29/2023, End date: 4/5/2023      oxyCODONE IR (ROXICODONE) 5 mg immediate release tablet Take 1-2 Tablets by mouth every six (6) hours as needed for Pain for up to 7 days. Max Daily Amount: 40 mg. One tab for mild to moderate pain level 1-6, or 2 tabs for severe pain level 7-10  Qty: 30 Tablet, Refills: 0  Start date: 3/29/2023, End date: 4/5/2023    Associated Diagnoses: S/P lumbar spinal fusion      polyethylene glycol (MIRALAX) 17 gram packet Take 1 Packet by mouth daily for 7 days. Qty: 7 Packet, Refills: 0  Start date: 3/30/2023, End date: 4/6/2023      senna-docusate (PERICOLACE) 8.6-50 mg per tablet Take 1 Tablet by mouth two (2) times a day for 7 days. Qty: 14 Tablet, Refills: 0  Start date: 3/29/2023, End date: 4/5/2023           CONTINUE these medications which have CHANGED    Details   losartan (COZAAR) 100 mg tablet Take 1 Tablet by mouth every morning. DO NOT TAKE FOR BLOOD PRESSURE LESS THAN 130/80  Qty: 1 Tablet, Refills: 0  Start date: 3/29/2023      furosemide (LASIX) 40 mg tablet Take 1 Tablet by mouth every morning for 30 days. DO NOT TAKE IF BLOOD PRESSURE LESS THAN 130/80  Qty: 30 Tablet, Refills: 0  Start date: 3/29/2023, End date: 4/28/2023           CONTINUE these medications which have NOT CHANGED    Details   cholecalciferol (VITAMIN D3) (2,000 UNITS /50 MCG) cap capsule Take 2,000 Units by mouth daily. pravastatin (PRAVACHOL) 20 mg tablet Take 20 mg by mouth nightly. carvediloL (COREG) 12.5 mg tablet Take 12.5 mg by mouth two (2) times daily (with meals). aspirin 81 mg chewable tablet Take 81 mg by mouth daily. ferrous sulfate (IRON PO) Take 65 mg by mouth daily. amLODIPine (NORVASC) 10 mg tablet Take 1 Tab by mouth every morning. DO NOT TAKE FOR BLOOD PRESSURE LESS THAN 130/80  Qty: 1 Tab, Refills: 0      melatonin 10 mg tab Take  by mouth nightly.       multivitamin (ONE A DAY) tablet Take 0.5 tablets by mouth two (2) times a day. venlafaxine-SR (EFFEXOR-XR) 150 mg capsule Take 150 mg by mouth two (2) times a day. 150 in the morning, 75 at night time      gabapentin (NEURONTIN) 600 mg tablet Take 600 mg by mouth three (3) times daily. Indications: 1200 mg in AM; 1800 mg in PM      levothyroxine (SYNTHROID) 75 mcg tablet Take 75 mcg by mouth Daily (before breakfast). potassium chloride SA (MICRO-K) 10 mEq capsule Take 10 mEq by mouth daily (with dinner).            STOP taking these medications       meloxicam (MOBIC) 15 mg tablet Comments:   Reason for Stopping:            per medical continuation form      -Follow up in office in 2 weeks      Signed:  Deacon Dolan NP  Orthopaedic Nurse Practitioner    3/29/2023  11:00 AM

## 2023-03-29 NOTE — PROGRESS NOTES
End of Shift Note    Bedside shift change report given to Mikel Koch RN (oncoming nurse) by Kamari Lane RN (offgoing nurse). Report included the following information SBAR, Kardex, Intake/Output, MAR, and Recent Results    Shift worked:  7P-7A     Shift summary and any significant changes:     PT is alert and orientation. Pain controlled by oxycodone. Pt is voiding well. Concerns for physician to address:       Zone phone for oncoming shift:   [phone]       Activity:  Activity Level: Up with Assistance  Number times ambulated in hallways past shift: 0  Number of times OOB to chair past shift: 0    Cardiac:   Cardiac Monitoring: No      Cardiac Rhythm: Sinus Rhythm    Access:  Current line(s): PIV     Genitourinary:   Urinary status: voiding    Respiratory:   O2 Device: Nasal cannula  Chronic home O2 use?: NO  Incentive spirometer at bedside: YES       GI:  Last Bowel Movement Date: 03/27/23  Current diet:  ADULT DIET Regular  Passing flatus: YES  Tolerating current diet: YES       Pain Management:   Patient states pain is manageable on current regimen: YES    Skin:  Bruno Score: 19  Interventions: float heels, increase time out of bed, and PT/OT consult    Patient Safety:  Fall Score:  Total Score: 3  Interventions: bed/chair alarm, assistive device (walker, cane, etc), gripper socks, pt to call before getting OOB, and stay with me (per policy)  High Fall Risk: Yes    Length of Stay:  Expected LOS: - - -  Actual LOS: 1      Kameron Stewart RN

## 2023-03-30 VITALS
SYSTOLIC BLOOD PRESSURE: 111 MMHG | HEIGHT: 62 IN | TEMPERATURE: 98.8 F | DIASTOLIC BLOOD PRESSURE: 70 MMHG | RESPIRATION RATE: 16 BRPM | WEIGHT: 179.01 LBS | OXYGEN SATURATION: 90 % | BODY MASS INDEX: 32.94 KG/M2 | HEART RATE: 79 BPM

## 2023-03-30 LAB — HGB BLD-MCNC: 10.7 G/DL (ref 11.5–16)

## 2023-03-30 PROCEDURE — 74011000250 HC RX REV CODE- 250: Performed by: ORTHOPAEDIC SURGERY

## 2023-03-30 PROCEDURE — 36415 COLL VENOUS BLD VENIPUNCTURE: CPT

## 2023-03-30 PROCEDURE — 97116 GAIT TRAINING THERAPY: CPT

## 2023-03-30 PROCEDURE — 94760 N-INVAS EAR/PLS OXIMETRY 1: CPT

## 2023-03-30 PROCEDURE — 74011250637 HC RX REV CODE- 250/637: Performed by: ORTHOPAEDIC SURGERY

## 2023-03-30 PROCEDURE — 77010033678 HC OXYGEN DAILY

## 2023-03-30 PROCEDURE — 97530 THERAPEUTIC ACTIVITIES: CPT

## 2023-03-30 PROCEDURE — 85018 HEMOGLOBIN: CPT

## 2023-03-30 RX ORDER — AMLODIPINE BESYLATE 10 MG/1
10 TABLET ORAL
Qty: 1 TABLET | Refills: 0 | Status: SHIPPED
Start: 2023-03-30

## 2023-03-30 RX ORDER — LOSARTAN POTASSIUM 100 MG/1
100 TABLET ORAL
Qty: 1 TABLET | Refills: 0 | Status: SHIPPED
Start: 2023-03-30

## 2023-03-30 RX ADMIN — ASPIRIN 81 MG: 81 TABLET, CHEWABLE ORAL at 08:37

## 2023-03-30 RX ADMIN — DOCUSATE SODIUM 50MG AND SENNOSIDES 8.6MG 1 TABLET: 8.6; 5 TABLET, FILM COATED ORAL at 08:37

## 2023-03-30 RX ADMIN — FERROUS SULFATE TAB 325 MG (65 MG ELEMENTAL FE) 325 MG: 325 (65 FE) TAB at 08:37

## 2023-03-30 RX ADMIN — LEVOTHYROXINE SODIUM 75 MCG: 0.07 TABLET ORAL at 06:34

## 2023-03-30 RX ADMIN — GABAPENTIN 600 MG: 300 CAPSULE ORAL at 08:37

## 2023-03-30 RX ADMIN — ACETAMINOPHEN 1000 MG: 500 TABLET ORAL at 06:29

## 2023-03-30 RX ADMIN — VENLAFAXINE HYDROCHLORIDE 150 MG: 150 CAPSULE, EXTENDED RELEASE ORAL at 08:37

## 2023-03-30 RX ADMIN — ACETAMINOPHEN 1000 MG: 500 TABLET ORAL at 11:12

## 2023-03-30 RX ADMIN — SODIUM CHLORIDE, PRESERVATIVE FREE 10 ML: 5 INJECTION INTRAVENOUS at 06:29

## 2023-03-30 RX ADMIN — CARVEDILOL 12.5 MG: 12.5 TABLET, FILM COATED ORAL at 08:37

## 2023-03-30 RX ADMIN — THERA TABS 1 TABLET: TAB at 08:37

## 2023-03-30 RX ADMIN — FAMOTIDINE 20 MG: 20 TABLET, FILM COATED ORAL at 08:37

## 2023-03-30 RX ADMIN — POLYETHYLENE GLYCOL 3350 17 G: 17 POWDER, FOR SOLUTION ORAL at 08:37

## 2023-03-30 RX ADMIN — POTASSIUM CHLORIDE 10 MEQ: 750 TABLET, FILM COATED, EXTENDED RELEASE ORAL at 08:37

## 2023-03-30 RX ADMIN — Medication 2000 UNITS: at 08:37

## 2023-03-30 NOTE — PROGRESS NOTES
Problem: Falls - Risk of  Goal: *Absence of Falls  Description: Document Danell Holter Fall Risk and appropriate interventions in the flowsheet.   Outcome: Progressing Towards Goal  Note: Fall Risk Interventions:  Mobility Interventions: Bed/chair exit alarm, Patient to call before getting OOB    Mentation Interventions: Bed/chair exit alarm    Medication Interventions: Bed/chair exit alarm, Assess postural VS orthostatic hypotension, Patient to call before getting OOB    Elimination Interventions: Bed/chair exit alarm, Call light in reach, Patient to call for help with toileting needs              Problem: Simple Spine Procedure:  Post Op Day 1/Day of Discharge  Goal: Off Pathway (Use only if patient is Off Pathway)  Outcome: Progressing Towards Goal  Goal: Activity/Safety  Outcome: Progressing Towards Goal  Goal: Nutrition/Diet  Outcome: Progressing Towards Goal  Goal: Discharge Planning  Outcome: Progressing Towards Goal  Goal: Medications  Outcome: Progressing Towards Goal  Goal: Respiratory  Outcome: Progressing Towards Goal  Goal: Treatments/Interventions/Procedures  Outcome: Progressing Towards Goal  Goal: Psychosocial  Outcome: Progressing Towards Goal     Problem: Pain  Goal: *Control of Pain  Outcome: Progressing Towards Goal

## 2023-03-30 NOTE — PROGRESS NOTES
End of Shift Note    Bedside shift change report given to Manny kelsey RN (oncoming nurse) by Perez Kim RN (offgoing nurse). Report included the following information SBAR, Kardex, Intake/Output, MAR, and Recent Results    Shift worked:  night     Shift summary and any significant changes:     Alert and oriented; voiding; up with  assist via walker; pain management; plan for discharge once vitally stable   Concerns for physician to address:       Zone phone for oncoming shift:          Activity:  Activity Level: Up with Assistance  Number times ambulated in hallways past shift: 1  Number of times OOB to chair past shift: 1    Cardiac:   Cardiac Monitoring: No      Cardiac Rhythm: Sinus Rhythm    Access:  Current line(s): PIV     Genitourinary:   Urinary status: voiding    Respiratory:   O2 Device: None (Room air)  Chronic home O2 use?: NO  Incentive spirometer at bedside: YES       GI:  Last Bowel Movement Date: 03/27/23  Current diet:  ADULT DIET Regular  Passing flatus: YES  Tolerating current diet: YES       Pain Management:   Patient states pain is manageable on current regimen: YES    Skin:  Bruno Score: 20  Interventions: float heels, increase time out of bed, and PT/OT consult    Patient Safety:  Fall Score:  Total Score: 3  Interventions: bed/chair alarm, assistive device (walker, cane, etc), gripper socks, pt to call before getting OOB, and stay with me (per policy)  High Fall Risk: Yes    Length of Stay:  Expected LOS: 2d 19h  Actual LOS: 2      Perez Kim RN

## 2023-03-30 NOTE — PROGRESS NOTES
Problem: Mobility Impaired (Adult and Pediatric)  Goal: *Acute Goals and Plan of Care (Insert Text)  Description: FUNCTIONAL STATUS PRIOR TO ADMISSION: Patient was modified independent using a single point cane for functional mobility. HOME SUPPORT PRIOR TO ADMISSION: The patient lived alone with no local support. Physical Therapy Goals  Initiated 3/28/2023    1. Patient will move from supine to sit and sit to supine , scoot up and down, and roll side to side in bed with modified independence within 4 days. 2. Patient will perform sit to stand with modified independence within 4 days. 3. Patient will ambulate with modified independence for 300 feet with the least restrictive device within 4 days. 4. Patient will ascend/descend 4 stairs with 1 handrail(s) with minimal assistance/contact guard assist within 4 days. 5. Patient will verbalize and demonstrate understanding of spinal precautions (No bending, lifting greater than 5 lbs, or twisting; log-roll technique; frequent repositioning as instructed) within 4 days. Reactivated     Problem: Mobility Impaired (Adult and Pediatric)  Goal: *Acute Goals and Plan of Care (Insert Text)  Description: FUNCTIONAL STATUS PRIOR TO ADMISSION: Patient was modified independent using a single point cane for functional mobility. HOME SUPPORT PRIOR TO ADMISSION: The patient lived alone with no local support. Physical Therapy Goals  Initiated 3/28/2023    1. Patient will move from supine to sit and sit to supine , scoot up and down, and roll side to side in bed with modified independence within 4 days. 2. Patient will perform sit to stand with modified independence within 4 days. 3. Patient will ambulate with modified independence for 300 feet with the least restrictive device within 4 days. 4. Patient will ascend/descend 4 stairs with 1 handrail(s) with minimal assistance/contact guard assist within 4 days.   5. Patient will verbalize and demonstrate understanding of spinal precautions (No bending, lifting greater than 5 lbs, or twisting; log-roll technique; frequent repositioning as instructed) within 4 days. 3/30/2023 1315 by Cheryl Goddard PTA  Outcome: Resolved/Met  3/30/2023 1315 by Cheryl Goddard PTA  Reactivated   PHYSICAL THERAPY TREATMENT  Patient: Praful Will (26 y.o. female)  Date: 3/30/2023  Diagnosis: Spinal stenosis [M48.00] <principal problem not specified>  Procedure(s) (LRB):  L5-S1 POSTERIOR DECOMPRESSION AND FUSION  (N/A) 2 Days Post-Op  Precautions: Spinal No bending, no lifting greater than 5 lbs, no twisting, log-roll technique, repositioning every 20-30 min except when sleeping, brace when OOB (if ordered)  Chart, physical therapy assessment, plan of care and goals were reviewed. ASSESSMENT  Patient continues with skilled PT services and is progressing towards goals. Pt presents with decreased strength. Pts BP stable with mobility. Pt performed bed mobility at supervision. Pt performed sit to stand transfer at SBA/Supervision. Pt ambulated 400ft with RW at [facility]/SBA. Pt ascended/descended 4 steps with both railings at [facility] 62. Pt performed a car transfer at [facility] 54. Pt moving well with  no safety concerns. From physical therapy stand point pt cleared for discharge. Current Level of Function Impacting Discharge (mobility/balance): mobility at [facility]/SBA    Other factors to consider for discharge: decreased strength, decreased endurance          PLAN :  Patient continues to benefit from skilled intervention to address the above impairments. Continue treatment per established plan of care. to address goals. Recommendation for discharge: (in order for the patient to meet his/her long term goals)  No skilled physical therapy/ follow up rehabilitation needs identified at this time.     This discharge recommendation:  Has been made in collaboration with the attending provider and/or case management    IF patient discharges home will need the following DME: rolling walker       SUBJECTIVE:   Patient stated  I'm ready to go home.     OBJECTIVE DATA SUMMARY:   Critical Behavior:  Neurologic State: Alert  Orientation Level: Oriented X4  Cognition: Appropriate decision making       Spinal diagnosis intervention:  The patient stated 3/3 back precautions when prompted. Reviewed all 3 back precautions, log roll technique, and sitting for 30 minutes at a time. Functional Mobility Training:    Bed Mobility:  Log Rolling: Supervision  Supine to Sit: Supervision     Scooting: Supervision        Transfers:  Sit to Stand: Stand-by assistance  Stand to Sit: Supervision                             Balance:  Sitting: Intact  Standing: Intact; With support  Standing - Static: Good;Constant support  Standing - Dynamic : Fair;Good;Constant support  Ambulation/Gait Training:  Distance (ft): 400 Feet (ft)  Assistive Device: Gait belt;Walker, rolling  Ambulation - Level of Assistance: Contact guard assistance;Stand-by assistance        Gait Abnormalities: Decreased step clearance        Base of Support: Narrowed     Speed/Елена: Pace decreased (<100 feet/min)  Step Length: Right shortened;Left shortened     Stairs:  Number of Stairs Trained: 4  Stairs - Level of Assistance: Stand-by assistance   Rail Use: Both  Pain Rating:  Pt with no complaints of pain     Activity Tolerance:   WNL and Good    After treatment patient left in no apparent distress:   Sitting in chair and Call bell within reach    COMMUNICATION/COLLABORATION:   The patients plan of care was discussed with: Registered nurse.      Thalia Quevedo PTA   Time Calculation: 24 mins

## 2023-03-30 NOTE — PROGRESS NOTES
ORTHO POST OP SPINE PROGRESS NOTE    2023  Admit Date: 3/28/2023  Admit Diagnosis: Spinal stenosis [M48.00]  Procedure: Procedure(s):  L5-S1 POSTERIOR DECOMPRESSION AND FUSION   Post Op day: 2 Days Post-Op    Subjective:     Chance Chow is a patient who has complaints of pain in the low back. s/p L5-S1 posterior decompression and fusion. LLE pain improved. .   Tolerating po  + voiding  Held last night d/t hypotension. Pt states she feels good this morning and is eager to go home. Review of Systems: Pertinent items are noted in HPI. Objective:     PT/OT:   Distance Ambulated:           Time Ambulated (min):        Ambulation Response: Activity Response: Fairly tolerated  Assistive Device:              Assistive Device: Fall prevention device    Vital Signs:    Blood pressure (!) 98/54, pulse 72, temperature 98.8 °F (37.1 °C), resp. rate 16, height 5' 1.5\" (1.562 m), weight 81.2 kg (179 lb 0.2 oz), SpO2 90 %. Temp (24hrs), Av.4 °F (36.9 °C), Min:98.1 °F (36.7 °C), Max:98.8 °F (37.1 °C)      No intake/output data recorded.  1901 -  0700  In: -   Out: 200 [Urine:200]    LAB:    Recent Labs     23  0308   HGB 10.7*       Wound/Drain Assessment:  Drain:      Dressing:     Physical Exam:  Neurological: no deficit  Incision clean, dry, and intact and mild bruising. WOOD with min drainage. Changed to optifoam this morning    BLE    Assessment:      Patient Active Problem List   Diagnosis Code    Knee arthropathy M17.10    Spinal stenosis M48.00       Plan:     Continue PT/OT/Rehab  Discontinue: IV  Consult: PT  and OT    Discharge To:  home. Likely today  S/p L5-S1 PSF - LLE pain improved  Pain control - sched tylenol, prn oxy  PT/OT - LSO, wbat  Soft BP - hypotensive this morning. Asymptomatic. Meds held.  Will see how she does today  D/c plan - home when stable

## 2023-03-30 NOTE — PROGRESS NOTES
PT  to d/c home today with Daughter. Cleared from CM standpoint. No CM needs at this time.       [address]  Phone: [phone]

## 2023-03-30 NOTE — DISCHARGE SUMMARY
Spine Discharge Summary    Patient ID:  Nitin Ruvalcaba  [id number]  female  68 y.o.  1950    Admit date: 3/28/2023    Discharge date: 3/30/2023    Admitting Physician: Tsering Torres MD     Consulting Physician(s):   Treatment Team: Care Manager: Peyton Lemos; Utilization Review: Juan Bruno RN; Physical Therapy Assistant: Theron Carter PTA    Date of Surgery:   3/28/2023     Preoperative Diagnosis:  LUMBAR PAIN/SPONDYLOSIS/DEGENERATIVE DISC DISEASE/ SPINAL STENOSIS WITH NEUROGENIC CLAUDICATION/ RADICULOPATHY/SCIATICA/DEGENERATIVE SCOLIOSIS/ FORAMINAL STENOSIS    Postoperative Diagnosis:   LUMBAR PAIN/SPONDYLOSIS/DEGENERATIVE DISC DISEASE/ SPINAL STENOSIS WITH NEUROGENIC CLAUDICATION/ RADICULOPATHY/SCIATICA/DEGENERATIVE SCOLIOSIS/ FORAMINAL STENOSIS    Procedure(s):  L5-S1 POSTERIOR DECOMPRESSION AND FUSION      Anesthesia Type:   General     Surgeon: Hilaria Webster MD                            HPI:  Pt is a 68 y.o. female who has a history of LUMBAR PAIN/SPONDYLOSIS/DEGENERATIVE DISC DISEASE/ SPINAL STENOSIS WITH NEUROGENIC CLAUDICATION/ RADICULOPATHY/SCIATICA/DEGENERATIVE SCOLIOSIS/ FORAMINAL STENOSIS  with pain and limitations of activities of daily living who presents at this time for a L5-S1 POSTERIOR DECOMPRESSION AND FUSION   following the failure of conservative management. PMH:   Past Medical History:   Diagnosis Date    Acid reflux     Adverse effect of anesthesia     slow to wake after surgery    Arrhythmia     MVP    Arthritis     Autoimmune disease (HCC)     lichen planus    CAD (coronary artery disease)     MVP    Fibromyalgia     GERD (gastroesophageal reflux disease)     Hypertension     Menopause     Nausea & vomiting     Pancreatitis     Plantar fasciitis     Thyroid disease     TMJ (dislocation of temporomandibular joint)        Body mass index is 33.28 kg/m². : A BMI > 30 is classified as obesity and > 40 is classified as morbid obesity.      Medications upon admission :   Prior to Admission Medications   Prescriptions Last Dose Informant Patient Reported? Taking? amLODIPine (NORVASC) 10 mg tablet 3/28/2023 at 0630  No No   Sig: Take 1 Tab by mouth every morning. DO NOT TAKE FOR BLOOD PRESSURE LESS THAN 130/80   aspirin 81 mg chewable tablet 3/21/2023  Yes Yes   Sig: Take 81 mg by mouth daily. carvediloL (COREG) 12.5 mg tablet 3/28/2023 at 0630  Yes Yes   Sig: Take 12.5 mg by mouth two (2) times daily (with meals). cholecalciferol (VITAMIN D3) (2,000 UNITS /50 MCG) cap capsule 3/28/2023 at 0630  Yes Yes   Sig: Take 2,000 Units by mouth daily. ferrous sulfate (IRON PO) 3/27/2023  Yes Yes   Sig: Take 65 mg by mouth daily. furosemide (LASIX) 40 mg tablet 3/28/2023  Yes No   Sig: Take 40 mg by mouth every morning.   gabapentin (NEURONTIN) 600 mg tablet 3/28/2023 at 0630  Yes Yes   Sig: Take 600 mg by mouth three (3) times daily. Indications: 1200 mg in AM; 1800 mg in PM   levothyroxine (SYNTHROID) 75 mcg tablet 3/28/2023 at 0630  Yes Yes   Sig: Take 75 mcg by mouth Daily (before breakfast). losartan (COZAAR) 100 mg tablet 3/28/2023 at 0630  No No   Sig: Take 1 Tab by mouth every morning. DO NOT TAKE FOR BLOOD PRESSURE LESS THAN 130/80   melatonin 10 mg tab 3/27/2023  Yes Yes   Sig: Take  by mouth nightly. meloxicam (MOBIC) 15 mg tablet 3/21/2023  Yes No   Sig: Take 15 mg by mouth daily. multivitamin (ONE A DAY) tablet 3/21/2023  Yes Yes   Sig: Take 0.5 tablets by mouth two (2) times a day. potassium chloride SA (MICRO-K) 10 mEq capsule 3/27/2023  Yes Yes   Sig: Take 10 mEq by mouth daily (with dinner). pravastatin (PRAVACHOL) 20 mg tablet 3/27/2023  Yes Yes   Sig: Take 20 mg by mouth nightly. venlafaxine-SR (EFFEXOR-XR) 150 mg capsule 3/28/2023 at 0630  Yes Yes   Sig: Take 150 mg by mouth two (2) times a day. 150 in the morning, 75 at night time      Facility-Administered Medications: None        Allergies:     Allergies   Allergen Reactions    Codeine Other (comments)     Makes her foggy in the head    Erythromycin Nausea and Vomiting    Sulfa (Sulfonamide Antibiotics) Rash        Hospital Course: The patient underwent surgery. Complications:  None; patient tolerated the procedure well. Was taken to the PACU in stable condition and then transferred to the ortho floor. POD#1 she was moving well, able to clear therapy however BP labile/soft, asymptomatic and felt very exhausted from therapy sessions. She remained overnight to allow her body time/rest and monitor BP. Her BP remained soft on POD#2 however still asymptomatic with therapy. Instructed on holding BP meds, checking with her auto cuff at home and resuming with parameters. Perioperative Antibiotics:  Ancef     Postoperative Pain Management:  Oxycodone & Tylenol     Postoperative transfusions:    Number of units banked PRBCs =   none     Post Op complications: none    Hemoglobin at discharge:    Lab Results   Component Value Date/Time    HGB 10.7 (L) 03/30/2023 03:08 AM    INR 1.0 03/21/2023 01:11 PM       WOOD dressing changed to optifoam for discharge - clean, dry and intact. No significant erythema or swelling. Wound appears to be healing without any evidence of infection. Neurovascular exam found to be within normal limits. Physical Therapy started following surgery and participated in bed mobility, transfers and ambulation. Gait:  Gait  Base of Support: Narrowed  Speed/Елена: Pace decreased (<100 feet/min)  Step Length: Right shortened, Left shortened  Gait Abnormalities: Decreased step clearance  Ambulation - Level of Assistance: Contact guard assistance  Distance (ft): 400 Feet (ft)  Assistive Device: Gait belt, Walker, rolling  Rail Use: Both  Stairs - Level of Assistance: Stand-by assistance  Number of Stairs Trained: 4                   Discharged to: Home.     Condition on Discharge:   stable    Discharge instructions:  - Take pain medications as prescribed  - Resume pre hospital diet      - Discharge activity: activity as tolerated  - Ambulate as tolerated  - Avoid bending, lifting and twisting  - Lumbar brace when oob  - Wound Care Keep wound clean and dry. See discharge instruction sheet. -DISCHARGE MEDICATION LIST     Discharge Medication List as of 3/30/2023 11:48 AM        START taking these medications    Details   acetaminophen (TYLENOL) 500 mg tablet Take 2 Tablets by mouth every six (6) hours for 7 days. , Normal, Disp-56 Tablet, R-0      oxyCODONE IR (ROXICODONE) 5 mg immediate release tablet Take 1-2 Tablets by mouth every six (6) hours as needed for Pain for up to 7 days. Max Daily Amount: 40 mg. One tab for mild to moderate pain level 1-6, or 2 tabs for severe pain level 7-10, Normal, Disp-30 Tablet, R-0      polyethylene glycol (MIRALAX) 17 gram packet Take 1 Packet by mouth daily for 7 days. , Normal, Disp-7 Packet, R-0      senna-docusate (PERICOLACE) 8.6-50 mg per tablet Take 1 Tablet by mouth two (2) times a day for 7 days. , Normal, Disp-14 Tablet, R-0           CONTINUE these medications which have CHANGED    Details   amLODIPine (NORVASC) 10 mg tablet Take 1 Tablet by mouth every morning. DO NOT TAKE FOR BLOOD PRESSURE LESS THAN 130/80., No Print, Disp-1 Tablet, R-0      losartan (COZAAR) 100 mg tablet Take 1 Tablet by mouth every morning. DO NOT TAKE FOR BLOOD PRESSURE LESS THAN 130/80., No Print, Disp-1 Tablet, R-0      furosemide (LASIX) 40 mg tablet Take 1 Tablet by mouth every morning for 30 days. DO NOT TAKE IF BLOOD PRESSURE LESS THAN 130/80, No Print, Disp-30 Tablet, R-0           CONTINUE these medications which have NOT CHANGED    Details   cholecalciferol (VITAMIN D3) (2,000 UNITS /50 MCG) cap capsule Take 2,000 Units by mouth daily. , Historical Med      pravastatin (PRAVACHOL) 20 mg tablet Take 20 mg by mouth nightly., Historical Med      carvediloL (COREG) 12.5 mg tablet Take 12.5 mg by mouth two (2) times daily (with meals). , Historical Med aspirin 81 mg chewable tablet Take 81 mg by mouth daily. , Historical Med      ferrous sulfate (IRON PO) Take 65 mg by mouth daily. , Historical Med      melatonin 10 mg tab Take  by mouth nightly., Historical Med      multivitamin (ONE A DAY) tablet Take 0.5 tablets by mouth two (2) times a day., Historical Med      venlafaxine-SR (EFFEXOR-XR) 150 mg capsule Take 150 mg by mouth two (2) times a day. 150 in the morning, 75 at night time, Historical Med      gabapentin (NEURONTIN) 600 mg tablet Take 600 mg by mouth three (3) times daily. Indications: 1200 mg in AM; 1800 mg in PM, Historical Med      levothyroxine (SYNTHROID) 75 mcg tablet Take 75 mcg by mouth Daily (before breakfast). , Historical Med      potassium chloride SA (MICRO-K) 10 mEq capsule Take 10 mEq by mouth daily (with dinner). , Historical Med           STOP taking these medications       meloxicam (MOBIC) 15 mg tablet Comments:   Reason for Stopping:            per medical continuation form      -Follow up in office in 2 weeks      Signed:  Garfield Zapata NP  Orthopaedic Nurse Practitioner    3/30/2023  1:19 PM oriented to person, place, time and situation

## 2023-03-30 NOTE — DISCHARGE INSTRUCTIONS
Lara Corley    Discharge Instruction Sheet: POSTERIOR SPINAL FUSION    Pain control:   Typically, we will prescribe a narcotic usually 1-2 tabs every four hours is    sufficient for the pain. Most patients need this only for the first few weeks. You   should discontinue this as the pain decreases. You should not drive while taking any narcotic pain medications. Constipation:  Pain medicines and anesthesia can be constipating-this can be prevented by gentle physical activity and drinking plenty of fluid. It should be treated with over-the-counter medications such as Miralax or suppositories, and/or Fleets enema. You should have a bowel movement at least every other day following surgery. Incision care:  Keep this area clean and dry. Your dressing is designed to stay in place for 5-7 days. You will be sent home with one additional dressing to change at that time. Leave this new dressing in place until our follow up visit in the office in about 10-14 days. If staples are in place, they should be removed about 14-20 days after surgery. You may shower with this impermeable dressing in place. DO NOT take a tub bath or go swimming until cleared by your doctor. DO NOT apply lotions, oils, or creams to incision. To increase and promote healing:  Stop Smoking (or at least cut back on smoking). Eat a well-balanced diet (high in protein and vitamin C)  If your appetite is poor, consider nutritional supplements like Ensure, Glucerna, or [place] Instant Breakfast.  If you are diabetic, controlling you blood sugars is very important to prevent infection and promote wound healing. Nutrition:  If you were on a supplement such as Ensure or Glucerna) while in the hospital, please continue using them with each meal for the next 30 days.   Eat a well-balanced diet - High in protein, high in vitamins and minerals, especially vitamin C and zinc.     Restrictions:   Remember your \"BLT's\"    1. Limited bending at waist    2. Lift no more than 10 pounds    3. No Twisting     If you were given a brace, wear it when out of bed. Warning signs: Please call your physician immediately at [phone] if you have  Bleeding from incision that is constant. Change in mental status (unusual behavior or confusion)  If your incision develops redness or swelling  Change in wound drainage (increase in amount, color, or foul odor)  [place] over 101.5 degrees Fahrenheit   Headache that is not relieved with pain medication  Tenderness or redness in the calf of your leg    Emergency: CALL 911 if you have  Shortness of breath  Chest pain  Localized chest pain when coughing or taking a deep breath    Follow-up:  Please call Dr. Foster Chandler office for a follow up appointment in 2-3 weeks at [phone]. You can return to work when cleared by a physician. During normal business hours you may reach Dr. Unique Jordan' team directly at [phone] if you have concerns or questions.     Ranjan Rivers

## 2023-03-30 NOTE — PROGRESS NOTES
Problem: Falls - Risk of  Goal: *Absence of Falls  Description: Document Melva Chapa Fall Risk and appropriate interventions in the flowsheet.   Outcome: Progressing Towards Goal  Note: Fall Risk Interventions:  Mobility Interventions: Bed/chair exit alarm, Patient to call before getting OOB    Mentation Interventions: Bed/chair exit alarm    Medication Interventions: Assess postural VS orthostatic hypotension, Bed/chair exit alarm, Patient to call before getting OOB    Elimination Interventions: Bed/chair exit alarm, Call light in reach, Patient to call for help with toileting needs              Problem: Patient Education: Go to Patient Education Activity  Goal: Patient/Family Education  Outcome: Progressing Towards Goal     Problem: Patient Education: Go to Patient Education Activity  Goal: Patient/Family Education  Outcome: Progressing Towards Goal     Problem: Patient Education: Go to Patient Education Activity  Goal: Patient/Family Education  Outcome: Progressing Towards Goal     Problem: Simple Spine Procedure:  Day of Surgery  Goal: Off Pathway (Use only if patient is Off Pathway)  Outcome: Progressing Towards Goal  Goal: Activity/Safety  Outcome: Progressing Towards Goal  Goal: Consults, if ordered  Outcome: Progressing Towards Goal  Goal: Nutrition/Diet  Outcome: Progressing Towards Goal  Goal: Discharge Planning  Outcome: Progressing Towards Goal  Goal: Medications  Outcome: Progressing Towards Goal  Goal: Respiratory  Outcome: Progressing Towards Goal  Goal: Treatments/Interventions/Procedures  Outcome: Progressing Towards Goal  Goal: Psychosocial  Outcome: Progressing Towards Goal  Goal: *Verbalizes understanding of type and use of pain medication  Outcome: Progressing Towards Goal  Goal: *Optimal pain control at patient's stated goal  Outcome: Progressing Towards Goal  Goal: *Verbalizes/demonstrates understanding of proper body mechanics and use of stabilization device if ordered  Outcome: Progressing Towards Goal  Goal: *Activity level attained as ordered  Outcome: Progressing Towards Goal  Goal: *Active bowel sounds  Outcome: Progressing Towards Goal  Goal: *Respiratory status stable  Outcome: Progressing Towards Goal  Goal: *Adequate urinary output  Outcome: Progressing Towards Goal  Goal: *Hemodynamically stable  Outcome: Progressing Towards Goal     Problem: Simple Spine Procedure:  Post Op Day 1/Day of Discharge  Goal: Off Pathway (Use only if patient is Off Pathway)  Outcome: Progressing Towards Goal  Goal: Activity/Safety  Outcome: Progressing Towards Goal  Goal: Nutrition/Diet  Outcome: Progressing Towards Goal  Goal: Discharge Planning  Outcome: Progressing Towards Goal  Goal: Medications  Outcome: Progressing Towards Goal  Goal: Respiratory  Outcome: Progressing Towards Goal  Goal: Treatments/Interventions/Procedures  Outcome: Progressing Towards Goal  Goal: Psychosocial  Outcome: Progressing Towards Goal     Problem: Simple Spine Procedure: Discharge Outcomes  Goal: *Optimal pain control at patient's stated goal  Outcome: Progressing Towards Goal  Goal: *Demonstrates ability to place and remove stabilization device  Outcome: Progressing Towards Goal  Goal: *Progress independence mobility/activities (eg: Mobility precautions)  Outcome: Progressing Towards Goal  Goal: *Resumes normal function of bladder and bowel  Outcome: Progressing Towards Goal  Goal: *Lungs clear or at baseline  Outcome: Progressing Towards Goal  Goal: *Verbalizes name, dosage, time, side effects, and number of days to continue medications  Outcome: Progressing Towards Goal  Goal: *Modified independence with transfers, ambulation on levels with assistance devices, stair climbing, ADL's  Outcome: Progressing Towards Goal  Goal: *Describes follow-up/return visits to physicians  Outcome: Progressing Towards Goal  Goal: *Describes available resources and support systems  Outcome: Progressing Towards Goal  Goal: *Labs within defined limits  Outcome: Progressing Towards Goal  Goal: *Tolerating diet  Outcome: Progressing Towards Goal     Problem: Pain  Goal: *Control of Pain  Outcome: Progressing Towards Goal

## 2023-03-31 ENCOUNTER — HOSPITAL ENCOUNTER (EMERGENCY)
Age: 73
Discharge: HOME | End: 2023-03-31
Attending: EMERGENCY MEDICINE
Payer: MEDICARE

## 2023-03-31 ENCOUNTER — APPOINTMENT (OUTPATIENT)
Dept: CT IMAGING | Age: 73
End: 2023-03-31
Attending: EMERGENCY MEDICINE
Payer: MEDICARE

## 2023-03-31 VITALS
OXYGEN SATURATION: 100 % | BODY MASS INDEX: 32.94 KG/M2 | HEART RATE: 75 BPM | WEIGHT: 179.01 LBS | SYSTOLIC BLOOD PRESSURE: 112 MMHG | HEIGHT: 62 IN | RESPIRATION RATE: 12 BRPM | TEMPERATURE: 97.7 F | DIASTOLIC BLOOD PRESSURE: 65 MMHG

## 2023-03-31 DIAGNOSIS — R10.84 ABDOMINAL PAIN, GENERALIZED: Primary | ICD-10-CM

## 2023-03-31 DIAGNOSIS — K59.03 DRUG-INDUCED CONSTIPATION: ICD-10-CM

## 2023-03-31 LAB
ALBUMIN SERPL-MCNC: 3 G/DL (ref 3.5–5)
ALBUMIN/GLOB SERPL: 0.8 (ref 1.1–2.2)
ALP SERPL-CCNC: 80 U/L (ref 45–117)
ALT SERPL-CCNC: 15 U/L (ref 12–78)
ANION GAP SERPL CALC-SCNC: 4 MMOL/L (ref 5–15)
APPEARANCE UR: CLEAR
AST SERPL-CCNC: 17 U/L (ref 15–37)
ATRIAL RATE: 83 BPM
BACTERIA URNS QL MICRO: NEGATIVE /HPF
BASOPHILS # BLD: 0 K/UL (ref 0–0.1)
BASOPHILS NFR BLD: 0 % (ref 0–1)
BILIRUB SERPL-MCNC: 0.7 MG/DL (ref 0.2–1)
BILIRUB UR QL: NEGATIVE
BUN SERPL-MCNC: 7 MG/DL (ref 6–20)
BUN/CREAT SERPL: 12 (ref 12–20)
CALCIUM SERPL-MCNC: 9 MG/DL (ref 8.5–10.1)
CALCULATED P AXIS, ECG09: 51 DEGREES
CALCULATED R AXIS, ECG10: -5 DEGREES
CALCULATED T AXIS, ECG11: 23 DEGREES
CHLORIDE SERPL-SCNC: 104 MMOL/L (ref 97–108)
CO2 SERPL-SCNC: 28 MMOL/L (ref 21–32)
COLOR UR: ABNORMAL
CREAT SERPL-MCNC: 0.57 MG/DL (ref 0.55–1.02)
DIAGNOSIS, 93000: NORMAL
DIFFERENTIAL METHOD BLD: ABNORMAL
EOSINOPHIL # BLD: 0.1 K/UL (ref 0–0.4)
EOSINOPHIL NFR BLD: 1 % (ref 0–7)
EPITH CASTS URNS QL MICRO: ABNORMAL /LPF
ERYTHROCYTE [DISTWIDTH] IN BLOOD BY AUTOMATED COUNT: 13.2 % (ref 11.5–14.5)
GLOBULIN SER CALC-MCNC: 3.8 G/DL (ref 2–4)
GLUCOSE SERPL-MCNC: 124 MG/DL (ref 65–100)
GLUCOSE UR STRIP.AUTO-MCNC: NEGATIVE MG/DL
HCT VFR BLD AUTO: 37.9 % (ref 35–47)
HGB BLD-MCNC: 12.4 G/DL (ref 11.5–16)
HGB UR QL STRIP: NEGATIVE
HYALINE CASTS URNS QL MICRO: ABNORMAL /LPF (ref 0–2)
IMM GRANULOCYTES # BLD AUTO: 0.1 K/UL (ref 0–0.04)
IMM GRANULOCYTES NFR BLD AUTO: 1 % (ref 0–0.5)
KETONES UR QL STRIP.AUTO: 40 MG/DL
LACTATE BLD-SCNC: 0.91 MMOL/L (ref 0.4–2)
LEUKOCYTE ESTERASE UR QL STRIP.AUTO: NEGATIVE
LIPASE SERPL-CCNC: 30 U/L (ref 73–393)
LYMPHOCYTES # BLD: 0.4 K/UL (ref 0.8–3.5)
LYMPHOCYTES NFR BLD: 6 % (ref 12–49)
MAGNESIUM SERPL-MCNC: 2 MG/DL (ref 1.6–2.4)
MCH RBC QN AUTO: 29.5 PG (ref 26–34)
MCHC RBC AUTO-ENTMCNC: 32.7 G/DL (ref 30–36.5)
MCV RBC AUTO: 90.2 FL (ref 80–99)
MONOCYTES # BLD: 0.4 K/UL (ref 0–1)
MONOCYTES NFR BLD: 5 % (ref 5–13)
NEUTS SEG # BLD: 6.3 K/UL (ref 1.8–8)
NEUTS SEG NFR BLD: 87 % (ref 32–75)
NITRITE UR QL STRIP.AUTO: NEGATIVE
NRBC # BLD: 0 K/UL (ref 0–0.01)
NRBC BLD-RTO: 0 PER 100 WBC
P-R INTERVAL, ECG05: 134 MS
PH UR STRIP: 8.5 (ref 5–8)
PLATELET # BLD AUTO: 184 K/UL (ref 150–400)
PMV BLD AUTO: 10 FL (ref 8.9–12.9)
POTASSIUM SERPL-SCNC: 3.6 MMOL/L (ref 3.5–5.1)
PROT SERPL-MCNC: 6.8 G/DL (ref 6.4–8.2)
PROT UR STRIP-MCNC: ABNORMAL MG/DL
Q-T INTERVAL, ECG07: 370 MS
QRS DURATION, ECG06: 78 MS
QTC CALCULATION (BEZET), ECG08: 434 MS
RBC # BLD AUTO: 4.2 M/UL (ref 3.8–5.2)
RBC #/AREA URNS HPF: ABNORMAL /HPF (ref 0–5)
RBC MORPH BLD: ABNORMAL
SODIUM SERPL-SCNC: 136 MMOL/L (ref 136–145)
SP GR UR REFRACTOMETRY: 1.01 (ref 1–1.03)
TROPONIN I SERPL HS-MCNC: <4 NG/L (ref 0–51)
UA: UC IF INDICATED,UAUC: ABNORMAL
UROBILINOGEN UR QL STRIP.AUTO: 1 EU/DL (ref 0.2–1)
VENTRICULAR RATE, ECG03: 83 BPM
WBC # BLD AUTO: 7.3 K/UL (ref 3.6–11)
WBC URNS QL MICRO: ABNORMAL /HPF (ref 0–4)

## 2023-03-31 PROCEDURE — 83605 ASSAY OF LACTIC ACID: CPT

## 2023-03-31 PROCEDURE — 36415 COLL VENOUS BLD VENIPUNCTURE: CPT

## 2023-03-31 PROCEDURE — 84484 ASSAY OF TROPONIN QUANT: CPT

## 2023-03-31 PROCEDURE — 99285 EMERGENCY DEPT VISIT HI MDM: CPT

## 2023-03-31 PROCEDURE — 83690 ASSAY OF LIPASE: CPT

## 2023-03-31 PROCEDURE — 83735 ASSAY OF MAGNESIUM: CPT

## 2023-03-31 PROCEDURE — 81001 URINALYSIS AUTO W/SCOPE: CPT

## 2023-03-31 PROCEDURE — 74011000636 HC RX REV CODE- 636: Performed by: EMERGENCY MEDICINE

## 2023-03-31 PROCEDURE — 93005 ELECTROCARDIOGRAM TRACING: CPT

## 2023-03-31 PROCEDURE — 80053 COMPREHEN METABOLIC PANEL: CPT

## 2023-03-31 PROCEDURE — 74011250636 HC RX REV CODE- 250/636: Performed by: EMERGENCY MEDICINE

## 2023-03-31 PROCEDURE — 85025 COMPLETE CBC W/AUTO DIFF WBC: CPT

## 2023-03-31 PROCEDURE — 96375 TX/PRO/DX INJ NEW DRUG ADDON: CPT

## 2023-03-31 PROCEDURE — 74177 CT ABD & PELVIS W/CONTRAST: CPT

## 2023-03-31 PROCEDURE — 96374 THER/PROPH/DIAG INJ IV PUSH: CPT

## 2023-03-31 RX ORDER — SODIUM CHLORIDE 0.9 % (FLUSH) 0.9 %
5-40 SYRINGE (ML) INJECTION AS NEEDED
Status: DISCONTINUED | OUTPATIENT
Start: 2023-03-31 | End: 2023-03-31 | Stop reason: HOSPADM

## 2023-03-31 RX ORDER — SODIUM CHLORIDE 0.9 % (FLUSH) 0.9 %
5-40 SYRINGE (ML) INJECTION EVERY 8 HOURS
Status: DISCONTINUED | OUTPATIENT
Start: 2023-03-31 | End: 2023-03-31 | Stop reason: HOSPADM

## 2023-03-31 RX ORDER — ONDANSETRON 2 MG/ML
4 INJECTION INTRAMUSCULAR; INTRAVENOUS
Status: COMPLETED | OUTPATIENT
Start: 2023-03-31 | End: 2023-03-31

## 2023-03-31 RX ORDER — KETOROLAC TROMETHAMINE 30 MG/ML
15 INJECTION, SOLUTION INTRAMUSCULAR; INTRAVENOUS
Status: COMPLETED | OUTPATIENT
Start: 2023-03-31 | End: 2023-03-31

## 2023-03-31 RX ORDER — FENTANYL CITRATE 50 UG/ML
50 INJECTION, SOLUTION INTRAMUSCULAR; INTRAVENOUS
Status: COMPLETED | OUTPATIENT
Start: 2023-03-31 | End: 2023-03-31

## 2023-03-31 RX ADMIN — IOMEPROL INJECTION 100 ML: 714 INJECTION, SOLUTION INTRAVASCULAR at 10:06

## 2023-03-31 RX ADMIN — FENTANYL CITRATE 50 MCG: 50 INJECTION INTRAMUSCULAR; INTRAVENOUS at 09:13

## 2023-03-31 RX ADMIN — KETOROLAC TROMETHAMINE 15 MG: 30 INJECTION, SOLUTION INTRAMUSCULAR; INTRAVENOUS at 11:28

## 2023-03-31 RX ADMIN — ONDANSETRON 4 MG: 2 INJECTION INTRAMUSCULAR; INTRAVENOUS at 09:13

## 2023-03-31 RX ADMIN — SODIUM CHLORIDE 500 ML: 9 INJECTION, SOLUTION INTRAVENOUS at 09:13

## 2023-03-31 NOTE — ED PROVIDER NOTES
[facility]  EMERGENCY DEPARTMENT ENCOUNTER       Pt Name: Jack Smalls  MRN: [id number]  [place] 1950  Date of evaluation: 3/31/2023  Provider: Hudson Gama DO   PCP: Lauren Ng MD  Note Started: 8:49 AM 3/31/23     CHIEF COMPLAINT       Chief Complaint   Patient presents with    Diarrhea     Pt arrives from home cc of dry heaving and frequent bowel movements since this morning. Daughter reports that pt had lower lumbar surgery this Tuesday and was released yesterday. Daughter states that this morning pt woke up dry heaving and has had two bowel movements this morning that daughter describes as loose and green. Pt was told by surgeon to come to ER        HISTORY OF PRESENT ILLNESS: 1 or more elements      History From: patient/daughter, History limited by: none     Jack Smalls is a 68 y.o. female arrives to the emergency department by private vehicle secondary to nausea vomiting 2 loose bowel movements. Patient recently had L5-S1 fusion was discharged from the hospital yesterday. Had been doing fine until this morning woke up with nausea vomiting dry heaving and had 2 loose watery green stools. Family had called the physician this morning and was advised to come to the emergency department for further evaluation. Patient states that she has had urinary frequency as well as urgency. There is been no pain with urination and no gross hematuria. She denies any chest pain or shortness of breath. She denies any cough or cold symptoms. She denies any fever. She states she is currently having low back pain in addition to abdominal pain rated a 7 out of 10, with no alleviating or exacerbating factors. Please See MDM for Additional Details of the HPI/PMH  Nursing Notes were all reviewed and agreed with or any disagreements were addressed in the HPI. REVIEW OF SYSTEMS        Positives and Pertinent negatives as per HPI.     PAST HISTORY     Past Medical History:  Past Medical History:   Diagnosis Date    Acid reflux     Adverse effect of anesthesia     slow to wake after surgery    Arrhythmia     MVP    Arthritis     Autoimmune disease (HCC)     lichen planus    CAD (coronary artery disease)     MVP    Fibromyalgia     GERD (gastroesophageal reflux disease)     Hypertension     Menopause     Nausea & vomiting     Pancreatitis     Plantar fasciitis     Thyroid disease     TMJ (dislocation of temporomandibular joint)        Past Surgical History:  Past Surgical History:   Procedure Laterality Date    COLONOSCOPY,DIAGNOSTIC  10/1/2015         HX APPENDECTOMY      HX CHOLECYSTECTOMY      HX GYN      d & c    HX ORTHOPAEDIC  2/16/15    LEFT TOTAL KNEE ARTHROPLASTY WITH NAVIGATION     HX TONSILLECTOMY      HX UROLOGICAL      suburethral sling    [facility] ARTHROSCOPY TEMPOROMANDIBULAR JOINT SURGICAL      [facility] ARTHRP KNE CONDYLE&PLATU MEDIAL&LAT COMPARTMENTS  2014/June    right       Family History:  Family History   Problem Relation Age of Onset    Delayed Awakening Father     Breast Cancer Mother 58    Breast Cancer Maternal Grandmother        Social History:  Social History     Tobacco Use    Smoking status: Never    Smokeless tobacco: Never   Vaping Use    Vaping Use: Never used   Substance Use Topics    Alcohol use: Yes     Alcohol/week: 2.5 standard drinks     Types: 3 Glasses of wine per week     Comment: 3 glasses week    Drug use: No       Allergies: Allergies   Allergen Reactions    Codeine Other (comments)     Makes her foggy in the head    Erythromycin Nausea and Vomiting    Sulfa (Sulfonamide Antibiotics) Rash       CURRENT MEDICATIONS      Previous Medications    ACETAMINOPHEN (TYLENOL) 500 MG TABLET    Take 2 Tablets by mouth every six (6) hours for 7 days. AMLODIPINE (NORVASC) 10 MG TABLET    Take 1 Tablet by mouth every morning. DO NOT TAKE FOR BLOOD PRESSURE LESS THAN 130/80. ASPIRIN 81 MG CHEWABLE TABLET    Take 81 mg by mouth daily.     CARVEDILOL (COREG) 12.5 MG TABLET    Take 12.5 mg by mouth two (2) times daily (with meals). CHOLECALCIFEROL (VITAMIN D3) (2,000 UNITS /50 MCG) CAP CAPSULE    Take 2,000 Units by mouth daily. FERROUS SULFATE (IRON PO)    Take 65 mg by mouth daily. FUROSEMIDE (LASIX) 40 MG TABLET    Take 1 Tablet by mouth every morning for 30 days. DO NOT TAKE IF BLOOD PRESSURE LESS THAN 130/80    GABAPENTIN (NEURONTIN) 600 MG TABLET    Take 600 mg by mouth three (3) times daily. Indications: 1200 mg in AM; 1800 mg in PM    LEVOTHYROXINE (SYNTHROID) 75 MCG TABLET    Take 75 mcg by mouth Daily (before breakfast). LOSARTAN (COZAAR) 100 MG TABLET    Take 1 Tablet by mouth every morning. DO NOT TAKE FOR BLOOD PRESSURE LESS THAN 130/80. MELATONIN 10 MG TAB    Take  by mouth nightly. MULTIVITAMIN (ONE A DAY) TABLET    Take 0.5 tablets by mouth two (2) times a day. OXYCODONE IR (ROXICODONE) 5 MG IMMEDIATE RELEASE TABLET    Take 1-2 Tablets by mouth every six (6) hours as needed for Pain for up to 7 days. Max Daily Amount: 40 mg. One tab for mild to moderate pain level 1-6, or 2 tabs for severe pain level 7-10    POLYETHYLENE GLYCOL (MIRALAX) 17 GRAM PACKET    Take 1 Packet by mouth daily for 7 days. POTASSIUM CHLORIDE SA (MICRO-K) 10 MEQ CAPSULE    Take 10 mEq by mouth daily (with dinner). PRAVASTATIN (PRAVACHOL) 20 MG TABLET    Take 20 mg by mouth nightly. SENNA-DOCUSATE (PERICOLACE) 8.6-50 MG PER TABLET    Take 1 Tablet by mouth two (2) times a day for 7 days. VENLAFAXINE-SR (EFFEXOR-XR) 150 MG CAPSULE    Take 150 mg by mouth two (2) times a day. 150 in the morning, 75 at night time       SCREENINGS               No data recorded         PHYSICAL EXAM      ED Triage Vitals [03/31/23 0844]   ED Encounter Vitals Group      /83      Pulse (Heart Rate) 81      Resp Rate 18      Temp 97.7 °F (36.5 °C)      Temp src       O2 Sat (%) 100 %      Weight 179 lb 0.2 oz      Height 5' 1.5\"        Physical Exam  Vitals and nursing note reviewed. Constitutional:       General: She is not in acute distress. Appearance: She is well-developed. She is obese. She is not diaphoretic. Comments: Elderly female, appears fatigued       HENT:      Head: Normocephalic and atraumatic. Mouth/Throat:      Mouth: Mucous membranes are dry. Pharynx: No oropharyngeal exudate. Eyes:      General: No scleral icterus. Extraocular Movements: Extraocular movements intact. Conjunctiva/sclera: Conjunctivae normal.      Pupils: Pupils are equal, round, and reactive to light. Neck:      Vascular: No JVD. Trachea: No tracheal deviation. Cardiovascular:      Rate and Rhythm: Normal rate and regular rhythm. Heart sounds: Normal heart sounds. No murmur heard. Pulmonary:      Effort: Pulmonary effort is normal. No respiratory distress. Breath sounds: Normal breath sounds. No stridor. No wheezing or rales. Abdominal:      General: There is no distension. Palpations: Abdomen is soft. Tenderness: There is no abdominal tenderness. There is no guarding or rebound. Musculoskeletal:         General: Normal range of motion. Cervical back: Normal range of motion and neck supple. Comments: Back brace on      Skin:     General: Skin is warm and dry. Capillary Refill: Capillary refill takes less than 2 seconds. Neurological:      Mental Status: She is alert and oriented to person, place, and time. Cranial Nerves: No cranial nerve deficit.       Comments: No gross motor or sensory deficits    Psychiatric:         Mood and Affect: Mood normal.         Behavior: Behavior normal.        DIAGNOSTIC RESULTS   LABS:     Recent Results (from the past 12 hour(s))   EKG, 12 LEAD, INITIAL    Collection Time: 03/31/23  8:53 AM   Result Value Ref Range    Ventricular Rate 83 BPM    Atrial Rate 83 BPM    P-R Interval 134 ms    QRS Duration 78 ms    Q-T Interval 370 ms    QTC Calculation (Bezet) 434 ms    Calculated P Axis 51 degrees    Calculated R Axis -5 degrees    Calculated T Axis 23 degrees    Diagnosis       Sinus rhythm with premature atrial complexes with aberrant conduction  Poor R-wave Progression    Confirmed by Kyler Corona M.D. ([id number]) on 3/31/2023 12:39:13 PM     URINALYSIS W/ REFLEX CULTURE    Collection Time: 03/31/23  9:08 AM    Specimen: Urine   Result Value Ref Range    Color YELLOW/STRAW      Appearance CLEAR CLEAR      Specific gravity 1.010 1.003 - 1.030      pH (UA) 8.5 (H) 5.0 - 8.0      Protein TRACE (A) NEG mg/dL    Glucose Negative NEG mg/dL    Ketone 40 (A) NEG mg/dL    Bilirubin Negative NEG      Blood Negative NEG      Urobilinogen 1.0 0.2 - 1.0 EU/dL    Nitrites Negative NEG      Leukocyte Esterase Negative NEG      UA:UC IF INDICATED CULTURE NOT INDICATED BY UA RESULT CNI      WBC 0-4 0 - 4 /hpf    RBC 0-5 0 - 5 /hpf    Epithelial cells FEW FEW /lpf    Bacteria Negative NEG /hpf    Hyaline cast 0-2 0 - 2 /lpf   CBC WITH AUTOMATED DIFF    Collection Time: 03/31/23  9:08 AM   Result Value Ref Range    WBC 7.3 3.6 - 11.0 K/uL    RBC 4.20 3.80 - 5.20 M/uL    HGB 12.4 11.5 - 16.0 g/dL    HCT 37.9 35.0 - 47.0 %    MCV 90.2 80.0 - 99.0 FL    MCH 29.5 26.0 - 34.0 PG    MCHC 32.7 30.0 - 36.5 g/dL    RDW 13.2 11.5 - 14.5 %    PLATELET 445 715 - 062 K/uL    MPV 10.0 8.9 - 12.9 FL    NRBC 0.0 0  WBC    ABSOLUTE NRBC 0.00 0.00 - 0.01 K/uL    NEUTROPHILS 87 (H) 32 - 75 %    LYMPHOCYTES 6 (L) 12 - 49 %    MONOCYTES 5 5 - 13 %    EOSINOPHILS 1 0 - 7 %    BASOPHILS 0 0 - 1 %    IMMATURE GRANULOCYTES 1 (H) 0.0 - 0.5 %    ABS. NEUTROPHILS 6.3 1.8 - 8.0 K/UL    ABS. LYMPHOCYTES 0.4 (L) 0.8 - 3.5 K/UL    ABS. MONOCYTES 0.4 0.0 - 1.0 K/UL    ABS. EOSINOPHILS 0.1 0.0 - 0.4 K/UL    ABS. BASOPHILS 0.0 0.0 - 0.1 K/UL    ABS. IMM.  GRANS. 0.1 (H) 0.00 - 0.04 K/UL    DF SMEAR SCANNED      RBC COMMENTS NORMOCYTIC, NORMOCHROMIC     MAGNESIUM    Collection Time: 03/31/23  9:08 AM   Result Value Ref Range    Magnesium 2.0 1.6 - 2.4 mg/dL   METABOLIC PANEL, COMPREHENSIVE    Collection Time: 03/31/23  9:08 AM   Result Value Ref Range    Sodium 136 136 - 145 mmol/L    Potassium 3.6 3.5 - 5.1 mmol/L    Chloride 104 97 - 108 mmol/L    CO2 28 21 - 32 mmol/L    Anion gap 4 (L) 5 - 15 mmol/L    Glucose 124 (H) 65 - 100 mg/dL    BUN 7 6 - 20 MG/DL    Creatinine 0.57 0.55 - 1.02 MG/DL    BUN/Creatinine ratio 12 12 - 20      eGFR >60 >60 ml/min/1.73m2    Calcium 9.0 8.5 - 10.1 MG/DL    Bilirubin, total 0.7 0.2 - 1.0 MG/DL    ALT (SGPT) 15 12 - 78 U/L    AST (SGOT) 17 15 - 37 U/L    Alk. phosphatase 80 45 - 117 U/L    Protein, total 6.8 6.4 - 8.2 g/dL    Albumin 3.0 (L) 3.5 - 5.0 g/dL    Globulin 3.8 2.0 - 4.0 g/dL    A-G Ratio 0.8 (L) 1.1 - 2.2     LIPASE    Collection Time: 03/31/23  9:08 AM   Result Value Ref Range    Lipase 30 (L) 73 - 393 U/L   TROPONIN-HIGH SENSITIVITY    Collection Time: 03/31/23  9:08 AM   Result Value Ref Range    Troponin-High Sensitivity <4 0 - 51 ng/L   POC LACTIC ACID    Collection Time: 03/31/23  9:12 AM   Result Value Ref Range    Lactic Acid (POC) 0.91 0.40 - 2.00 mmol/L        EKG: If performed, independent interpretation documented below in the MDM section     RADIOLOGY:  Non-plain film images such as CT, Ultrasound and MRI are read by the radiologist. Plain radiographic images are visualized and preliminarily interpreted by the ED Provider with the findings documented in the MDM section. Interpretation per the Radiologist below, if available at the time of this note:     CT ABD PELV W CONT    Result Date: 3/31/2023  EXAM: CT ABD PELV W CONT INDICATION: n/v, abdominal pain recent back surgery COMPARISON: CT January 2010 CONTRAST: 100 mL of Isovue-370. ORAL CONTRAST: Not given TECHNIQUE: Following the uneventful intravenous administration of contrast, thin axial images were obtained through the abdomen and pelvis. Coronal and sagittal reconstructions were generated.  CT dose reduction was achieved through use of a standardized protocol tailored for this examination and automatic exposure control for dose modulation. FINDINGS: LOWER THORAX: Bibasilar atelectasis. Large hiatal hernia. LIVER: No mass. BILIARY TREE: Status post cholecystectomy. CBD is not dilated. SPLEEN: within normal limits. PANCREAS: Diffuse fatty replacement of the pancreas. ADRENALS: Unremarkable. KIDNEYS: No mass, calculus, or hydronephrosis. STOMACH: Large hiatal hernia SMALL BOWEL: No dilatation or wall thickening. COLON: Redundant sigmoid colon. Diverticulosis of the colon, with no evidence of acute diverticulitis. Moderate constipation pattern APPENDIX: Not visualized PERITONEUM: No ascites or pneumoperitoneum. RETROPERITONEUM: No lymphadenopathy or aortic aneurysm. REPRODUCTIVE ORGANS: Uterus is noted. No pelvic free fluid. Trace free fluid in the pelvis. URINARY BLADDER: Single focus of gas, compatible with recent instrumentation. BONES: Posterior fusion at L5-S1, with hardware appearing intact. Degenerative levoscoliosis of the lumbar spine ABDOMINAL WALL: No mass or hernia. ADDITIONAL COMMENTS: N/A     1. No acute abdominal or pelvic pathology. 2. Moderate generalized constipation. 3. Extensive diverticulosis of the colon. No acute diverticulitis. 4. Mild bibasilar atelectasis. 5. Large hiatal hernia. 6. Postsurgical changes in the lower lumbar spine.        PROCEDURES   Unless otherwise noted below, none  Procedures     CRITICAL CARE TIME   none    EMERGENCY DEPARTMENT COURSE and DIFFERENTIAL DIAGNOSIS/MDM   Vitals:    Vitals:    03/31/23 0844   BP: 138/83   Pulse: 81   Resp: 18   Temp: 97.7 °F (36.5 °C)   SpO2: 100%   Weight: 81.2 kg (179 lb 0.2 oz)   Height: 5' 1.5\" (1.562 m)        Patient was given the following medications:  Medications   sodium chloride 0.9 % bolus infusion 500 mL (has no administration in time range)   sodium chloride (NS) flush 5-40 mL (has no administration in time range)   sodium chloride (NS) flush 5-40 mL (has no administration in time range)       Medical Decision Making  Amount and/or Complexity of Data Reviewed  Labs: ordered. Radiology: ordered. ECG/medicine tests: ordered. Risk  Prescription drug management. Patient followed by Dr. Noland for low back pain with radiculopathy. Patient had recent lumbar spinal fusion L5-S1 on the 28th, discharged from the hospital yesterday. Given patient's age with nausea vomiting dry heaves we will order cardiac labs and obtain an EKG. With the 2 loose stools we will obtain a CT abdomen pelvis as well to rule out any acute intra abdominal pathology. We will order Zofran for her nausea. EKG obtained at 8:53 AM interpreted by me sinus rhythm with PACs rate 83, normal axis/[place]/QRS, no acute ST changes. CT shows large stool burden. Patient's urinalysis unremarkable. Patient given a soapsuds enema with a significant amount of stool output. Discussed with patient making sure that she is taking her MiraLAX once or twice or 3 times daily what ever it takes to keep her bowel movements soft. As well as making sure she is drinking enough water    FINAL IMPRESSION     1. Abdominal pain, generalized    2. Drug-induced constipation          DISPOSITION/PLAN   Mike Jamison's  results have been reviewed with her. She has been counseled regarding her diagnosis, treatment, and plan. She verbally conveys understanding and agreement of the signs, symptoms, diagnosis, treatment and prognosis and additionally agrees to follow up as discussed. She also agrees with the care-plan and conveys that all of her questions have been answered. I have also provided discharge instructions for her that include: educational information regarding their diagnosis and treatment, and list of reasons why they would want to return to the ED prior to their follow-up appointment, should her condition change. CLINICAL IMPRESSION    Discharge Note: The patient is stable for discharge home.  The signs, symptoms, diagnosis, and discharge instructions have been discussed, understanding conveyed, and agreed upon. The patient is to follow up as recommended or return to ER should their symptoms worsen. PATIENT REFERRED TO:  Follow-up Information       Follow up With Specialties Details Why Contact Info    Ashley Grossman MD Internal Medicine Physician  As needed [id number]  [phone]                DISCHARGE MEDICATIONS:  Discharge Medication List as of 3/31/2023  1:25 PM            DISCONTINUED MEDICATIONS:  Current Discharge Medication List          I am the Primary Clinician of Record. Lan Sanders DO (electronically signed)    (Please note that parts of this dictation were completed with voice recognition software. Quite often unanticipated grammatical, syntax, homophones, and other interpretive errors are inadvertently transcribed by the computer software. Please disregards these errors.  Please excuse any errors that have escaped final proofreading.)

## 2023-03-31 NOTE — ED TRIAGE NOTES
Pt arrives from home cc of dry heaving and frequent bowel movements since this morning. Daughter reports that pt had lower lumbar surgery this Tuesday and was released yesterday. Daughter states that this morning pt woke up dry heaving and has had two bowel movements this morning that daughter describes as loose and green. Pt was told by surgeon to come to ER. Pt complaining of 7/10 back pain currently and stated she had abd pain this am. Daughter at bedside.

## 2023-03-31 NOTE — ED NOTES
Pt tolerated enema well. 1500 ml administered. Pt able to move to toilet but had an initial bowel movement while moving.

## 2023-10-24 ENCOUNTER — TRANSCRIBE ORDERS (OUTPATIENT)
Facility: HOSPITAL | Age: 73
End: 2023-10-24

## 2023-10-24 DIAGNOSIS — Z12.31 SCREENING MAMMOGRAM FOR HIGH-RISK PATIENT: Primary | ICD-10-CM

## 2023-11-09 ENCOUNTER — HOSPITAL ENCOUNTER (OUTPATIENT)
Age: 73
Discharge: HOME | End: 2023-11-09
Payer: MEDICARE

## 2023-11-09 VITALS — HEIGHT: 61 IN | BODY MASS INDEX: 34.93 KG/M2 | WEIGHT: 185 LBS

## 2023-11-09 DIAGNOSIS — Z12.31 SCREENING MAMMOGRAM FOR HIGH-RISK PATIENT: ICD-10-CM

## 2023-11-09 PROCEDURE — 77063 BREAST TOMOSYNTHESIS BI: CPT

## 2024-02-10 ENCOUNTER — OFFICE VISIT (OUTPATIENT)
Age: 74
End: 2024-02-10

## 2024-02-10 VITALS
SYSTOLIC BLOOD PRESSURE: 130 MMHG | WEIGHT: 190 LBS | DIASTOLIC BLOOD PRESSURE: 76 MMHG | HEART RATE: 71 BPM | BODY MASS INDEX: 35.87 KG/M2 | OXYGEN SATURATION: 98 % | HEIGHT: 61 IN | TEMPERATURE: 98.1 F | RESPIRATION RATE: 15 BRPM

## 2024-02-10 DIAGNOSIS — J06.9 VIRAL URI WITH COUGH: Primary | ICD-10-CM

## 2024-02-10 LAB
Lab: NORMAL
PERFORMING INSTRUMENT: NORMAL
QC PASS/FAIL: NORMAL
SARS-COV-2, POC: NORMAL
STREP PYOGENES DNA, POC: NEGATIVE
VALID INTERNAL CONTROL, POC: NORMAL

## 2024-02-10 NOTE — PROGRESS NOTES
Malena Avila (:  1950) is a 74 y.o. female,New patient, here for evaluation of the following chief complaint(s):  New Patient and URI (Pt. C/o uri with cough, head congestion, starting Tu night, Wed morning. )      ASSESSMENT/PLAN:  Visit Diagnoses and Associated Orders       Viral URI with cough    -  Primary    AMB POC STREP GO A DIRECT, DNA PROBE [66233 CPT(R)]           ORDERS WITHOUT AN ASSOCIATED DIAGNOSIS    POCT COVID-19, Antigen [OTY803 Custom]                 Symptomatic treatment, fluids, rest      Follow up with PCP in PRN days if symptoms persist or if symptoms worsen.    SUBJECTIVE/OBJECTIVE:    URI       HPI:   74 y.o. female presents with symptoms of Sinus Pain  Patient complains of clear rhinorrhea, congestion, cough, and sore throat. Roof of mouth is burning and burning pain in throat.  Onset of symptoms was 3 days ago. Symptoms have been gradually worsening since that time. She is drinking plenty of fluids.  Past history is significant for  CAD, fibromyalgia, HTN . Patient is non-smoker.  Denies fevers, chills, CP, SOB, wheezing.  Taking cough drops with some relief.  Eating and drinking well.  No known sick contacts.         Vitals:    02/10/24 1455   BP: 130/76   Site: Left Lower Arm   Position: Sitting   Cuff Size: Large Adult   Pulse: 71   Resp: 15   Temp: 98.1 °F (36.7 °C)   TempSrc: Temporal   SpO2: 98%   Weight: 86.2 kg (190 lb)   Height: 1.549 m (5' 1\")         Physical Exam  Constitutional:       General: She is not in acute distress.     Appearance: Normal appearance. She is not ill-appearing or toxic-appearing.   HENT:      Head: Normocephalic and atraumatic.      Right Ear: Tympanic membrane, ear canal and external ear normal.      Left Ear: Tympanic membrane, ear canal and external ear normal.      Nose: Nose normal.      Mouth/Throat:      Mouth: Mucous membranes are moist.      Pharynx: Oropharynx is clear.   Eyes:      Extraocular Movements: Extraocular movements

## 2024-10-22 ENCOUNTER — TRANSCRIBE ORDERS (OUTPATIENT)
Facility: HOSPITAL | Age: 74
End: 2024-10-22

## 2024-10-22 DIAGNOSIS — Z12.31 VISIT FOR SCREENING MAMMOGRAM: Primary | ICD-10-CM

## 2024-11-12 ENCOUNTER — HOSPITAL ENCOUNTER (OUTPATIENT)
Age: 74
Discharge: HOME | End: 2024-11-15
Payer: MEDICARE

## 2024-11-12 VITALS — BODY MASS INDEX: 33.86 KG/M2 | WEIGHT: 184 LBS | HEIGHT: 62 IN

## 2024-11-12 DIAGNOSIS — Z12.31 VISIT FOR SCREENING MAMMOGRAM: ICD-10-CM

## 2024-11-12 PROCEDURE — 77063 BREAST TOMOSYNTHESIS BI: CPT

## 2024-12-30 ENCOUNTER — HOSPITAL ENCOUNTER (OUTPATIENT)
Facility: HOSPITAL | Age: 74
Discharge: HOME | End: 2025-01-02
Attending: INTERNAL MEDICINE
Payer: MEDICARE

## 2024-12-30 DIAGNOSIS — Z83.719 FAMILY HISTORY OF COLONIC POLYPS: ICD-10-CM

## 2024-12-30 DIAGNOSIS — R13.10 PROBLEMS WITH SWALLOWING AND MASTICATION: ICD-10-CM

## 2024-12-30 PROCEDURE — 74220 X-RAY XM ESOPHAGUS 1CNTRST: CPT

## 2025-01-10 RX ORDER — LEVOTHYROXINE SODIUM 75 UG/1
75 TABLET ORAL DAILY
COMMUNITY

## 2025-01-14 ENCOUNTER — HOSPITAL ENCOUNTER (OUTPATIENT)
Facility: HOSPITAL | Age: 75
Setting detail: OUTPATIENT SURGERY
Discharge: HOME | End: 2025-01-14
Attending: INTERNAL MEDICINE | Admitting: INTERNAL MEDICINE
Payer: MEDICARE

## 2025-01-14 ENCOUNTER — ANESTHESIA EVENT (OUTPATIENT)
Facility: HOSPITAL | Age: 75
End: 2025-01-14
Payer: MEDICARE

## 2025-01-14 ENCOUNTER — ANESTHESIA (OUTPATIENT)
Facility: HOSPITAL | Age: 75
End: 2025-01-14
Payer: MEDICARE

## 2025-01-14 VITALS
DIASTOLIC BLOOD PRESSURE: 79 MMHG | HEIGHT: 62 IN | TEMPERATURE: 97.8 F | OXYGEN SATURATION: 93 % | HEART RATE: 70 BPM | BODY MASS INDEX: 33.86 KG/M2 | RESPIRATION RATE: 17 BRPM | WEIGHT: 184 LBS | SYSTOLIC BLOOD PRESSURE: 152 MMHG

## 2025-01-14 PROCEDURE — 3600007502: Performed by: INTERNAL MEDICINE

## 2025-01-14 PROCEDURE — 7100000011 HC PHASE II RECOVERY - ADDTL 15 MIN: Performed by: INTERNAL MEDICINE

## 2025-01-14 PROCEDURE — 3700000000 HC ANESTHESIA ATTENDED CARE: Performed by: INTERNAL MEDICINE

## 2025-01-14 PROCEDURE — 3600007512: Performed by: INTERNAL MEDICINE

## 2025-01-14 PROCEDURE — 88305 TISSUE EXAM BY PATHOLOGIST: CPT

## 2025-01-14 PROCEDURE — 2580000003 HC RX 258: Performed by: INTERNAL MEDICINE

## 2025-01-14 PROCEDURE — 2709999900 HC NON-CHARGEABLE SUPPLY: Performed by: INTERNAL MEDICINE

## 2025-01-14 PROCEDURE — 7100000010 HC PHASE II RECOVERY - FIRST 15 MIN: Performed by: INTERNAL MEDICINE

## 2025-01-14 PROCEDURE — 3700000001 HC ADD 15 MINUTES (ANESTHESIA): Performed by: INTERNAL MEDICINE

## 2025-01-14 PROCEDURE — 6360000002 HC RX W HCPCS: Performed by: NURSE ANESTHETIST, CERTIFIED REGISTERED

## 2025-01-14 RX ORDER — LIDOCAINE HYDROCHLORIDE 20 MG/ML
INJECTION, SOLUTION EPIDURAL; INFILTRATION; INTRACAUDAL; PERINEURAL
Status: DISCONTINUED | OUTPATIENT
Start: 2025-01-14 | End: 2025-01-14 | Stop reason: SDUPTHER

## 2025-01-14 RX ORDER — SODIUM CHLORIDE 9 MG/ML
INJECTION, SOLUTION INTRAVENOUS PRN
Status: DISCONTINUED | OUTPATIENT
Start: 2025-01-14 | End: 2025-01-14 | Stop reason: HOSPADM

## 2025-01-14 RX ORDER — SODIUM CHLORIDE 0.9 % (FLUSH) 0.9 %
5-40 SYRINGE (ML) INJECTION EVERY 12 HOURS SCHEDULED
Status: DISCONTINUED | OUTPATIENT
Start: 2025-01-14 | End: 2025-01-14 | Stop reason: HOSPADM

## 2025-01-14 RX ORDER — SODIUM CHLORIDE 0.9 % (FLUSH) 0.9 %
5-40 SYRINGE (ML) INJECTION PRN
Status: DISCONTINUED | OUTPATIENT
Start: 2025-01-14 | End: 2025-01-14 | Stop reason: HOSPADM

## 2025-01-14 RX ADMIN — PROPOFOL 30 MG: 10 INJECTION, EMULSION INTRAVENOUS at 09:41

## 2025-01-14 RX ADMIN — LIDOCAINE HYDROCHLORIDE 100 MG: 20 INJECTION, SOLUTION EPIDURAL; INFILTRATION; INTRACAUDAL; PERINEURAL at 09:37

## 2025-01-14 RX ADMIN — SODIUM CHLORIDE: 9 INJECTION, SOLUTION INTRAVENOUS at 09:32

## 2025-01-14 RX ADMIN — PROPOFOL 80 MG: 10 INJECTION, EMULSION INTRAVENOUS at 09:37

## 2025-01-14 ASSESSMENT — PAIN - FUNCTIONAL ASSESSMENT: PAIN_FUNCTIONAL_ASSESSMENT: 0-10

## 2025-01-14 ASSESSMENT — PAIN DESCRIPTION - DESCRIPTORS: DESCRIPTORS: ACHING

## 2025-01-14 NOTE — PROGRESS NOTES
ARRIVAL INFORMATION:  Verified patient name and date of birth, scheduled procedure, and informed consent.     : Ana (daughter) contact number: [phone]  Physician and staff can share information with the .     Receive texts: yes    Belongings with patient include:  Clothing,Jewelry (1 ring on patient)    GI FOCUSED ASSESSMENT:  Neuro: Awake, alert, oriented x4  Respiratory: even and unlabored   GI: soft and non-distended  EKG Rhythm: normal sinus rhythm    Education:Reviewed general discharge instructions and  information.  The risks and benefits of the bite block have been explained to patient.  Patient verbalizes understanding.

## 2025-01-14 NOTE — DISCHARGE INSTRUCTIONS
Malena GARCIA [facility]  [id number]  1950    EGD DISCHARGE INSTRUCTIONS  Discomfort:  Sore throat- throat lozenges or warm salt water gargle  redness at IV site- apply warm compress to area; if redness or soreness persist- contact your physician  Gaseous discomfort- walking, belching will help relieve any discomfort  You may not operate a vehicle for 12 hours  You may not engage in an occupation involving machinery or appliances for rest of today  You may not drink alcoholic beverages for at least 12 hours  Avoid making any critical decisions for at least 24 hour  DIET  You may have minimal sips at this time-- do not eat or drink for two hours.  You may eat and drink after 1015 AM today  You may resume your regular diet - however -  remember your colon is empty and a heavy meal will produce gas.   Avoid these foods:  vegetables, fried / greasy foods, carbonated drinks    MEDICATIONS:  [unfilled]    ACTIVITY  You may resume your normal daily activities until tomorrow AM;  Spend the remainder of the day resting -  avoid any strenuous activity.  CALL M.D.  ANY SIGN OF   Increasing pain, nausea, vomiting  Abdominal distension (swelling)  New increased bleeding (oral or rectal)  Fever (chills)  Pain in chest area  Bloody discharge from nose or mouth  Shortness of breath    IMPRESSION:  -Normal esophageal mucosa without obstruction; biopsied to exclude inflammation  -Sliding medium-sized 5cm hiatal hernia from 36-41cm; biopsied obtained to exclude inflammation  -Normal stomach mucosa  -Normal duodenal mucosa    Follow-up Instructions:   Call Dr. Griffin for the results of procedure / biopsy in 7-10 days   Telephone # [phone]  This information will be forwarded to .    Tomas Griffin MD    Patient Education on Sedation / Analgesia Administered for Procedure      For 24 hours after general anesthesia or intravenous analgesia / sedation:  Have someone responsible help you with your care  Limit your activities  Do

## 2025-01-14 NOTE — PROGRESS NOTES
Endoscopy recovery  Patient returned to baseline, vital signs stable (see vital sign flowsheet). Patient offered liquids and tolerated well. Respiratory status within defined limits. Abdomen soft not tender. Skin with in defined limits. Responsible party driving patient home was given the opportunity to ask questions. Patient discharged with documented belongings.'

## 2025-01-14 NOTE — ANESTHESIA POSTPROCEDURE EVALUATION
Department of Anesthesiology  Postprocedure Note    Patient: Malena Avila  MRN: [id number]  YOB: 1950  Date of evaluation: 1/14/2025    Procedure Summary       Date: 01/14/25 Room / Location: [facility] ENDO 01 / [facility] END[facility]COPY    Anesthesia Start: 0932 Anesthesia Stop: 0953    Procedure: ESOPHAGOGASTRODUODENOSCOPY BIOPSY (Upper GI Region) Diagnosis:       Dysphagia, unspecified type      Hiatal hernia      (Dysphagia, unspecified type [R13.10])    Surgeons: Tomas Griffin MD Responsible Provider: Saqib Strong MD    Anesthesia Type: MAC, TIVA ASA Status: 2            Anesthesia Type: MAC, TIVA    Corey Phase I: Corey Score: 10    Corey Phase II: Corey Score: 10    Anesthesia Post Evaluation    Patient location during evaluation: PACU  Patient participation: complete - patient participated  Level of consciousness: awake  Airway patency: patent  Nausea & Vomiting: no vomiting  Cardiovascular status: hemodynamically stable  Respiratory status: acceptable  Hydration status: euvolemic    No notable events documented.

## 2025-01-14 NOTE — OP NOTE
from 36-41cm; biopsied obtained to exclude inflammation  -Normal stomach mucosa  -Normal duodenal mucosa    Recommendations:  -Continue acid suppression., -Await pathology., -Follow symptoms.    Discharge disposition:  Home in the company of  when able to ambulate    Tomas Griffin MD

## 2025-01-14 NOTE — ANESTHESIA PRE PROCEDURE
Department of Anesthesiology  Preprocedure Note       Name:  Malena Avila   Age:  74 y.o.  :  1950                                          MRN:  [id number]         Date:  2025      Surgeon: Surgeon(s):  Tomas Griffin MD    Procedure: Procedure(s):  ESOPHAGOGASTRODUODENOSCOPY BIOPSY    Medications prior to admission:   Prior to Admission medications    Medication Sig Start Date End Date Taking? Authorizing Provider   levothyroxine (SYNTHROID) 75 MCG tablet Take 1 tablet by mouth Daily   Yes Provider, MD Flynn   FUROSEMIDE PO Take 10 mg by mouth daily    Automatic Reconciliation, Ar   amLODIPine (NORVASC) 10 MG tablet Take 1 tablet by mouth daily 2/18/15   Automatic Reconciliation, Ar   aspirin 81 MG chewable tablet Take 1 tablet by mouth daily    Automatic Reconciliation, Ar   carvedilol (COREG) 12.5 MG tablet Take 1 tablet by mouth 2 times daily 19   Automatic Reconciliation, Ar   gabapentin (NEURONTIN) 600 MG tablet Take 2 tablets by mouth 2 times daily.    Automatic Reconciliation, Ar   losartan (COZAAR) 100 MG tablet Take 1 tablet by mouth daily 2/18/15   Automatic Reconciliation, Ar   magnesium Oxide 500 MG TABS Take 500 mg by mouth daily  Patient not taking: Reported on 2/10/2024    Automatic Reconciliation, Ar   Melatonin 10 MG TABS Take by mouth nightly as needed    Automatic Reconciliation, Ar   meloxicam (MOBIC) 15 MG tablet Take 1 tablet by mouth daily    Automatic Reconciliation, Ar   metFORMIN (GLUCOPHAGE) 500 MG tablet metformin 500 mg tablet  Patient not taking: Reported on 2/10/2024    Automatic Reconciliation, Ar   methocarbamol (ROBAXIN) 750 MG tablet Take 750 mg by mouth 3 times daily as needed  Patient not taking: Reported on 2/10/2024 7/31/20   Automatic Reconciliation, Ar   omeprazole (PRILOSEC) 20 MG delayed release capsule Take 1 capsule by mouth    Automatic Reconciliation, Ar   potassium chloride (MICRO-K) 10 MEQ extended release capsule Take 1 capsule by mouth

## 2025-01-14 NOTE — PROGRESS NOTES
Endoscopy Case End Note:    0944:  Procedure scope was pre-cleaned, per protocol, at bedside by TEENA Lucas RN.      0947:  Report received from anesthesia - E Federico BOATENG.  See anesthesia flowsheet for intra-procedure vital signs and events.

## 2025-01-14 NOTE — H&P
Gastroenterology Outpatient History and Physical    Patient: Malena GARCIA [facility]    Physician: Tomas Griffin MD    Chief Complaint: dysphagia  History of Present Illness: 75yo F with solid phase dysphagia.  No melenba or hematemsis    History:  Past Medical History:   Diagnosis Date    Acid reflux     Adverse effect of anesthesia     slow to wake after surgery    Arrhythmia     MVP    Arthritis     Autoimmune disease (HCC)     lichen planus    CAD (coronary artery disease)     MVP,    Fibromyalgia     GERD (gastroesophageal reflux disease)     Hypertension     Menopause     Nausea & vomiting     Pancreatitis     Plantar fasciitis     PONV (postoperative nausea and vomiting)     Thyroid disease     TMJ (dislocation of temporomandibular joint)       Past Surgical History:   Procedure Laterality Date    APPENDECTOMY      CHOLECYSTECTOMY      COLONOSCOPY      COLONOSCOPY,DIAGNOSTIC  10/1/2015         GYN      d & c    HYSTERECTOMY (CERVIX STATUS UNKNOWN)      partial    ORTHOPEDIC SURGERY  2/16/15    LEFT TOTAL KNEE ARTHROPLASTY WITH NAVIGATION     TMJ ARTHROSCOPY/SURGERY      TONSILLECTOMY      TOTAL KNEE ARTHROPLASTY  2014/June    right    UROLOGICAL SURGERY      suburethral sling      Social History     Socioeconomic History    Marital status:      Spouse name: None    Number of children: None    Years of education: None    Highest education level: None   Tobacco Use    Smoking status: Never    Smokeless tobacco: Never   Substance and Sexual Activity    Alcohol use: Yes     Alcohol/week: 1.0 - 2.0 standard drink of alcohol     Types: 1 - 2 Glasses of wine per week    Drug use: No      Family History   Problem Relation Age of Onset    Breast Cancer Mother 62    Delayed Awakening Father     Heart Disease Father     Breast Cancer Maternal Grandmother       Patient Active Problem List   Diagnosis    Knee arthropathy    Spinal stenosis       Allergies:   Allergies   Allergen Reactions    Codeine Other (See Comments)

## 2025-01-15 ENCOUNTER — OFFICE VISIT (OUTPATIENT)
Age: 75
End: 2025-01-15
Payer: MEDICARE

## 2025-01-15 VITALS
HEART RATE: 79 BPM | HEIGHT: 62 IN | WEIGHT: 188.2 LBS | OXYGEN SATURATION: 94 % | BODY MASS INDEX: 34.63 KG/M2 | TEMPERATURE: 97 F | DIASTOLIC BLOOD PRESSURE: 76 MMHG | RESPIRATION RATE: 20 BRPM | SYSTOLIC BLOOD PRESSURE: 117 MMHG

## 2025-01-15 DIAGNOSIS — K21.9 PARAESOPHAGEAL HERNIA WITH GASTROESOPHAGEAL REFLUX: Primary | ICD-10-CM

## 2025-01-15 DIAGNOSIS — K44.9 PARAESOPHAGEAL HERNIA WITH GASTROESOPHAGEAL REFLUX: Primary | ICD-10-CM

## 2025-01-15 PROCEDURE — 1036F TOBACCO NON-USER: CPT | Performed by: SURGERY

## 2025-01-15 PROCEDURE — 1159F MED LIST DOCD IN RCRD: CPT | Performed by: SURGERY

## 2025-01-15 PROCEDURE — 1123F ACP DISCUSS/DSCN MKR DOCD: CPT | Performed by: SURGERY

## 2025-01-15 PROCEDURE — 1160F RVW MEDS BY RX/DR IN RCRD: CPT | Performed by: SURGERY

## 2025-01-15 PROCEDURE — 1125F AMNT PAIN NOTED PAIN PRSNT: CPT | Performed by: SURGERY

## 2025-01-15 PROCEDURE — 3017F COLORECTAL CA SCREEN DOC REV: CPT | Performed by: SURGERY

## 2025-01-15 PROCEDURE — 99203 OFFICE O/P NEW LOW 30 MIN: CPT | Performed by: SURGERY

## 2025-01-15 PROCEDURE — 1090F PRES/ABSN URINE INCON ASSESS: CPT | Performed by: SURGERY

## 2025-01-15 PROCEDURE — G8400 PT W/DXA NO RESULTS DOC: HCPCS | Performed by: SURGERY

## 2025-01-15 PROCEDURE — G8417 CALC BMI ABV UP PARAM F/U: HCPCS | Performed by: SURGERY

## 2025-01-15 PROCEDURE — G8427 DOCREV CUR MEDS BY ELIG CLIN: HCPCS | Performed by: SURGERY

## 2025-01-15 RX ORDER — POTASSIUM CHLORIDE 750 MG/1
TABLET, EXTENDED RELEASE ORAL
COMMUNITY
Start: 2024-12-14

## 2025-01-15 RX ORDER — OMEPRAZOLE 40 MG/1
CAPSULE, DELAYED RELEASE ORAL
COMMUNITY
Start: 2024-12-21

## 2025-01-15 RX ORDER — FUROSEMIDE 40 MG/1
TABLET ORAL
COMMUNITY
Start: 2024-12-14

## 2025-01-15 ASSESSMENT — PATIENT HEALTH QUESTIONNAIRE - PHQ9
1. LITTLE INTEREST OR PLEASURE IN DOING THINGS: NOT AT ALL
SUM OF ALL RESPONSES TO PHQ QUESTIONS 1-9: 0
SUM OF ALL RESPONSES TO PHQ9 QUESTIONS 1 & 2: 0
SUM OF ALL RESPONSES TO PHQ QUESTIONS 1-9: 0
2. FEELING DOWN, DEPRESSED OR HOPELESS: NOT AT ALL

## 2025-01-15 NOTE — PROGRESS NOTES
Subjective:   1/15/25:    Malena Avila is a 74 y.o. female who presents for evaluation of GERD. She has been on omeprazole for years. She follows up with Dr. Griffin. She reports epigastric burning pain and regurgitation. About the last 6 months, its been getting worse. She starts to eat and gets stuck. She would have severe pain and last 4-5 hours. She would take galvescon, pepto, ginger ale, nothing worked. Doesn't wake up at night coughing. She sleeps propped up. No voice hoarseness. .       1/14/25: EGD with Dr. Griffin: Findings:    -Normal esophageal mucosa without obstruction; biopsied to exclude inflammation  -Sliding medium-sized 5cm hiatal hernia from 36-41cm; biopsied obtained to exclude inflammation  -Normal stomach mucosa  -Normal duodenal mucosa    12/30/24: Barium Swallow: IMPRESSION:  1. Moderate to large paraesophageal hernia.  2. 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  3. Tertiary contractions associated.    Past Medical History:   Diagnosis Date    Acid reflux     Adverse effect of anesthesia     slow to wake after surgery    Allergic rhinitis     Anxiety No idea    Arrhythmia     MVP    Arthritis     Autoimmune disease (HCC)     lichen planus    CAD (coronary artery disease)     MVP,    Fibromyalgia     GERD (gastroesophageal reflux disease)     Hypertension     Hypothyroidism     Menopause     Nausea & vomiting     Obesity     Osteoarthritis 2000    Pancreatitis     Plantar fasciitis     PONV (postoperative nausea and vomiting)     Thyroid disease     TMJ (dislocation of temporomandibular joint)     Urinary incontinence      Past Surgical History:   Procedure Laterality Date    APPENDECTOMY      CHOLECYSTECTOMY      COLONOSCOPY      COLONOSCOPY,DIAGNOSTIC  10/1/2015         COSMETIC SURGERY      On nose    EYE SURGERY      On lids & cataracts    GYN      d & c    HYSTERECTOMY (CERVIX STATUS UNKNOWN)      partial    KNEE SURGERY  2014,2015    Both knees

## 2025-01-15 NOTE — PROGRESS NOTES
Identified pt with two pt identifiers (name and ). Reviewed chart in preparation for visit and have obtained necessary documentation.    Malena Avila is a 74 y.o. female  Chief Complaint   Patient presents with    New Patient     Seen at the request of Dr. Tomas Griffin, [facility]     /76 (Site: Left Upper Arm, Position: Sitting, Cuff Size: Small Adult)   Pulse 79   Temp 97 °F (36.1 °C) (Temporal)   Resp 20   Ht 1.562 m (5' 1.5\")   Wt 85.4 kg (188 lb 3.2 oz)   SpO2 94%   BMI 34.98 kg/m²     1. Have you been to the ER, urgent care clinic since your last visit?  Hospitalized since your last visit?no    2. Have you seen or consulted any other health care providers outside of the [facility] since your last visit?  Include any pap smears or colon screening. no

## 2025-01-21 ENCOUNTER — HOSPITAL ENCOUNTER (OUTPATIENT)
Age: 75
Discharge: HOME | End: 2025-01-24
Payer: MEDICARE

## 2025-01-21 DIAGNOSIS — M51.362 DEGENERATION OF INTERVERTEBRAL DISC OF LUMBAR REGION WITH DISCOGENIC BACK PAIN AND LOWER EXTREMITY PAIN: ICD-10-CM

## 2025-01-21 DIAGNOSIS — Z98.1 ARTHRODESIS STATUS: ICD-10-CM

## 2025-01-21 DIAGNOSIS — M54.50 LOW BACK PAIN, UNSPECIFIED BACK PAIN LATERALITY, UNSPECIFIED CHRONICITY, UNSPECIFIED WHETHER SCIATICA PRESENT: ICD-10-CM

## 2025-01-21 DIAGNOSIS — M54.2 CERVICALGIA: ICD-10-CM

## 2025-01-21 DIAGNOSIS — M54.16 LUMBAR RADICULOPATHY: ICD-10-CM

## 2025-01-21 DIAGNOSIS — M54.12 BRACHIAL NEURITIS: ICD-10-CM

## 2025-01-21 PROCEDURE — 72158 MRI LUMBAR SPINE W/O & W/DYE: CPT

## 2025-01-21 PROCEDURE — 6360000004 HC RX CONTRAST MEDICATION: Performed by: RADIOLOGY

## 2025-01-21 PROCEDURE — A9579 GAD-BASE MR CONTRAST NOS,1ML: HCPCS | Performed by: RADIOLOGY

## 2025-01-21 PROCEDURE — 72141 MRI NECK SPINE W/O DYE: CPT

## 2025-01-21 RX ADMIN — GADOTERIDOL 17 ML: 279.3 INJECTION, SOLUTION INTRAVENOUS at 09:58

## 2025-01-30 ENCOUNTER — APPOINTMENT (OUTPATIENT)
Facility: HOSPITAL | Age: 75
DRG: 329 | End: 2025-01-30
Payer: MEDICARE

## 2025-01-30 ENCOUNTER — HOSPITAL ENCOUNTER (INPATIENT)
Facility: HOSPITAL | Age: 75
LOS: 11 days | Discharge: HOME HEALTH | DRG: 329 | End: 2025-02-10
Attending: STUDENT IN AN ORGANIZED HEALTH CARE EDUCATION/TRAINING PROGRAM | Admitting: SURGERY
Payer: MEDICARE

## 2025-01-30 DIAGNOSIS — K56.50 SMALL BOWEL OBSTRUCTION DUE TO ADHESIONS (HCC): Primary | ICD-10-CM

## 2025-01-30 DIAGNOSIS — R11.0 NAUSEA: ICD-10-CM

## 2025-01-30 DIAGNOSIS — R10.31 ABDOMINAL PAIN, RIGHT LOWER QUADRANT: ICD-10-CM

## 2025-01-30 PROBLEM — K56.609 SBO (SMALL BOWEL OBSTRUCTION) (HCC): Status: ACTIVE | Noted: 2025-01-30

## 2025-01-30 LAB
ALBUMIN SERPL-MCNC: 3.5 G/DL (ref 3.5–5)
ALBUMIN/GLOB SERPL: 1.2 (ref 1.1–2.2)
ALP SERPL-CCNC: 80 U/L (ref 45–117)
ALT SERPL-CCNC: 18 U/L (ref 12–78)
ANION GAP SERPL CALC-SCNC: 4 MMOL/L (ref 2–12)
APPEARANCE UR: CLEAR
AST SERPL-CCNC: 14 U/L (ref 15–37)
BACTERIA URNS QL MICRO: NEGATIVE /HPF
BASOPHILS # BLD: 0.02 K/UL (ref 0–0.1)
BASOPHILS NFR BLD: 0.5 % (ref 0–1)
BILIRUB SERPL-MCNC: 0.4 MG/DL (ref 0.2–1)
BILIRUB UR QL: NEGATIVE
BUN SERPL-MCNC: 21 MG/DL (ref 6–20)
BUN/CREAT SERPL: 24 (ref 12–20)
CALCIUM SERPL-MCNC: 9.5 MG/DL (ref 8.5–10.1)
CHLORIDE SERPL-SCNC: 104 MMOL/L (ref 97–108)
CO2 SERPL-SCNC: 29 MMOL/L (ref 21–32)
COLOR UR: ABNORMAL
CREAT SERPL-MCNC: 0.88 MG/DL (ref 0.55–1.02)
DIFFERENTIAL METHOD BLD: ABNORMAL
EOSINOPHIL # BLD: 0.26 K/UL (ref 0–0.4)
EOSINOPHIL NFR BLD: 6 % (ref 0–7)
EPITH CASTS URNS QL MICRO: ABNORMAL /LPF
ERYTHROCYTE [DISTWIDTH] IN BLOOD BY AUTOMATED COUNT: 14 % (ref 11.5–14.5)
GLOBULIN SER CALC-MCNC: 3 G/DL (ref 2–4)
GLUCOSE SERPL-MCNC: 125 MG/DL (ref 65–100)
GLUCOSE UR STRIP.AUTO-MCNC: 100 MG/DL
HCT VFR BLD AUTO: 42.3 % (ref 35–47)
HGB BLD-MCNC: 13.9 G/DL (ref 11.5–16)
HGB UR QL STRIP: NEGATIVE
HYALINE CASTS URNS QL MICRO: ABNORMAL /LPF (ref 0–2)
IMM GRANULOCYTES # BLD AUTO: 0.01 K/UL (ref 0–0.04)
IMM GRANULOCYTES NFR BLD AUTO: 0.2 % (ref 0–0.5)
KETONES UR QL STRIP.AUTO: NEGATIVE MG/DL
LACTATE BLD-SCNC: 0.99 MMOL/L (ref 0.4–2)
LEUKOCYTE ESTERASE UR QL STRIP.AUTO: ABNORMAL
LIPASE SERPL-CCNC: 10 U/L (ref 13–75)
LYMPHOCYTES # BLD: 0.76 K/UL (ref 0.8–3.5)
LYMPHOCYTES NFR BLD: 17.6 % (ref 12–49)
MCH RBC QN AUTO: 29.7 PG (ref 26–34)
MCHC RBC AUTO-ENTMCNC: 32.9 G/DL (ref 30–36.5)
MCV RBC AUTO: 90.4 FL (ref 80–99)
MONOCYTES # BLD: 0.27 K/UL (ref 0–1)
MONOCYTES NFR BLD: 6.3 % (ref 5–13)
NEUTS SEG # BLD: 2.98 K/UL (ref 1.8–8)
NEUTS SEG NFR BLD: 69.4 % (ref 32–75)
NITRITE UR QL STRIP.AUTO: NEGATIVE
NRBC # BLD: 0 K/UL (ref 0–0.01)
NRBC BLD-RTO: 0 PER 100 WBC
PH UR STRIP: 6 (ref 5–8)
PLATELET # BLD AUTO: 196 K/UL (ref 150–400)
PMV BLD AUTO: 10.2 FL (ref 8.9–12.9)
POTASSIUM SERPL-SCNC: 4 MMOL/L (ref 3.5–5.1)
PROT SERPL-MCNC: 6.5 G/DL (ref 6.4–8.2)
PROT UR STRIP-MCNC: NEGATIVE MG/DL
RBC # BLD AUTO: 4.68 M/UL (ref 3.8–5.2)
RBC #/AREA URNS HPF: ABNORMAL /HPF (ref 0–5)
RBC MORPH BLD: ABNORMAL
SODIUM SERPL-SCNC: 137 MMOL/L (ref 136–145)
SP GR UR REFRACTOMETRY: 1.01 (ref 1–1.03)
TROPONIN I SERPL HS-MCNC: 6 NG/L (ref 0–51)
URINE CULTURE IF INDICATED: ABNORMAL
UROBILINOGEN UR QL STRIP.AUTO: 0.2 EU/DL (ref 0.2–1)
WBC # BLD AUTO: 4.3 K/UL (ref 3.6–11)
WBC URNS QL MICRO: ABNORMAL /HPF (ref 0–4)

## 2025-01-30 PROCEDURE — 6360000002 HC RX W HCPCS: Performed by: SURGERY

## 2025-01-30 PROCEDURE — 96375 TX/PRO/DX INJ NEW DRUG ADDON: CPT

## 2025-01-30 PROCEDURE — 6370000000 HC RX 637 (ALT 250 FOR IP): Performed by: SURGERY

## 2025-01-30 PROCEDURE — 6360000002 HC RX W HCPCS: Performed by: STUDENT IN AN ORGANIZED HEALTH CARE EDUCATION/TRAINING PROGRAM

## 2025-01-30 PROCEDURE — 83605 ASSAY OF LACTIC ACID: CPT

## 2025-01-30 PROCEDURE — 99222 1ST HOSP IP/OBS MODERATE 55: CPT | Performed by: NURSE PRACTITIONER

## 2025-01-30 PROCEDURE — 2500000003 HC RX 250 WO HCPCS: Performed by: SURGERY

## 2025-01-30 PROCEDURE — 36415 COLL VENOUS BLD VENIPUNCTURE: CPT

## 2025-01-30 PROCEDURE — 6360000004 HC RX CONTRAST MEDICATION: Performed by: STUDENT IN AN ORGANIZED HEALTH CARE EDUCATION/TRAINING PROGRAM

## 2025-01-30 PROCEDURE — 96374 THER/PROPH/DIAG INJ IV PUSH: CPT

## 2025-01-30 PROCEDURE — 83690 ASSAY OF LIPASE: CPT

## 2025-01-30 PROCEDURE — 96361 HYDRATE IV INFUSION ADD-ON: CPT

## 2025-01-30 PROCEDURE — 84484 ASSAY OF TROPONIN QUANT: CPT

## 2025-01-30 PROCEDURE — 74177 CT ABD & PELVIS W/CONTRAST: CPT

## 2025-01-30 PROCEDURE — 2580000003 HC RX 258: Performed by: STUDENT IN AN ORGANIZED HEALTH CARE EDUCATION/TRAINING PROGRAM

## 2025-01-30 PROCEDURE — 81001 URINALYSIS AUTO W/SCOPE: CPT

## 2025-01-30 PROCEDURE — 6360000002 HC RX W HCPCS: Performed by: NURSE PRACTITIONER

## 2025-01-30 PROCEDURE — 85025 COMPLETE CBC W/AUTO DIFF WBC: CPT

## 2025-01-30 PROCEDURE — 1100000000 HC RM PRIVATE

## 2025-01-30 PROCEDURE — 99285 EMERGENCY DEPT VISIT HI MDM: CPT

## 2025-01-30 PROCEDURE — 80053 COMPREHEN METABOLIC PANEL: CPT

## 2025-01-30 PROCEDURE — 0D9670Z DRAINAGE OF STOMACH WITH DRAINAGE DEVICE, VIA NATURAL OR ARTIFICIAL OPENING: ICD-10-PCS | Performed by: SURGERY

## 2025-01-30 RX ORDER — ACETAMINOPHEN 325 MG/1
650 TABLET ORAL EVERY 4 HOURS PRN
Status: DISCONTINUED | OUTPATIENT
Start: 2025-01-30 | End: 2025-02-01

## 2025-01-30 RX ORDER — IOPAMIDOL 755 MG/ML
100 INJECTION, SOLUTION INTRAVASCULAR
Status: COMPLETED | OUTPATIENT
Start: 2025-01-30 | End: 2025-01-30

## 2025-01-30 RX ORDER — OXYMETAZOLINE HYDROCHLORIDE 0.05 G/100ML
2 SPRAY NASAL ONCE
Status: COMPLETED | OUTPATIENT
Start: 2025-01-30 | End: 2025-01-30

## 2025-01-30 RX ORDER — HYDROMORPHONE HYDROCHLORIDE 1 MG/ML
1 INJECTION, SOLUTION INTRAMUSCULAR; INTRAVENOUS; SUBCUTANEOUS
Status: COMPLETED | OUTPATIENT
Start: 2025-01-30 | End: 2025-01-30

## 2025-01-30 RX ORDER — LORAZEPAM 2 MG/ML
0.5 INJECTION INTRAMUSCULAR ONCE
Status: COMPLETED | OUTPATIENT
Start: 2025-01-30 | End: 2025-01-30

## 2025-01-30 RX ORDER — MORPHINE SULFATE 2 MG/ML
2 INJECTION, SOLUTION INTRAMUSCULAR; INTRAVENOUS ONCE
Status: COMPLETED | OUTPATIENT
Start: 2025-01-30 | End: 2025-01-30

## 2025-01-30 RX ORDER — VENLAFAXINE 75 MG/1
75 TABLET ORAL
COMMUNITY

## 2025-01-30 RX ORDER — FLUOCINONIDE TOPICAL SOLUTION USP, 0.05% 0.5 MG/ML
SOLUTION TOPICAL 2 TIMES DAILY
COMMUNITY

## 2025-01-30 RX ORDER — LIDOCAINE HYDROCHLORIDE 20 MG/ML
JELLY TOPICAL ONCE
Status: COMPLETED | OUTPATIENT
Start: 2025-01-30 | End: 2025-01-30

## 2025-01-30 RX ORDER — DEXTROSE MONOHYDRATE, SODIUM CHLORIDE, AND POTASSIUM CHLORIDE 50; 1.49; 4.5 G/1000ML; G/1000ML; G/1000ML
INJECTION, SOLUTION INTRAVENOUS CONTINUOUS
Status: DISCONTINUED | OUTPATIENT
Start: 2025-01-30 | End: 2025-02-01

## 2025-01-30 RX ORDER — SODIUM CHLORIDE 0.9 % (FLUSH) 0.9 %
5-40 SYRINGE (ML) INJECTION PRN
Status: DISCONTINUED | OUTPATIENT
Start: 2025-01-30 | End: 2025-02-10 | Stop reason: HOSPADM

## 2025-01-30 RX ORDER — SODIUM CHLORIDE 9 MG/ML
INJECTION, SOLUTION INTRAVENOUS PRN
Status: DISCONTINUED | OUTPATIENT
Start: 2025-01-30 | End: 2025-02-02

## 2025-01-30 RX ORDER — ONDANSETRON 2 MG/ML
4 INJECTION INTRAMUSCULAR; INTRAVENOUS ONCE
Status: COMPLETED | OUTPATIENT
Start: 2025-01-30 | End: 2025-01-30

## 2025-01-30 RX ORDER — ONDANSETRON 4 MG/1
4 TABLET, ORALLY DISINTEGRATING ORAL EVERY 8 HOURS PRN
Status: DISCONTINUED | OUTPATIENT
Start: 2025-01-30 | End: 2025-02-01

## 2025-01-30 RX ORDER — HYDROMORPHONE HYDROCHLORIDE 1 MG/ML
0.25 INJECTION, SOLUTION INTRAMUSCULAR; INTRAVENOUS; SUBCUTANEOUS
Status: DISCONTINUED | OUTPATIENT
Start: 2025-01-30 | End: 2025-02-01

## 2025-01-30 RX ORDER — 0.9 % SODIUM CHLORIDE 0.9 %
1000 INTRAVENOUS SOLUTION INTRAVENOUS ONCE
Status: COMPLETED | OUTPATIENT
Start: 2025-01-30 | End: 2025-01-30

## 2025-01-30 RX ORDER — MORPHINE SULFATE 4 MG/ML
4 INJECTION, SOLUTION INTRAMUSCULAR; INTRAVENOUS
Status: COMPLETED | OUTPATIENT
Start: 2025-01-30 | End: 2025-01-30

## 2025-01-30 RX ORDER — HYDROMORPHONE HYDROCHLORIDE 1 MG/ML
0.5 INJECTION, SOLUTION INTRAMUSCULAR; INTRAVENOUS; SUBCUTANEOUS
Status: DISCONTINUED | OUTPATIENT
Start: 2025-01-30 | End: 2025-02-01

## 2025-01-30 RX ORDER — SODIUM CHLORIDE 0.9 % (FLUSH) 0.9 %
5-40 SYRINGE (ML) INJECTION EVERY 12 HOURS SCHEDULED
Status: DISCONTINUED | OUTPATIENT
Start: 2025-01-30 | End: 2025-02-02

## 2025-01-30 RX ORDER — HYDROMORPHONE HYDROCHLORIDE 1 MG/ML
0.5 INJECTION, SOLUTION INTRAMUSCULAR; INTRAVENOUS; SUBCUTANEOUS ONCE
Status: DISCONTINUED | OUTPATIENT
Start: 2025-01-30 | End: 2025-01-30

## 2025-01-30 RX ORDER — ONDANSETRON 2 MG/ML
4 INJECTION INTRAMUSCULAR; INTRAVENOUS EVERY 6 HOURS PRN
Status: DISCONTINUED | OUTPATIENT
Start: 2025-01-30 | End: 2025-02-10 | Stop reason: HOSPADM

## 2025-01-30 RX ORDER — HYDRALAZINE HYDROCHLORIDE 20 MG/ML
10 INJECTION INTRAMUSCULAR; INTRAVENOUS EVERY 6 HOURS PRN
Status: DISCONTINUED | OUTPATIENT
Start: 2025-01-30 | End: 2025-01-31 | Stop reason: HOSPADM

## 2025-01-30 RX ORDER — MAGNESIUM HYDROXIDE/ALUMINUM HYDROXICE/SIMETHICONE 120; 1200; 1200 MG/30ML; MG/30ML; MG/30ML
30 SUSPENSION ORAL EVERY 6 HOURS PRN
Status: DISCONTINUED | OUTPATIENT
Start: 2025-01-30 | End: 2025-02-01

## 2025-01-30 RX ADMIN — ONDANSETRON 4 MG: 2 INJECTION INTRAMUSCULAR; INTRAVENOUS at 13:47

## 2025-01-30 RX ADMIN — HYDROMORPHONE HYDROCHLORIDE 0.5 MG: 1 INJECTION, SOLUTION INTRAMUSCULAR; INTRAVENOUS; SUBCUTANEOUS at 22:00

## 2025-01-30 RX ADMIN — MORPHINE SULFATE 4 MG: 4 INJECTION, SOLUTION INTRAMUSCULAR; INTRAVENOUS at 07:39

## 2025-01-30 RX ADMIN — SODIUM CHLORIDE, PRESERVATIVE FREE 10 ML: 5 INJECTION INTRAVENOUS at 13:47

## 2025-01-30 RX ADMIN — DEXTROSE MONOHYDRATE, SODIUM CHLORIDE, AND POTASSIUM CHLORIDE: 50; 4.5; 1.49 INJECTION, SOLUTION INTRAVENOUS at 18:30

## 2025-01-30 RX ADMIN — IOPAMIDOL 100 ML: 755 INJECTION, SOLUTION INTRAVENOUS at 08:44

## 2025-01-30 RX ADMIN — SODIUM CHLORIDE 1000 ML: 9 INJECTION, SOLUTION INTRAVENOUS at 07:36

## 2025-01-30 RX ADMIN — SODIUM CHLORIDE, PRESERVATIVE FREE 10 ML: 5 INJECTION INTRAVENOUS at 21:16

## 2025-01-30 RX ADMIN — HYDROMORPHONE HYDROCHLORIDE 0.5 MG: 1 INJECTION, SOLUTION INTRAMUSCULAR; INTRAVENOUS; SUBCUTANEOUS at 18:27

## 2025-01-30 RX ADMIN — LORAZEPAM 0.5 MG: 2 INJECTION INTRAMUSCULAR; INTRAVENOUS at 10:10

## 2025-01-30 RX ADMIN — ONDANSETRON 4 MG: 2 INJECTION INTRAMUSCULAR; INTRAVENOUS at 07:38

## 2025-01-30 RX ADMIN — ONDANSETRON 4 MG: 2 INJECTION INTRAMUSCULAR; INTRAVENOUS at 18:26

## 2025-01-30 RX ADMIN — MORPHINE SULFATE 2 MG: 2 INJECTION, SOLUTION INTRAMUSCULAR; INTRAVENOUS at 10:08

## 2025-01-30 RX ADMIN — TOPICAL ANESTHETIC: 200 SPRAY DENTAL; PERIODONTAL at 10:17

## 2025-01-30 RX ADMIN — OXYMETAZOLINE HYDROCHLORIDE 2 SPRAY: 0.5 SPRAY NASAL at 10:12

## 2025-01-30 RX ADMIN — DEXTROSE MONOHYDRATE, SODIUM CHLORIDE, AND POTASSIUM CHLORIDE: 50; 4.5; 1.49 INJECTION, SOLUTION INTRAVENOUS at 10:26

## 2025-01-30 RX ADMIN — LIDOCAINE HYDROCHLORIDE: 20 JELLY TOPICAL at 10:12

## 2025-01-30 RX ADMIN — HYDROMORPHONE HYDROCHLORIDE 0.5 MG: 1 INJECTION, SOLUTION INTRAMUSCULAR; INTRAVENOUS; SUBCUTANEOUS at 13:49

## 2025-01-30 RX ADMIN — HYDROMORPHONE HYDROCHLORIDE 1 MG: 1 INJECTION, SOLUTION INTRAMUSCULAR; INTRAVENOUS; SUBCUTANEOUS at 09:20

## 2025-01-30 ASSESSMENT — PAIN DESCRIPTION - LOCATION
LOCATION: ABDOMEN
LOCATION: ABDOMEN

## 2025-01-30 ASSESSMENT — PAIN SCALES - GENERAL
PAINLEVEL_OUTOF10: 8
PAINLEVEL_OUTOF10: 10
PAINLEVEL_OUTOF10: 5
PAINLEVEL_OUTOF10: 10
PAINLEVEL_OUTOF10: 10
PAINLEVEL_OUTOF10: 9

## 2025-01-30 ASSESSMENT — PAIN - FUNCTIONAL ASSESSMENT: PAIN_FUNCTIONAL_ASSESSMENT: 0-10

## 2025-01-30 ASSESSMENT — PAIN DESCRIPTION - ORIENTATION
ORIENTATION: RIGHT
ORIENTATION: ANTERIOR

## 2025-01-30 ASSESSMENT — PAIN DESCRIPTION - DESCRIPTORS: DESCRIPTORS: ACHING;SHARP

## 2025-01-30 NOTE — H&P
SPLEEN: within normal limits. PANCREAS: Fatty replaced. ADRENALS: Unremarkable. KIDNEYS: No mass, calculus, or hydronephrosis. STOMACH: Large hiatal hernia. SMALL BOWEL: Distended small bowel loops in the right abdomen are noted compatible with obstruction likely related to adhesion formation in the right abdomen. Mild associated mesenteric infiltrate is noted. COLON: No dilatation or wall thickening. Moderate fecal stasis. Sigmoid diverticulosis. APPENDIX: Surgically absent. PERITONEUM: No ascites or pneumoperitoneum. RETROPERITONEUM: No lymphadenopathy or aortic aneurysm. REPRODUCTIVE ORGANS: Unremarkable. URINARY BLADDER: No mass or calculus. BONES: Postoperative and degenerative changes lumbar spine. ABDOMINAL WALL: No mass or hernia. ADDITIONAL COMMENTS: N/A     Distended small bowel loops in the right abdomen compatible with obstruction likely related to adhesion formation with associated mesenteric infiltrate. Localized closed-loop obstruction should be considered. Electronically signed by DADA BARAHONA      Assessment      [facility] Problems             Last Modified POA    * (Principal) SBO (small bowel obstruction) ([facility]) 1/30/2025 Yes       Plan   Admit to inpatient  NGT placement and decompression  Gastrografin challenge later today  Xrays in am.   Education to patient and family about treatment plan.     Consultations Ordered:  None    Electronically signed by DAY Espitia NP on 1/30/25 at 9:36 AM EST

## 2025-01-30 NOTE — ED PROVIDER NOTES
[facility] EMERGENCY DEPARTMENT  EMERGENCY DEPARTMENT ENCOUNTER       Pt Name: Malena Avila  MRN: [id number]  Birthdate 1950  Date of evaluation: 1/30/2025  Provider: Domenic Zepeda MD   PCP: Mingo Rosenberg MD  Note Started: 9:48 AM EST 1/30/25     CHIEF COMPLAINT       Chief Complaint   Patient presents with    Abdominal Pain     Pt arrives from home via EMS for r side and medial abdominal pain since 0400. Pt had endoscopy yesterday at [facility] for a herniate bowel. +nausea but denies vomiting         HISTORY OF PRESENT ILLNESS: 1 or more elements      History From: Patient  HPI Limitations: None     Malena Avila is a 75 y.o. female who presents with abdominal pain localized to the upper stomach and right side. The onset of pain was today. Patient reports associated nausea but denies vomiting. Patient denies diarrhea and reports last bowel movement was yesterday. Patient has a history of constipation but states it has improved since starting an unspecified medication. Patient denies urinary symptoms. Patient denies fever. Of note, patient has had prior cholecystectomy and appendectomy.       Medical History  - Hypertension  - Cholecystectomy (gallbladder removal)  - Appendectomy     Nursing Notes were all reviewed and agreed with or any disagreements were addressed in the HPI.     REVIEW OF SYSTEMS      Review of Systems     Positives and Pertinent negatives as per HPI.    PAST HISTORY     Past Medical History:  Past Medical History:   Diagnosis Date    Acid reflux     Adverse effect of anesthesia     slow to wake after surgery    Allergic rhinitis     Anxiety No idea    Arrhythmia     MVP    Arthritis     Autoimmune disease (HCC)     lichen planus    CAD (coronary artery disease)     MVP,    Fibromyalgia     GERD (gastroesophageal reflux disease)     Hypertension     Hypothyroidism     Menopause     Nausea & vomiting     Obesity     Osteoarthritis 2000    Pancreatitis     Plantar

## 2025-01-31 ENCOUNTER — ANESTHESIA (OUTPATIENT)
Facility: HOSPITAL | Age: 75
End: 2025-01-31
Payer: MEDICARE

## 2025-01-31 ENCOUNTER — APPOINTMENT (OUTPATIENT)
Facility: HOSPITAL | Age: 75
DRG: 329 | End: 2025-01-31
Payer: MEDICARE

## 2025-01-31 ENCOUNTER — ANESTHESIA EVENT (OUTPATIENT)
Facility: HOSPITAL | Age: 75
End: 2025-01-31
Payer: MEDICARE

## 2025-01-31 ENCOUNTER — HOSPITAL ENCOUNTER (INPATIENT)
Facility: HOSPITAL | Age: 75
Discharge: HOME | DRG: 329 | End: 2025-02-03
Payer: MEDICARE

## 2025-01-31 LAB
ANION GAP SERPL CALC-SCNC: 7 MMOL/L (ref 2–12)
BASOPHILS # BLD: 0.02 K/UL (ref 0–0.1)
BASOPHILS NFR BLD: 0.1 % (ref 0–1)
BUN SERPL-MCNC: 12 MG/DL (ref 6–20)
BUN/CREAT SERPL: 15 (ref 12–20)
CALCIUM SERPL-MCNC: 8.8 MG/DL (ref 8.5–10.1)
CHLORIDE SERPL-SCNC: 106 MMOL/L (ref 97–108)
CO2 SERPL-SCNC: 24 MMOL/L (ref 21–32)
CREAT SERPL-MCNC: 0.8 MG/DL (ref 0.55–1.02)
DIFFERENTIAL METHOD BLD: ABNORMAL
EOSINOPHIL # BLD: 0 K/UL (ref 0–0.4)
EOSINOPHIL NFR BLD: 0 % (ref 0–7)
ERYTHROCYTE [DISTWIDTH] IN BLOOD BY AUTOMATED COUNT: 14 % (ref 11.5–14.5)
GLUCOSE SERPL-MCNC: 218 MG/DL (ref 65–100)
HCT VFR BLD AUTO: 42.3 % (ref 35–47)
HGB BLD-MCNC: 14.1 G/DL (ref 11.5–16)
IMM GRANULOCYTES # BLD AUTO: 0.07 K/UL (ref 0–0.04)
IMM GRANULOCYTES NFR BLD AUTO: 0.4 % (ref 0–0.5)
LYMPHOCYTES # BLD: 0.38 K/UL (ref 0.8–3.5)
LYMPHOCYTES NFR BLD: 2.3 % (ref 12–49)
MCH RBC QN AUTO: 29.7 PG (ref 26–34)
MCHC RBC AUTO-ENTMCNC: 33.3 G/DL (ref 30–36.5)
MCV RBC AUTO: 89.2 FL (ref 80–99)
MONOCYTES # BLD: 0.84 K/UL (ref 0–1)
MONOCYTES NFR BLD: 5.1 % (ref 5–13)
NEUTS SEG # BLD: 15.09 K/UL (ref 1.8–8)
NEUTS SEG NFR BLD: 92.1 % (ref 32–75)
NRBC # BLD: 0 K/UL (ref 0–0.01)
NRBC BLD-RTO: 0 PER 100 WBC
PHOSPHATE SERPL-MCNC: 2.8 MG/DL (ref 2.6–4.7)
PLATELET # BLD AUTO: 248 K/UL (ref 150–400)
PMV BLD AUTO: 10.2 FL (ref 8.9–12.9)
POTASSIUM SERPL-SCNC: 4.4 MMOL/L (ref 3.5–5.1)
RBC # BLD AUTO: 4.74 M/UL (ref 3.8–5.2)
RBC MORPH BLD: ABNORMAL
SODIUM SERPL-SCNC: 137 MMOL/L (ref 136–145)
WBC # BLD AUTO: 16.4 K/UL (ref 3.6–11)

## 2025-01-31 PROCEDURE — 3700000001 HC ADD 15 MINUTES (ANESTHESIA): Performed by: SURGERY

## 2025-01-31 PROCEDURE — 6360000002 HC RX W HCPCS: Performed by: SURGERY

## 2025-01-31 PROCEDURE — 3600000014 HC SURGERY LEVEL 4 ADDTL 15MIN: Performed by: SURGERY

## 2025-01-31 PROCEDURE — 44120 REMOVAL OF SMALL INTESTINE: CPT | Performed by: SURGERY

## 2025-01-31 PROCEDURE — 6370000000 HC RX 637 (ALT 250 FOR IP): Performed by: NURSE PRACTITIONER

## 2025-01-31 PROCEDURE — 2709999900 HC NON-CHARGEABLE SUPPLY: Performed by: SURGERY

## 2025-01-31 PROCEDURE — 85025 COMPLETE CBC W/AUTO DIFF WBC: CPT

## 2025-01-31 PROCEDURE — 6360000002 HC RX W HCPCS: Performed by: NURSE ANESTHETIST, CERTIFIED REGISTERED

## 2025-01-31 PROCEDURE — 6360000004 HC RX CONTRAST MEDICATION: Performed by: SURGERY

## 2025-01-31 PROCEDURE — 80048 BASIC METABOLIC PNL TOTAL CA: CPT

## 2025-01-31 PROCEDURE — 7100000001 HC PACU RECOVERY - ADDTL 15 MIN: Performed by: SURGERY

## 2025-01-31 PROCEDURE — 74019 RADEX ABDOMEN 2 VIEWS: CPT

## 2025-01-31 PROCEDURE — 6360000004 HC RX CONTRAST MEDICATION: Performed by: NURSE PRACTITIONER

## 2025-01-31 PROCEDURE — 2700000000 HC OXYGEN THERAPY PER DAY

## 2025-01-31 PROCEDURE — 2580000003 HC RX 258: Performed by: SURGERY

## 2025-01-31 PROCEDURE — 84100 ASSAY OF PHOSPHORUS: CPT

## 2025-01-31 PROCEDURE — 2500000003 HC RX 250 WO HCPCS: Performed by: NURSE ANESTHETIST, CERTIFIED REGISTERED

## 2025-01-31 PROCEDURE — 3700000000 HC ANESTHESIA ATTENDED CARE: Performed by: SURGERY

## 2025-01-31 PROCEDURE — 1100000000 HC RM PRIVATE

## 2025-01-31 PROCEDURE — C1713 ANCHOR/SCREW BN/BN,TIS/BN: HCPCS | Performed by: SURGERY

## 2025-01-31 PROCEDURE — 94760 N-INVAS EAR/PLS OXIMETRY 1: CPT

## 2025-01-31 PROCEDURE — 71260 CT THORAX DX C+: CPT

## 2025-01-31 PROCEDURE — 6360000002 HC RX W HCPCS: Performed by: NURSE PRACTITIONER

## 2025-01-31 PROCEDURE — 36415 COLL VENOUS BLD VENIPUNCTURE: CPT

## 2025-01-31 PROCEDURE — 99231 SBSQ HOSP IP/OBS SF/LOW 25: CPT | Performed by: SURGERY

## 2025-01-31 PROCEDURE — 2580000003 HC RX 258: Performed by: NURSE ANESTHETIST, CERTIFIED REGISTERED

## 2025-01-31 PROCEDURE — 88307 TISSUE EXAM BY PATHOLOGIST: CPT

## 2025-01-31 PROCEDURE — 7100000000 HC PACU RECOVERY - FIRST 15 MIN: Performed by: SURGERY

## 2025-01-31 PROCEDURE — 2500000003 HC RX 250 WO HCPCS: Performed by: SURGERY

## 2025-01-31 PROCEDURE — 3600000004 HC SURGERY LEVEL 4 BASE: Performed by: SURGERY

## 2025-01-31 RX ORDER — SODIUM CHLORIDE 0.9 % (FLUSH) 0.9 %
5-40 SYRINGE (ML) INJECTION EVERY 12 HOURS SCHEDULED
Status: DISCONTINUED | OUTPATIENT
Start: 2025-01-31 | End: 2025-01-31 | Stop reason: HOSPADM

## 2025-01-31 RX ORDER — FENTANYL CITRATE 50 UG/ML
25 INJECTION, SOLUTION INTRAMUSCULAR; INTRAVENOUS EVERY 5 MIN PRN
Status: DISCONTINUED | OUTPATIENT
Start: 2025-01-31 | End: 2025-01-31 | Stop reason: HOSPADM

## 2025-01-31 RX ORDER — HYDROMORPHONE HYDROCHLORIDE 1 MG/ML
0.5 INJECTION, SOLUTION INTRAMUSCULAR; INTRAVENOUS; SUBCUTANEOUS EVERY 5 MIN PRN
Status: DISCONTINUED | OUTPATIENT
Start: 2025-01-31 | End: 2025-01-31 | Stop reason: HOSPADM

## 2025-01-31 RX ORDER — LIDOCAINE HYDROCHLORIDE 20 MG/ML
JELLY TOPICAL PRN
Status: DISCONTINUED | OUTPATIENT
Start: 2025-01-31 | End: 2025-02-01

## 2025-01-31 RX ORDER — IOPAMIDOL 755 MG/ML
100 INJECTION, SOLUTION INTRAVASCULAR
Status: CANCELLED | OUTPATIENT
Start: 2025-01-31

## 2025-01-31 RX ORDER — PHENYLEPHRINE HCL IN 0.9% NACL 0.4MG/10ML
SYRINGE (ML) INTRAVENOUS
Status: DISCONTINUED | OUTPATIENT
Start: 2025-01-31 | End: 2025-01-31 | Stop reason: SDUPTHER

## 2025-01-31 RX ORDER — SUCCINYLCHOLINE/SOD CL,ISO/PF 200MG/10ML
SYRINGE (ML) INTRAVENOUS
Status: DISCONTINUED | OUTPATIENT
Start: 2025-01-31 | End: 2025-01-31 | Stop reason: SDUPTHER

## 2025-01-31 RX ORDER — ONDANSETRON 2 MG/ML
4 INJECTION INTRAMUSCULAR; INTRAVENOUS
Status: DISCONTINUED | OUTPATIENT
Start: 2025-01-31 | End: 2025-01-31 | Stop reason: HOSPADM

## 2025-01-31 RX ORDER — 0.9 % SODIUM CHLORIDE 0.9 %
500 INTRAVENOUS SOLUTION INTRAVENOUS ONCE
Status: COMPLETED | OUTPATIENT
Start: 2025-01-31 | End: 2025-01-31

## 2025-01-31 RX ORDER — NALOXONE HYDROCHLORIDE 0.4 MG/ML
INJECTION, SOLUTION INTRAMUSCULAR; INTRAVENOUS; SUBCUTANEOUS PRN
Status: DISCONTINUED | OUTPATIENT
Start: 2025-01-31 | End: 2025-01-31 | Stop reason: HOSPADM

## 2025-01-31 RX ORDER — OXYCODONE HYDROCHLORIDE 5 MG/1
5 TABLET ORAL
Status: DISCONTINUED | OUTPATIENT
Start: 2025-01-31 | End: 2025-01-31 | Stop reason: HOSPADM

## 2025-01-31 RX ORDER — LIDOCAINE HYDROCHLORIDE 20 MG/ML
INJECTION, SOLUTION EPIDURAL; INFILTRATION; INTRACAUDAL; PERINEURAL
Status: DISCONTINUED | OUTPATIENT
Start: 2025-01-31 | End: 2025-01-31 | Stop reason: SDUPTHER

## 2025-01-31 RX ORDER — FENTANYL CITRATE 50 UG/ML
INJECTION, SOLUTION INTRAMUSCULAR; INTRAVENOUS
Status: DISCONTINUED | OUTPATIENT
Start: 2025-01-31 | End: 2025-01-31 | Stop reason: SDUPTHER

## 2025-01-31 RX ORDER — SODIUM CHLORIDE 9 MG/ML
INJECTION, SOLUTION INTRAVENOUS CONTINUOUS
Status: DISCONTINUED | OUTPATIENT
Start: 2025-01-31 | End: 2025-02-02

## 2025-01-31 RX ORDER — PROPOFOL 10 MG/ML
INJECTION, EMULSION INTRAVENOUS
Status: DISCONTINUED | OUTPATIENT
Start: 2025-01-31 | End: 2025-01-31 | Stop reason: SDUPTHER

## 2025-01-31 RX ORDER — DIATRIZOATE MEGLUMINE AND DIATRIZOATE SODIUM 660; 100 MG/ML; MG/ML
80 SOLUTION ORAL; RECTAL
Status: COMPLETED | OUTPATIENT
Start: 2025-01-31 | End: 2025-01-31

## 2025-01-31 RX ORDER — IOPAMIDOL 755 MG/ML
100 INJECTION, SOLUTION INTRAVASCULAR
Status: COMPLETED | OUTPATIENT
Start: 2025-01-31 | End: 2025-01-31

## 2025-01-31 RX ORDER — ROCURONIUM BROMIDE 10 MG/ML
INJECTION, SOLUTION INTRAVENOUS
Status: DISCONTINUED | OUTPATIENT
Start: 2025-01-31 | End: 2025-01-31 | Stop reason: SDUPTHER

## 2025-01-31 RX ORDER — IOPAMIDOL 755 MG/ML
100 INJECTION, SOLUTION INTRAVASCULAR
Status: DISCONTINUED | OUTPATIENT
Start: 2025-01-31 | End: 2025-01-31

## 2025-01-31 RX ORDER — LORAZEPAM 2 MG/ML
0.5 INJECTION INTRAMUSCULAR ONCE
Status: COMPLETED | OUTPATIENT
Start: 2025-01-31 | End: 2025-01-31

## 2025-01-31 RX ORDER — SODIUM CHLORIDE, SODIUM LACTATE, POTASSIUM CHLORIDE, CALCIUM CHLORIDE 600; 310; 30; 20 MG/100ML; MG/100ML; MG/100ML; MG/100ML
INJECTION, SOLUTION INTRAVENOUS
Status: DISCONTINUED | OUTPATIENT
Start: 2025-01-31 | End: 2025-01-31 | Stop reason: SDUPTHER

## 2025-01-31 RX ORDER — ONDANSETRON 2 MG/ML
INJECTION INTRAMUSCULAR; INTRAVENOUS
Status: DISCONTINUED | OUTPATIENT
Start: 2025-01-31 | End: 2025-01-31 | Stop reason: SDUPTHER

## 2025-01-31 RX ORDER — SODIUM CHLORIDE 0.9 % (FLUSH) 0.9 %
5-40 SYRINGE (ML) INJECTION PRN
Status: DISCONTINUED | OUTPATIENT
Start: 2025-01-31 | End: 2025-01-31 | Stop reason: HOSPADM

## 2025-01-31 RX ORDER — DEXAMETHASONE SODIUM PHOSPHATE 4 MG/ML
INJECTION, SOLUTION INTRA-ARTICULAR; INTRALESIONAL; INTRAMUSCULAR; INTRAVENOUS; SOFT TISSUE
Status: DISCONTINUED | OUTPATIENT
Start: 2025-01-31 | End: 2025-01-31 | Stop reason: SDUPTHER

## 2025-01-31 RX ADMIN — ROCURONIUM BROMIDE 5 MG: 10 INJECTION INTRAVENOUS at 19:32

## 2025-01-31 RX ADMIN — FENTANYL CITRATE 50 MCG: 50 INJECTION, SOLUTION INTRAMUSCULAR; INTRAVENOUS at 20:05

## 2025-01-31 RX ADMIN — DEXTROSE MONOHYDRATE, SODIUM CHLORIDE, AND POTASSIUM CHLORIDE: 50; 4.5; 1.49 INJECTION, SOLUTION INTRAVENOUS at 02:32

## 2025-01-31 RX ADMIN — ROCURONIUM BROMIDE 30 MG: 10 INJECTION INTRAVENOUS at 19:46

## 2025-01-31 RX ADMIN — ONDANSETRON 4 MG: 2 INJECTION, SOLUTION INTRAMUSCULAR; INTRAVENOUS at 20:32

## 2025-01-31 RX ADMIN — SODIUM CHLORIDE: 9 INJECTION, SOLUTION INTRAVENOUS at 21:44

## 2025-01-31 RX ADMIN — IOPAMIDOL 100 ML: 755 INJECTION, SOLUTION INTRAVENOUS at 17:28

## 2025-01-31 RX ADMIN — Medication 80 MCG: at 20:29

## 2025-01-31 RX ADMIN — PROPOFOL 100 MG: 10 INJECTION, EMULSION INTRAVENOUS at 19:32

## 2025-01-31 RX ADMIN — Medication 140 MG: at 19:32

## 2025-01-31 RX ADMIN — HYDROMORPHONE HYDROCHLORIDE 0.25 MG: 1 INJECTION, SOLUTION INTRAMUSCULAR; INTRAVENOUS; SUBCUTANEOUS at 20:14

## 2025-01-31 RX ADMIN — LORAZEPAM 0.5 MG: 2 INJECTION INTRAMUSCULAR; INTRAVENOUS at 11:04

## 2025-01-31 RX ADMIN — PIPERACILLIN AND TAZOBACTAM 3375 MG: 3; .375 INJECTION, POWDER, LYOPHILIZED, FOR SOLUTION INTRAVENOUS at 19:43

## 2025-01-31 RX ADMIN — Medication 120 MCG: at 19:44

## 2025-01-31 RX ADMIN — SODIUM CHLORIDE, PRESERVATIVE FREE 10 ML: 5 INJECTION INTRAVENOUS at 08:10

## 2025-01-31 RX ADMIN — ONDANSETRON 4 MG: 2 INJECTION INTRAMUSCULAR; INTRAVENOUS at 01:45

## 2025-01-31 RX ADMIN — DIATRIZOATE MEGLUMINE AND DIATRIZOATE SODIUM 80 ML: 660; 100 LIQUID ORAL; RECTAL at 15:59

## 2025-01-31 RX ADMIN — PHENYLEPHRINE HYDROCHLORIDE 40 MCG/MIN: 10 INJECTION INTRAVENOUS at 20:29

## 2025-01-31 RX ADMIN — Medication 120 MCG: at 19:42

## 2025-01-31 RX ADMIN — ROCURONIUM BROMIDE 10 MG: 10 INJECTION INTRAVENOUS at 20:29

## 2025-01-31 RX ADMIN — HYDROMORPHONE HYDROCHLORIDE 0.5 MG: 1 INJECTION, SOLUTION INTRAMUSCULAR; INTRAVENOUS; SUBCUTANEOUS at 01:44

## 2025-01-31 RX ADMIN — HYDROMORPHONE HYDROCHLORIDE 0.5 MG: 1 INJECTION, SOLUTION INTRAMUSCULAR; INTRAVENOUS; SUBCUTANEOUS at 04:48

## 2025-01-31 RX ADMIN — SODIUM CHLORIDE, POTASSIUM CHLORIDE, SODIUM LACTATE AND CALCIUM CHLORIDE: 600; 310; 30; 20 INJECTION, SOLUTION INTRAVENOUS at 19:27

## 2025-01-31 RX ADMIN — DEXAMETHASONE SODIUM PHOSPHATE 8 MG: 4 INJECTION INTRA-ARTICULAR; INTRALESIONAL; INTRAMUSCULAR; INTRAVENOUS; SOFT TISSUE at 19:42

## 2025-01-31 RX ADMIN — HYDROMORPHONE HYDROCHLORIDE 0.5 MG: 1 INJECTION, SOLUTION INTRAMUSCULAR; INTRAVENOUS; SUBCUTANEOUS at 11:05

## 2025-01-31 RX ADMIN — HYDROMORPHONE HYDROCHLORIDE 0.5 MG: 1 INJECTION, SOLUTION INTRAMUSCULAR; INTRAVENOUS; SUBCUTANEOUS at 08:09

## 2025-01-31 RX ADMIN — SODIUM CHLORIDE 500 ML: 9 INJECTION, SOLUTION INTRAVENOUS at 16:58

## 2025-01-31 RX ADMIN — ROCURONIUM BROMIDE 10 MG: 10 INJECTION INTRAVENOUS at 19:59

## 2025-01-31 RX ADMIN — TOPICAL ANESTHETIC: 200 SPRAY DENTAL; PERIODONTAL at 11:04

## 2025-01-31 RX ADMIN — HYDROMORPHONE HYDROCHLORIDE 0.5 MG: 1 INJECTION, SOLUTION INTRAMUSCULAR; INTRAVENOUS; SUBCUTANEOUS at 20:58

## 2025-01-31 RX ADMIN — FENTANYL CITRATE 50 MCG: 50 INJECTION, SOLUTION INTRAMUSCULAR; INTRAVENOUS at 19:32

## 2025-01-31 RX ADMIN — HYDROMORPHONE HYDROCHLORIDE 0.5 MG: 1 INJECTION, SOLUTION INTRAMUSCULAR; INTRAVENOUS; SUBCUTANEOUS at 15:58

## 2025-01-31 RX ADMIN — LIDOCAINE HYDROCHLORIDE 100 MG: 20 INJECTION, SOLUTION EPIDURAL; INFILTRATION; INTRACAUDAL; PERINEURAL at 19:32

## 2025-01-31 RX ADMIN — ONDANSETRON 4 MG: 2 INJECTION INTRAMUSCULAR; INTRAVENOUS at 08:05

## 2025-01-31 RX ADMIN — SUGAMMADEX 200 MG: 100 INJECTION, SOLUTION INTRAVENOUS at 20:58

## 2025-01-31 RX ADMIN — HYDROMORPHONE HYDROCHLORIDE 0.25 MG: 1 INJECTION, SOLUTION INTRAMUSCULAR; INTRAVENOUS; SUBCUTANEOUS at 20:43

## 2025-01-31 ASSESSMENT — PAIN DESCRIPTION - LOCATION
LOCATION: ABDOMEN
LOCATION: ABDOMEN

## 2025-01-31 ASSESSMENT — PAIN DESCRIPTION - ORIENTATION
ORIENTATION: ANTERIOR
ORIENTATION: ANTERIOR

## 2025-01-31 ASSESSMENT — PAIN SCALES - GENERAL
PAINLEVEL_OUTOF10: 10
PAINLEVEL_OUTOF10: 9

## 2025-01-31 ASSESSMENT — PAIN DESCRIPTION - DESCRIPTORS
DESCRIPTORS: ACHING;SHARP
DESCRIPTORS: ACHING;SHARP

## 2025-01-31 NOTE — ANESTHESIA PRE PROCEDURE
Department of Anesthesiology  Preprocedure Note       Name:  Malena Avila   Age:  75 y.o.  :  1950                                          MRN:  [id number]         Date:  2025      Surgeon: Surgeon(s):  Saqib Dooley MD    Procedure: Procedure(s):  LAPAROTOMY EXPLORATORY    Medications prior to admission:   Prior to Admission medications    Medication Sig Start Date End Date Taking? Authorizing Provider   fluocinonide (LIDEX) 0.05 % external solution Apply topically 2 times daily   Yes Flynn Gonzales MD   venlafaxine (EFFEXOR) 75 MG tablet Take 1 tablet by mouth nightly   Yes ProviderFlynn MD   potassium chloride (KLOR-CON M) 10 MEQ extended release tablet Take 1 tablet by mouth daily 24  Yes Flynn Gonzales MD   furosemide (LASIX) 40 MG tablet  24  Yes Flynn Gonzales MD   omeprazole (PRILOSEC) 40 MG delayed release capsule Take 1 capsule by mouth daily 24  Yes Flynn Gonzales MD   levothyroxine (SYNTHROID) 88 MCG tablet Take 1 tablet by mouth Daily   Yes ProviderFlynn MD   amLODIPine (NORVASC) 10 MG tablet Take 1 tablet by mouth daily 2/18/15  Yes Automatic Reconciliation, Ar   aspirin 81 MG chewable tablet Take 1 tablet by mouth daily   Yes Automatic Reconciliation, Ar   carvedilol (COREG) 12.5 MG tablet Take 1 tablet by mouth 2 times daily 19  Yes Automatic Reconciliation, Ar   gabapentin (NEURONTIN) 600 MG tablet Take 2 tablets by mouth 2 times daily.   Yes Automatic Reconciliation, Ar   losartan (COZAAR) 100 MG tablet Take 1 tablet by mouth daily 2/18/15  Yes Automatic Reconciliation, Ar   Melatonin 10 MG TABS Take by mouth nightly as needed   Yes Automatic Reconciliation, Ar   meloxicam (MOBIC) 15 MG tablet Take 1 tablet by mouth daily   Yes Automatic Reconciliation, Ar   venlafaxine (EFFEXOR XR) 150 MG extended release capsule Take 1 capsule by mouth daily   Yes Automatic Reconciliation, Ar       Current medications:

## 2025-01-31 NOTE — ED NOTES
Bedside shift change report given to RUTH Barry (oncoming nurse) by RUTH Nunez (offgoing nurse). Report included the following information Nurse Handoff Report and Recent Results.

## 2025-01-31 NOTE — ED NOTES
Bedside Report    Off-Going Nurse Mayra On-Coming Nurse Asha      First and Last Name and Birthday      SBAR Completed At Bedside      Labs and Medications       Neurological Check, Cardiac Rhythm, Effort of Breathing, Chief Complaint Update from Patient, In Hospital Gown, and Non-slip Socks/footwear      Salmon N/A, Cardiac Leads (plugged in and stickers on patient's torso), Blood Pressure Cuff (correct size/plugged in/appropriate cycle intervals), Pulse Oximeter (on and working), Nasal Canula (placed correctly/connected to wall/correct flow rate), and I.V. (patent)      Fall Magnet in Place (if applicable), Floor Clear of Debris, Unused Supplies Cleared, Debris cleared from Sink/Bedside Table/Bed/Chairs, Patient Belongings in Bag with Label, Suction containers in place, White Board Updated (correct RN/Tech/Provider, Ambulation Status, Diet), and Call Youssef Within Reach      Answer Patient Qestions

## 2025-02-01 ENCOUNTER — APPOINTMENT (OUTPATIENT)
Facility: HOSPITAL | Age: 75
DRG: 329 | End: 2025-02-01
Payer: MEDICARE

## 2025-02-01 LAB
ANION GAP SERPL CALC-SCNC: 6 MMOL/L (ref 2–12)
BASOPHILS # BLD: 0 K/UL (ref 0–0.1)
BASOPHILS NFR BLD: 0 % (ref 0–1)
BUN SERPL-MCNC: 24 MG/DL (ref 6–20)
BUN/CREAT SERPL: 19 (ref 12–20)
CALCIUM SERPL-MCNC: 8.8 MG/DL (ref 8.5–10.1)
CHLORIDE SERPL-SCNC: 110 MMOL/L (ref 97–108)
CO2 SERPL-SCNC: 21 MMOL/L (ref 21–32)
CREAT SERPL-MCNC: 1.25 MG/DL (ref 0.55–1.02)
DIFFERENTIAL METHOD BLD: ABNORMAL
EOSINOPHIL # BLD: 0 K/UL (ref 0–0.4)
EOSINOPHIL NFR BLD: 0 % (ref 0–7)
ERYTHROCYTE [DISTWIDTH] IN BLOOD BY AUTOMATED COUNT: 14.5 % (ref 11.5–14.5)
GLUCOSE BLD STRIP.AUTO-MCNC: 103 MG/DL (ref 65–117)
GLUCOSE BLD STRIP.AUTO-MCNC: 97 MG/DL (ref 65–117)
GLUCOSE SERPL-MCNC: 103 MG/DL (ref 65–100)
HCT VFR BLD AUTO: 38.6 % (ref 35–47)
HGB BLD-MCNC: 13.1 G/DL (ref 11.5–16)
IMM GRANULOCYTES # BLD AUTO: 0 K/UL (ref 0–0.04)
IMM GRANULOCYTES NFR BLD AUTO: 0 % (ref 0–0.5)
LYMPHOCYTES # BLD: 0.86 K/UL (ref 0.8–3.5)
LYMPHOCYTES NFR BLD: 8 % (ref 12–49)
MCH RBC QN AUTO: 29.4 PG (ref 26–34)
MCHC RBC AUTO-ENTMCNC: 33.9 G/DL (ref 30–36.5)
MCV RBC AUTO: 86.5 FL (ref 80–99)
METAMYELOCYTES NFR BLD MANUAL: 1 %
MONOCYTES # BLD: 0.43 K/UL (ref 0–1)
MONOCYTES NFR BLD: 4 % (ref 5–13)
NEUTS BAND NFR BLD MANUAL: 21 %
NEUTS SEG # BLD: 9.31 K/UL (ref 1.8–8)
NEUTS SEG NFR BLD: 66 % (ref 32–75)
NRBC # BLD: 0 K/UL (ref 0–0.01)
NRBC BLD-RTO: 0 PER 100 WBC
PLATELET # BLD AUTO: 207 K/UL (ref 150–400)
PMV BLD AUTO: 10.4 FL (ref 8.9–12.9)
POTASSIUM SERPL-SCNC: 5.3 MMOL/L (ref 3.5–5.1)
RBC # BLD AUTO: 4.46 M/UL (ref 3.8–5.2)
RBC MORPH BLD: ABNORMAL
SERVICE CMNT-IMP: NORMAL
SERVICE CMNT-IMP: NORMAL
SODIUM SERPL-SCNC: 137 MMOL/L (ref 136–145)
WBC # BLD AUTO: 10.7 K/UL (ref 3.6–11)

## 2025-02-01 PROCEDURE — 74018 RADEX ABDOMEN 1 VIEW: CPT

## 2025-02-01 PROCEDURE — 6360000002 HC RX W HCPCS: Performed by: SURGERY

## 2025-02-01 PROCEDURE — 36415 COLL VENOUS BLD VENIPUNCTURE: CPT

## 2025-02-01 PROCEDURE — 2580000003 HC RX 258: Performed by: SURGERY

## 2025-02-01 PROCEDURE — 1100000000 HC RM PRIVATE

## 2025-02-01 PROCEDURE — 82962 GLUCOSE BLOOD TEST: CPT

## 2025-02-01 PROCEDURE — 0DT80ZZ RESECTION OF SMALL INTESTINE, OPEN APPROACH: ICD-10-PCS | Performed by: SURGERY

## 2025-02-01 PROCEDURE — 80048 BASIC METABOLIC PNL TOTAL CA: CPT

## 2025-02-01 PROCEDURE — 94760 N-INVAS EAR/PLS OXIMETRY 1: CPT

## 2025-02-01 PROCEDURE — 2700000000 HC OXYGEN THERAPY PER DAY

## 2025-02-01 PROCEDURE — 85025 COMPLETE CBC W/AUTO DIFF WBC: CPT

## 2025-02-01 RX ORDER — HYDROMORPHONE HYDROCHLORIDE 1 MG/ML
0.5 INJECTION, SOLUTION INTRAMUSCULAR; INTRAVENOUS; SUBCUTANEOUS
Status: DISCONTINUED | OUTPATIENT
Start: 2025-02-01 | End: 2025-02-01

## 2025-02-01 RX ADMIN — SODIUM CHLORIDE: 9 INJECTION, SOLUTION INTRAVENOUS at 12:55

## 2025-02-01 RX ADMIN — HYDROMORPHONE HYDROCHLORIDE 0.5 MG: 1 INJECTION, SOLUTION INTRAMUSCULAR; INTRAVENOUS; SUBCUTANEOUS at 03:02

## 2025-02-01 RX ADMIN — ONDANSETRON 4 MG: 2 INJECTION INTRAMUSCULAR; INTRAVENOUS at 11:30

## 2025-02-01 RX ADMIN — HYDROMORPHONE HYDROCHLORIDE 0.5 MG: 1 INJECTION, SOLUTION INTRAMUSCULAR; INTRAVENOUS; SUBCUTANEOUS at 12:56

## 2025-02-01 RX ADMIN — HYDROMORPHONE HYDROCHLORIDE 0.5 MG: 1 INJECTION, SOLUTION INTRAMUSCULAR; INTRAVENOUS; SUBCUTANEOUS at 06:52

## 2025-02-01 RX ADMIN — HYDROMORPHONE HYDROCHLORIDE 0.5 MG: 1 INJECTION, SOLUTION INTRAMUSCULAR; INTRAVENOUS; SUBCUTANEOUS at 18:25

## 2025-02-01 RX ADMIN — SODIUM CHLORIDE: 9 INJECTION, SOLUTION INTRAVENOUS at 06:29

## 2025-02-01 ASSESSMENT — PAIN - FUNCTIONAL ASSESSMENT
PAIN_FUNCTIONAL_ASSESSMENT: PREVENTS OR INTERFERES SOME ACTIVE ACTIVITIES AND ADLS
PAIN_FUNCTIONAL_ASSESSMENT: PREVENTS OR INTERFERES SOME ACTIVE ACTIVITIES AND ADLS

## 2025-02-01 ASSESSMENT — PAIN SCALES - PAIN ASSESSMENT IN ADVANCED DEMENTIA (PAINAD)
TOTALSCORE: 3
BREATHING: OCCASIONAL LABORED BREATHING, SHORT PERIOD OF HYPERVENTILATION
BODYLANGUAGE: RELAXED
NEGVOCALIZATION: OCCASIONAL MOAN/GROAN, LOW SPEECH, NEGATIVE/DISAPPROVING QUALITY
CONSOLABILITY: DISTRACTED OR REASSURED BY VOICE/TOUCH
FACIALEXPRESSION: SMILING OR INEXPRESSIVE

## 2025-02-01 ASSESSMENT — PAIN DESCRIPTION - DESCRIPTORS
DESCRIPTORS: ACHING;SORE
DESCRIPTORS: ACHING

## 2025-02-01 ASSESSMENT — PAIN SCALES - GENERAL
PAINLEVEL_OUTOF10: 7
PAINLEVEL_OUTOF10: 8
PAINLEVEL_OUTOF10: 9
PAINLEVEL_OUTOF10: 9

## 2025-02-01 ASSESSMENT — PAIN DESCRIPTION - ORIENTATION
ORIENTATION: RIGHT
ORIENTATION: RIGHT

## 2025-02-01 ASSESSMENT — PAIN DESCRIPTION - LOCATION
LOCATION: ABDOMEN
LOCATION: ABDOMEN;INCISION
LOCATION: ABDOMEN;INCISION
LOCATION: ABDOMEN

## 2025-02-01 NOTE — FLOWSHEET NOTE
01/31/25 2105   Handoff   Communication Given Periop Handoff/Relief   Handoff phase Phase I receiving   Handoff Given To Mingo Kidd RN   Handoff Received From June Waters RN   Handoff Communication Face to Face;At bedside   Time Handoff Given 2105

## 2025-02-01 NOTE — CARE COORDINATION
Care Management Initial Assessment       RUR: 13%  Readmission? No  1st IM letter given? Yes -   1st  letter given: No     CM completed assessment w/pt & daughter at bedside.  Has had HH in the past, but unable to recall name of provider.  No DME use.  Patient is independent w/ADL to include driving.   Patient has two supportive children.  A daughter in [facility] & a son who lives close.  Patient uses [facility] [address]  No needs or concerns identified at this time.  Daughter will transport at d/c       02/01/25 1406   Service Assessment   Patient Orientation Alert and Oriented   Cognition Alert  (sleepy)   History Provided By Patient;Child/Family  (daughter)   Primary Caregiver Self   Support Systems Children;Friends/Neighbors  (daughter, son)   PCP Verified by CM Yes   Last Visit to PCP Within last 3 months   Prior Functional Level Independent in ADLs/IADLs   Current Functional Level Independent in ADLs/IADLs   Can patient return to prior living arrangement Yes   Family able to assist with home care needs: Yes   Would you like for me to discuss the discharge plan with any other family members/significant others, and if so, who? Yes  (daughter)   Financial Resources Medicare   Community Resources None   Social/Functional History   Lives With Alone   Type of Home House  (one story home w/4 STE)   Home Equipment None   Active  Yes   Discharge Planning   Patient expects to be discharged to: House   Services At/After Discharge   Transition of Care Consult (CM Consult) N/A   Services At/After Discharge None   Mode of Transport at Discharge Other (see comment)  (daughter)     Annabella Bello  Ext [phone]

## 2025-02-01 NOTE — OP NOTE
[facility]              [address] [place]  [address]                            OPERATIVE REPORT      PATIENT NAME: PRAVEEN HARDIN           : 1950  MED REC NO: [id number]                       ROOM:   ACCOUNT NO: [id number]                       ADMIT DATE: 2025  PROVIDER: Saqib Dooley MD    DATE OF SERVICE:  2025    PREOPERATIVE DIAGNOSES:  Small bowel obstruction with a closed loop and signs of ischemia on CT scan.    POSTOPERATIVE DIAGNOSES:  Closed-loop obstruction with jejunum infarction.    PROCEDURES PERFORMED:  Exploratory laparotomy with small-bowel resection and primary stapled anastomosis.    SURGEON:  Saqib Dooley MD    ASSISTANT:  Surgical first assist is Kd Rivera.  There is no assistant surgeon.    ANESTHESIA:  General endotracheal anesthesia by Dr. Paulino Jorgensen.    ESTIMATED BLOOD LOSS:  Minimal, less than 50 mL.    SPECIMENS REMOVED:  Mid distal jejunum.  A total of 88 cm with associated mesentery.    INTRAOPERATIVE FINDINGS:  Internal hernia secondary to the thin filmy adhesions in the right upper quadrant causing complete infarction of the mid-distal jejunum.  Additional findings are an easily reducible paraesophageal hernia containing a portion of transverse colon as well as the proximal stomach.  There are no implants.     COMPLICATIONS:  There were no complications.    IMPLANTS:  none    INDICATIONS:  Closed-loop obstruction with jejunum infarction    DESCRIPTION OF PROCEDURE:  After verbal consent was obtained, the patient was brought to the operating room, made comfortable in supine position.  Administered general endotracheal anesthesia.  A Salmon catheter was placed and the patient was prepped and draped in standard fashion.  A time-out was completed.  When this was done, a midline incision was made on either side of the umbilicus.  This was extended down through the midline fascia and the abdomen was explored

## 2025-02-01 NOTE — BRIEF OP NOTE
Brief Postoperative Note      Patient: Malena Avila  YOB: 1950  MRN: [id number]    Date of Procedure: 1/31/2025    Pre-Op Diagnosis Codes:      * SBO (small bowel obstruction) ([facility]) [K56.609]    Post-Op Diagnosis: closed loop obstruction with 88 cm infarcted jejunum       Procedure(s):  LAPAROTOMY EXPLORATORY, SMALL BOWEL RESECTION    Surgeon(s):  Saqib Doolye MD    Assistant:  Surgical Assistant: Kd Rivera    Anesthesia: General    Estimated Blood Loss (mL): less than 50     Complications: None    Specimens:   ID Type Source Tests Collected by Time Destination   1 : JEJUNUM Tissue Jejunum SURGICAL PATHOLOGY Saqib Dooley MD 1/31/2025 2010        Implants:  * No implants in log *      Drains:   NG/OG/NJ/NE Tube Nasogastric 16 fr Left nostril (Active)   Surrounding Skin Clean, dry & intact 01/31/25 1626   Securement device Adhesive based bedoya 01/31/25 1626   Status Suction-low continuous 01/31/25 1626   Placement Verified Gastric Contents;Respiratory Status;X-Ray (Initial) 01/31/25 1626   NG/OG/NJ/NE External Measurement (cm) 55 cm 01/31/25 1626   Drainage Appearance Brown 01/31/25 1900   Output (mL) 425 ml 01/31/25 1900       Urinary Catheter 01/31/25 Salmon (Active)       Findings:  Infection Present At Time Of Surgery (PATOS) (choose all levels that have infection present):  - Organ Space infection (below fascia) present as evidenced by fluid consistent with infection  Other Findings: 88 cm mid small bowel infarction secondary to internal hernia with closed loop obstruction, large easily reducible hiatal hernia without evidence of ischemia    Electronically signed by Saqib Dooley MD on 1/31/2025 at 8:45 PM

## 2025-02-01 NOTE — ANESTHESIA POSTPROCEDURE EVALUATION
Department of Anesthesiology  Postprocedure Note    Patient: Malena Avila  MRN: [id number]  YOB: 1950  Date of evaluation: 1/31/2025    Procedure Summary       Date: 01/31/25 Room / Location: [facility] MAIN OR M3 / [facility] MAIN OR    Anesthesia Start: 1927 Anesthesia Stop: 2106    Procedure: LAPAROTOMY EXPLORATORY (Abdomen) Diagnosis:       SBO (small bowel obstruction) (HCC)      (SBO (small bowel obstruction) (HCC) [K56.609])    Providers: Saqib Dooley MD Responsible Provider: Paulino Jorgensen MD    Anesthesia Type: General ASA Status: 3            Anesthesia Type: General    Corey Phase I:      Corey Phase II:      Anesthesia Post Evaluation    Patient location during evaluation: PACU  Patient participation: complete - patient participated  Level of consciousness: sleepy but conscious  Airway patency: patent  Nausea & Vomiting: no nausea and no vomiting  Cardiovascular status: hemodynamically stable  Respiratory status: acceptable and nasal cannula  Hydration status: stable  Multimodal analgesia pain management approach    No notable events documented.

## 2025-02-01 NOTE — ANESTHESIA POST-OP
TRANSFER - OUT REPORT:    Verbal report given to Heidi MIRANDA (name) on [facility]  being transferred to [facility](unit) for routine post-op       Report consisted of patient’s Situation, Background, Assessment and   Recommendations(SBAR).     Information from the following report(s) Surgery Report, Intake/Output, MAR, Recent Results, and Cardiac Rhythm ST  was reviewed with the receiving nurse.    Opportunity for questions and clarification was provided.      Patient transported with:   Monitor  O2 @ 4 liters  Registered Nurse

## 2025-02-02 LAB
ANION GAP SERPL CALC-SCNC: 5 MMOL/L (ref 2–12)
BASOPHILS # BLD: 0 K/UL (ref 0–0.1)
BASOPHILS NFR BLD: 0 % (ref 0–1)
BUN SERPL-MCNC: 20 MG/DL (ref 6–20)
BUN/CREAT SERPL: 27 (ref 12–20)
CALCIUM SERPL-MCNC: 8.9 MG/DL (ref 8.5–10.1)
CHLORIDE SERPL-SCNC: 111 MMOL/L (ref 97–108)
CO2 SERPL-SCNC: 24 MMOL/L (ref 21–32)
CREAT SERPL-MCNC: 0.75 MG/DL (ref 0.55–1.02)
DIFFERENTIAL METHOD BLD: ABNORMAL
EOSINOPHIL # BLD: 0 K/UL (ref 0–0.4)
EOSINOPHIL NFR BLD: 0 % (ref 0–7)
ERYTHROCYTE [DISTWIDTH] IN BLOOD BY AUTOMATED COUNT: 14.7 % (ref 11.5–14.5)
GLUCOSE BLD STRIP.AUTO-MCNC: 98 MG/DL (ref 65–117)
GLUCOSE SERPL-MCNC: 74 MG/DL (ref 65–100)
HCT VFR BLD AUTO: 35.3 % (ref 35–47)
HGB BLD-MCNC: 11.3 G/DL (ref 11.5–16)
IMM GRANULOCYTES # BLD AUTO: 0 K/UL (ref 0–0.04)
IMM GRANULOCYTES NFR BLD AUTO: 0 % (ref 0–0.5)
LYMPHOCYTES # BLD: 0.56 K/UL (ref 0.8–3.5)
LYMPHOCYTES NFR BLD: 8 % (ref 12–49)
MCH RBC QN AUTO: 29.5 PG (ref 26–34)
MCHC RBC AUTO-ENTMCNC: 32 G/DL (ref 30–36.5)
MCV RBC AUTO: 92.2 FL (ref 80–99)
MONOCYTES # BLD: 0.28 K/UL (ref 0–1)
MONOCYTES NFR BLD: 4 % (ref 5–13)
NEUTS BAND NFR BLD MANUAL: 2 %
NEUTS SEG # BLD: 6.16 K/UL (ref 1.8–8)
NEUTS SEG NFR BLD: 86 % (ref 32–75)
NRBC # BLD: 0 K/UL (ref 0–0.01)
NRBC BLD-RTO: 0 PER 100 WBC
PLATELET # BLD AUTO: 145 K/UL (ref 150–400)
PMV BLD AUTO: 10.3 FL (ref 8.9–12.9)
POTASSIUM SERPL-SCNC: 3.9 MMOL/L (ref 3.5–5.1)
RBC # BLD AUTO: 3.83 M/UL (ref 3.8–5.2)
RBC MORPH BLD: ABNORMAL
SERVICE CMNT-IMP: NORMAL
SODIUM SERPL-SCNC: 140 MMOL/L (ref 136–145)
WBC # BLD AUTO: 7 K/UL (ref 3.6–11)

## 2025-02-02 PROCEDURE — 36415 COLL VENOUS BLD VENIPUNCTURE: CPT

## 2025-02-02 PROCEDURE — 2500000003 HC RX 250 WO HCPCS: Performed by: SURGERY

## 2025-02-02 PROCEDURE — 1100000000 HC RM PRIVATE

## 2025-02-02 PROCEDURE — 2700000000 HC OXYGEN THERAPY PER DAY

## 2025-02-02 PROCEDURE — 2580000003 HC RX 258: Performed by: SURGERY

## 2025-02-02 PROCEDURE — 94760 N-INVAS EAR/PLS OXIMETRY 1: CPT

## 2025-02-02 PROCEDURE — 85025 COMPLETE CBC W/AUTO DIFF WBC: CPT

## 2025-02-02 PROCEDURE — 82962 GLUCOSE BLOOD TEST: CPT

## 2025-02-02 PROCEDURE — 80048 BASIC METABOLIC PNL TOTAL CA: CPT

## 2025-02-02 PROCEDURE — 6360000002 HC RX W HCPCS: Performed by: SURGERY

## 2025-02-02 RX ORDER — DEXTROSE MONOHYDRATE, SODIUM CHLORIDE, AND POTASSIUM CHLORIDE 50; 1.49; 4.5 G/1000ML; G/1000ML; G/1000ML
INJECTION, SOLUTION INTRAVENOUS CONTINUOUS
Status: DISCONTINUED | OUTPATIENT
Start: 2025-02-02 | End: 2025-02-03

## 2025-02-02 RX ORDER — LORAZEPAM 2 MG/ML
1 INJECTION INTRAMUSCULAR ONCE
Status: COMPLETED | OUTPATIENT
Start: 2025-02-02 | End: 2025-02-02

## 2025-02-02 RX ORDER — ENOXAPARIN SODIUM 100 MG/ML
40 INJECTION SUBCUTANEOUS DAILY
Status: DISCONTINUED | OUTPATIENT
Start: 2025-02-02 | End: 2025-02-10 | Stop reason: HOSPADM

## 2025-02-02 RX ADMIN — SODIUM CHLORIDE, PRESERVATIVE FREE 10 ML: 5 INJECTION INTRAVENOUS at 01:24

## 2025-02-02 RX ADMIN — ENOXAPARIN SODIUM 40 MG: 100 INJECTION SUBCUTANEOUS at 09:46

## 2025-02-02 RX ADMIN — DEXTROSE MONOHYDRATE, SODIUM CHLORIDE, AND POTASSIUM CHLORIDE: 50; 4.5; 1.49 INJECTION, SOLUTION INTRAVENOUS at 09:52

## 2025-02-02 RX ADMIN — SODIUM CHLORIDE 40 MG: 9 INJECTION INTRAMUSCULAR; INTRAVENOUS; SUBCUTANEOUS at 09:45

## 2025-02-02 RX ADMIN — LORAZEPAM 1 MG: 2 INJECTION INTRAMUSCULAR; INTRAVENOUS at 01:24

## 2025-02-02 RX ADMIN — HYDROMORPHONE HYDROCHLORIDE 0.5 MG: 1 INJECTION, SOLUTION INTRAMUSCULAR; INTRAVENOUS; SUBCUTANEOUS at 06:08

## 2025-02-02 ASSESSMENT — PAIN DESCRIPTION - DESCRIPTORS: DESCRIPTORS: PATIENT UNABLE TO DESCRIBE

## 2025-02-02 ASSESSMENT — PAIN - FUNCTIONAL ASSESSMENT: PAIN_FUNCTIONAL_ASSESSMENT: PREVENTS OR INTERFERES SOME ACTIVE ACTIVITIES AND ADLS

## 2025-02-02 ASSESSMENT — PAIN DESCRIPTION - ORIENTATION: ORIENTATION: LOWER

## 2025-02-02 ASSESSMENT — PAIN SCALES - GENERAL
PAINLEVEL_OUTOF10: 6
PAINLEVEL_OUTOF10: 0
PAINLEVEL_OUTOF10: 0

## 2025-02-02 ASSESSMENT — PAIN DESCRIPTION - LOCATION: LOCATION: ABDOMEN

## 2025-02-03 LAB
APPEARANCE UR: CLEAR
BACTERIA URNS QL MICRO: NEGATIVE /HPF
BILIRUB UR QL: NEGATIVE
COLOR UR: ABNORMAL
EPITH CASTS URNS QL MICRO: ABNORMAL /LPF
GLUCOSE UR STRIP.AUTO-MCNC: NEGATIVE MG/DL
HGB UR QL STRIP: ABNORMAL
HYALINE CASTS URNS QL MICRO: ABNORMAL /LPF (ref 0–2)
KETONES UR QL STRIP.AUTO: NEGATIVE MG/DL
LEUKOCYTE ESTERASE UR QL STRIP.AUTO: ABNORMAL
NITRITE UR QL STRIP.AUTO: NEGATIVE
PH UR STRIP: 6 (ref 5–8)
PROT UR STRIP-MCNC: 30 MG/DL
RBC #/AREA URNS HPF: ABNORMAL /HPF (ref 0–5)
SP GR UR REFRACTOMETRY: 1.01
UROBILINOGEN UR QL STRIP.AUTO: 1 EU/DL (ref 0.2–1)
WBC URNS QL MICRO: ABNORMAL /HPF (ref 0–4)

## 2025-02-03 PROCEDURE — 6360000002 HC RX W HCPCS: Performed by: SURGERY

## 2025-02-03 PROCEDURE — 76937 US GUIDE VASCULAR ACCESS: CPT

## 2025-02-03 PROCEDURE — 6360000002 HC RX W HCPCS: Performed by: NURSE PRACTITIONER

## 2025-02-03 PROCEDURE — 02HV33Z INSERTION OF INFUSION DEVICE INTO SUPERIOR VENA CAVA, PERCUTANEOUS APPROACH: ICD-10-PCS | Performed by: SURGERY

## 2025-02-03 PROCEDURE — 1100000000 HC RM PRIVATE

## 2025-02-03 PROCEDURE — C1751 CATH, INF, PER/CENT/MIDLINE: HCPCS

## 2025-02-03 PROCEDURE — 2580000003 HC RX 258: Performed by: SURGERY

## 2025-02-03 PROCEDURE — 87086 URINE CULTURE/COLONY COUNT: CPT

## 2025-02-03 PROCEDURE — 94760 N-INVAS EAR/PLS OXIMETRY 1: CPT

## 2025-02-03 PROCEDURE — 2700000000 HC OXYGEN THERAPY PER DAY

## 2025-02-03 PROCEDURE — 2500000003 HC RX 250 WO HCPCS: Performed by: SURGERY

## 2025-02-03 PROCEDURE — 36568 INSJ PICC <5 YR W/O IMAGING: CPT

## 2025-02-03 PROCEDURE — 81001 URINALYSIS AUTO W/SCOPE: CPT

## 2025-02-03 RX ORDER — PROCHLORPERAZINE EDISYLATE 5 MG/ML
10 INJECTION INTRAMUSCULAR; INTRAVENOUS ONCE
Status: COMPLETED | OUTPATIENT
Start: 2025-02-03 | End: 2025-02-03

## 2025-02-03 RX ORDER — DEXTROSE MONOHYDRATE, SODIUM CHLORIDE, AND POTASSIUM CHLORIDE 50; 1.49; 4.5 G/1000ML; G/1000ML; G/1000ML
INJECTION, SOLUTION INTRAVENOUS CONTINUOUS
Status: ACTIVE | OUTPATIENT
Start: 2025-02-03 | End: 2025-02-03

## 2025-02-03 RX ORDER — INSULIN LISPRO 100 [IU]/ML
0-4 INJECTION, SOLUTION INTRAVENOUS; SUBCUTANEOUS EVERY 6 HOURS
Status: DISCONTINUED | OUTPATIENT
Start: 2025-02-04 | End: 2025-02-09

## 2025-02-03 RX ORDER — DEXTROSE MONOHYDRATE, SODIUM CHLORIDE, AND POTASSIUM CHLORIDE 50; 1.49; 4.5 G/1000ML; G/1000ML; G/1000ML
INJECTION, SOLUTION INTRAVENOUS CONTINUOUS
Status: DISCONTINUED | OUTPATIENT
Start: 2025-02-03 | End: 2025-02-08

## 2025-02-03 RX ORDER — LORAZEPAM 2 MG/ML
0.5 INJECTION INTRAMUSCULAR EVERY 6 HOURS PRN
Status: DISCONTINUED | OUTPATIENT
Start: 2025-02-03 | End: 2025-02-05

## 2025-02-03 RX ADMIN — ENOXAPARIN SODIUM 40 MG: 100 INJECTION SUBCUTANEOUS at 09:23

## 2025-02-03 RX ADMIN — DEXTROSE MONOHYDRATE, SODIUM CHLORIDE, AND POTASSIUM CHLORIDE: 50; 4.5; 1.49 INJECTION, SOLUTION INTRAVENOUS at 06:56

## 2025-02-03 RX ADMIN — SODIUM CHLORIDE 40 MG: 9 INJECTION INTRAMUSCULAR; INTRAVENOUS; SUBCUTANEOUS at 09:23

## 2025-02-03 RX ADMIN — ONDANSETRON 4 MG: 2 INJECTION INTRAMUSCULAR; INTRAVENOUS at 00:45

## 2025-02-03 RX ADMIN — CALCIUM GLUCONATE: 98 INJECTION, SOLUTION INTRAVENOUS at 18:04

## 2025-02-03 RX ADMIN — PROCHLORPERAZINE EDISYLATE 10 MG: 5 INJECTION INTRAMUSCULAR; INTRAVENOUS at 05:04

## 2025-02-03 RX ADMIN — LORAZEPAM 0.5 MG: 2 INJECTION INTRAMUSCULAR; INTRAVENOUS at 14:37

## 2025-02-03 RX ADMIN — HYDROMORPHONE HYDROCHLORIDE 0.5 MG: 1 INJECTION, SOLUTION INTRAMUSCULAR; INTRAVENOUS; SUBCUTANEOUS at 22:11

## 2025-02-03 ASSESSMENT — PAIN DESCRIPTION - ORIENTATION: ORIENTATION: OTHER (COMMENT)

## 2025-02-03 ASSESSMENT — PAIN SCALES - GENERAL
PAINLEVEL_OUTOF10: 0
PAINLEVEL_OUTOF10: 8
PAINLEVEL_OUTOF10: 0

## 2025-02-03 ASSESSMENT — PAIN DESCRIPTION - LOCATION: LOCATION: GENERALIZED

## 2025-02-03 ASSESSMENT — PAIN - FUNCTIONAL ASSESSMENT: PAIN_FUNCTIONAL_ASSESSMENT: PREVENTS OR INTERFERES SOME ACTIVE ACTIVITIES AND ADLS

## 2025-02-03 ASSESSMENT — PAIN DESCRIPTION - DESCRIPTORS: DESCRIPTORS: ACHING

## 2025-02-04 LAB
BACTERIA SPEC CULT: NORMAL
CC UR VC: NORMAL
GLUCOSE BLD STRIP.AUTO-MCNC: 108 MG/DL (ref 65–117)
GLUCOSE BLD STRIP.AUTO-MCNC: 111 MG/DL (ref 65–117)
GLUCOSE BLD STRIP.AUTO-MCNC: 131 MG/DL (ref 65–117)
GLUCOSE BLD STRIP.AUTO-MCNC: 98 MG/DL (ref 65–117)
MAGNESIUM SERPL-MCNC: 2 MG/DL (ref 1.6–2.4)
PHOSPHATE SERPL-MCNC: 1.7 MG/DL (ref 2.6–4.7)
SERVICE CMNT-IMP: ABNORMAL
SERVICE CMNT-IMP: NORMAL

## 2025-02-04 PROCEDURE — 6360000002 HC RX W HCPCS: Performed by: NURSE PRACTITIONER

## 2025-02-04 PROCEDURE — 82962 GLUCOSE BLOOD TEST: CPT

## 2025-02-04 PROCEDURE — 2500000003 HC RX 250 WO HCPCS: Performed by: SURGERY

## 2025-02-04 PROCEDURE — 2580000003 HC RX 258: Performed by: SURGERY

## 2025-02-04 PROCEDURE — 1100000000 HC RM PRIVATE

## 2025-02-04 PROCEDURE — 6360000002 HC RX W HCPCS: Performed by: SURGERY

## 2025-02-04 PROCEDURE — 83735 ASSAY OF MAGNESIUM: CPT

## 2025-02-04 PROCEDURE — 2700000000 HC OXYGEN THERAPY PER DAY

## 2025-02-04 PROCEDURE — 36415 COLL VENOUS BLD VENIPUNCTURE: CPT

## 2025-02-04 PROCEDURE — 94760 N-INVAS EAR/PLS OXIMETRY 1: CPT

## 2025-02-04 PROCEDURE — 84100 ASSAY OF PHOSPHORUS: CPT

## 2025-02-04 RX ADMIN — ENOXAPARIN SODIUM 40 MG: 100 INJECTION SUBCUTANEOUS at 08:35

## 2025-02-04 RX ADMIN — LORAZEPAM 0.5 MG: 2 INJECTION INTRAMUSCULAR; INTRAVENOUS at 11:31

## 2025-02-04 RX ADMIN — ONDANSETRON 4 MG: 2 INJECTION INTRAMUSCULAR; INTRAVENOUS at 04:48

## 2025-02-04 RX ADMIN — CALCIUM GLUCONATE: 98 INJECTION, SOLUTION INTRAVENOUS at 19:01

## 2025-02-04 RX ADMIN — DEXTROSE MONOHYDRATE, SODIUM CHLORIDE, AND POTASSIUM CHLORIDE: 50; 4.5; 1.49 INJECTION, SOLUTION INTRAVENOUS at 05:00

## 2025-02-04 RX ADMIN — SODIUM CHLORIDE 40 MG: 9 INJECTION INTRAMUSCULAR; INTRAVENOUS; SUBCUTANEOUS at 08:34

## 2025-02-04 RX ADMIN — HYDROMORPHONE HYDROCHLORIDE 0.25 MG: 1 INJECTION, SOLUTION INTRAMUSCULAR; INTRAVENOUS; SUBCUTANEOUS at 15:00

## 2025-02-04 RX ADMIN — HYDROMORPHONE HYDROCHLORIDE 0.5 MG: 1 INJECTION, SOLUTION INTRAMUSCULAR; INTRAVENOUS; SUBCUTANEOUS at 05:06

## 2025-02-04 ASSESSMENT — PAIN DESCRIPTION - LOCATION
LOCATION: ABDOMEN
LOCATION: BACK

## 2025-02-04 ASSESSMENT — PAIN SCALES - GENERAL
PAINLEVEL_OUTOF10: 6
PAINLEVEL_OUTOF10: 0
PAINLEVEL_OUTOF10: 7

## 2025-02-04 ASSESSMENT — PAIN DESCRIPTION - ORIENTATION
ORIENTATION: ANTERIOR
ORIENTATION: LOWER

## 2025-02-04 ASSESSMENT — PAIN DESCRIPTION - DESCRIPTORS
DESCRIPTORS: ACHING
DESCRIPTORS: ACHING

## 2025-02-04 NOTE — CARE COORDINATION
Transition of Care Plan:    RUR: 14% Moderate Risk  Prior Level of Functioning: Independent with ADLs  Disposition: Home  ROLANDO: 2/6  Follow up appointments: PCP follow up  DME needed: N/A  Transportation at discharge: Daughter to transport  IM/IMM Medicare/ letter given: 2 IM to be given  Is patient a [place] and connected with [facility]? N/A  Caregiver Contact:   Ana Avila (Child)  [phone] (Home Phone)  Discharge Caregiver contacted prior to discharge? Yes by pt  Care Conference needed? N/A  Barriers to discharge:  Surg Clearance    Initial Note: Chart Reviewed: Pt pending surg clearance for d/c. Daughter to transport for discharge. CM will continue to follow.      BO Valle,  [phone]

## 2025-02-05 LAB
ANION GAP SERPL CALC-SCNC: 4 MMOL/L (ref 2–12)
BASOPHILS # BLD: 0 K/UL (ref 0–0.1)
BASOPHILS NFR BLD: 0 % (ref 0–1)
BUN SERPL-MCNC: 7 MG/DL (ref 6–20)
BUN/CREAT SERPL: 15 (ref 12–20)
CALCIUM SERPL-MCNC: 8 MG/DL (ref 8.5–10.1)
CHLORIDE SERPL-SCNC: 105 MMOL/L (ref 97–108)
CO2 SERPL-SCNC: 29 MMOL/L (ref 21–32)
CREAT SERPL-MCNC: 0.46 MG/DL (ref 0.55–1.02)
DIFFERENTIAL METHOD BLD: ABNORMAL
EOSINOPHIL # BLD: 0 K/UL (ref 0–0.4)
EOSINOPHIL NFR BLD: 0 % (ref 0–7)
ERYTHROCYTE [DISTWIDTH] IN BLOOD BY AUTOMATED COUNT: 13.9 % (ref 11.5–14.5)
GLUCOSE BLD STRIP.AUTO-MCNC: 100 MG/DL (ref 65–117)
GLUCOSE BLD STRIP.AUTO-MCNC: 100 MG/DL (ref 65–117)
GLUCOSE BLD STRIP.AUTO-MCNC: 106 MG/DL (ref 65–117)
GLUCOSE BLD STRIP.AUTO-MCNC: 107 MG/DL (ref 65–117)
GLUCOSE BLD STRIP.AUTO-MCNC: 116 MG/DL (ref 65–117)
GLUCOSE BLD STRIP.AUTO-MCNC: 136 MG/DL (ref 65–117)
GLUCOSE SERPL-MCNC: 109 MG/DL (ref 65–100)
HCT VFR BLD AUTO: 29.3 % (ref 35–47)
HGB BLD-MCNC: 9.8 G/DL (ref 11.5–16)
IMM GRANULOCYTES # BLD AUTO: 0 K/UL (ref 0–0.04)
IMM GRANULOCYTES NFR BLD AUTO: 0 % (ref 0–0.5)
LYMPHOCYTES # BLD: 0.55 K/UL (ref 0.8–3.5)
LYMPHOCYTES NFR BLD: 14 % (ref 12–49)
MAGNESIUM SERPL-MCNC: 1.9 MG/DL (ref 1.6–2.4)
MCH RBC QN AUTO: 29.2 PG (ref 26–34)
MCHC RBC AUTO-ENTMCNC: 33.4 G/DL (ref 30–36.5)
MCV RBC AUTO: 87.2 FL (ref 80–99)
METAMYELOCYTES NFR BLD MANUAL: 3 %
MONOCYTES # BLD: 0.16 K/UL (ref 0–1)
MONOCYTES NFR BLD: 4 % (ref 5–13)
NEUTS BAND NFR BLD MANUAL: 3 %
NEUTS SEG # BLD: 3.08 K/UL (ref 1.8–8)
NEUTS SEG NFR BLD: 76 % (ref 32–75)
NRBC # BLD: 0 K/UL (ref 0–0.01)
NRBC BLD-RTO: 0 PER 100 WBC
PHOSPHATE SERPL-MCNC: 2.7 MG/DL (ref 2.6–4.7)
PLATELET # BLD AUTO: 141 K/UL (ref 150–400)
PMV BLD AUTO: 9.9 FL (ref 8.9–12.9)
POTASSIUM SERPL-SCNC: 3.3 MMOL/L (ref 3.5–5.1)
RBC # BLD AUTO: 3.36 M/UL (ref 3.8–5.2)
RBC MORPH BLD: ABNORMAL
SERVICE CMNT-IMP: ABNORMAL
SERVICE CMNT-IMP: NORMAL
SODIUM SERPL-SCNC: 138 MMOL/L (ref 136–145)
WBC # BLD AUTO: 3.9 K/UL (ref 3.6–11)

## 2025-02-05 PROCEDURE — 85025 COMPLETE CBC W/AUTO DIFF WBC: CPT

## 2025-02-05 PROCEDURE — 6360000002 HC RX W HCPCS: Performed by: SURGERY

## 2025-02-05 PROCEDURE — 2700000000 HC OXYGEN THERAPY PER DAY

## 2025-02-05 PROCEDURE — 2500000003 HC RX 250 WO HCPCS: Performed by: SURGERY

## 2025-02-05 PROCEDURE — 2580000003 HC RX 258: Performed by: SURGERY

## 2025-02-05 PROCEDURE — 83735 ASSAY OF MAGNESIUM: CPT

## 2025-02-05 PROCEDURE — 93005 ELECTROCARDIOGRAM TRACING: CPT | Performed by: SURGERY

## 2025-02-05 PROCEDURE — 97165 OT EVAL LOW COMPLEX 30 MIN: CPT

## 2025-02-05 PROCEDURE — 84100 ASSAY OF PHOSPHORUS: CPT

## 2025-02-05 PROCEDURE — 6370000000 HC RX 637 (ALT 250 FOR IP): Performed by: NURSE PRACTITIONER

## 2025-02-05 PROCEDURE — 97530 THERAPEUTIC ACTIVITIES: CPT

## 2025-02-05 PROCEDURE — 36415 COLL VENOUS BLD VENIPUNCTURE: CPT

## 2025-02-05 PROCEDURE — 80048 BASIC METABOLIC PNL TOTAL CA: CPT

## 2025-02-05 PROCEDURE — 6360000002 HC RX W HCPCS: Performed by: NURSE PRACTITIONER

## 2025-02-05 PROCEDURE — 97530 THERAPEUTIC ACTIVITIES: CPT | Performed by: PHYSICAL THERAPIST

## 2025-02-05 PROCEDURE — 97161 PT EVAL LOW COMPLEX 20 MIN: CPT | Performed by: PHYSICAL THERAPIST

## 2025-02-05 PROCEDURE — 82962 GLUCOSE BLOOD TEST: CPT

## 2025-02-05 PROCEDURE — 97535 SELF CARE MNGMENT TRAINING: CPT

## 2025-02-05 PROCEDURE — 1100000000 HC RM PRIVATE

## 2025-02-05 RX ORDER — KETOROLAC TROMETHAMINE 30 MG/ML
30 INJECTION, SOLUTION INTRAMUSCULAR; INTRAVENOUS EVERY 6 HOURS
Status: DISCONTINUED | OUTPATIENT
Start: 2025-02-05 | End: 2025-02-08

## 2025-02-05 RX ORDER — ACETAMINOPHEN 325 MG/1
650 TABLET ORAL EVERY 6 HOURS SCHEDULED
Status: DISCONTINUED | OUTPATIENT
Start: 2025-02-05 | End: 2025-02-10 | Stop reason: HOSPADM

## 2025-02-05 RX ADMIN — SMOFLIPID 250 ML: 6; 6; 5; 3 INJECTION, EMULSION INTRAVENOUS at 18:27

## 2025-02-05 RX ADMIN — HYDROMORPHONE HYDROCHLORIDE 0.5 MG: 1 INJECTION, SOLUTION INTRAMUSCULAR; INTRAVENOUS; SUBCUTANEOUS at 02:41

## 2025-02-05 RX ADMIN — ENOXAPARIN SODIUM 40 MG: 100 INJECTION SUBCUTANEOUS at 09:17

## 2025-02-05 RX ADMIN — KETOROLAC TROMETHAMINE 30 MG: 30 INJECTION, SOLUTION INTRAMUSCULAR at 18:48

## 2025-02-05 RX ADMIN — ACETAMINOPHEN 650 MG: 325 TABLET ORAL at 15:11

## 2025-02-05 RX ADMIN — ACETAMINOPHEN 650 MG: 325 TABLET ORAL at 18:49

## 2025-02-05 RX ADMIN — KETOROLAC TROMETHAMINE 30 MG: 30 INJECTION, SOLUTION INTRAMUSCULAR at 13:32

## 2025-02-05 RX ADMIN — CALCIUM GLUCONATE: 98 INJECTION, SOLUTION INTRAVENOUS at 18:41

## 2025-02-05 RX ADMIN — SODIUM CHLORIDE 40 MG: 9 INJECTION INTRAMUSCULAR; INTRAVENOUS; SUBCUTANEOUS at 09:17

## 2025-02-05 ASSESSMENT — PAIN DESCRIPTION - LOCATION
LOCATION: ABDOMEN

## 2025-02-05 ASSESSMENT — PAIN SCALES - GENERAL
PAINLEVEL_OUTOF10: 0
PAINLEVEL_OUTOF10: 3
PAINLEVEL_OUTOF10: 5
PAINLEVEL_OUTOF10: 7

## 2025-02-06 LAB
ALBUMIN SERPL-MCNC: 2 G/DL (ref 3.5–5)
ALBUMIN SERPL-MCNC: 2.2 G/DL (ref 3.5–5)
ALBUMIN/GLOB SERPL: 0.6 (ref 1.1–2.2)
ALBUMIN/GLOB SERPL: 0.7 (ref 1.1–2.2)
ALP SERPL-CCNC: 56 U/L (ref 45–117)
ALP SERPL-CCNC: 60 U/L (ref 45–117)
ALT SERPL-CCNC: 68 U/L (ref 12–78)
ALT SERPL-CCNC: 70 U/L (ref 12–78)
ANION GAP SERPL CALC-SCNC: 4 MMOL/L (ref 2–12)
ANION GAP SERPL CALC-SCNC: 5 MMOL/L (ref 2–12)
APPEARANCE UR: CLEAR
AST SERPL-CCNC: 50 U/L (ref 15–37)
AST SERPL-CCNC: 59 U/L (ref 15–37)
BACTERIA URNS QL MICRO: NEGATIVE /HPF
BASOPHILS # BLD: 0 K/UL (ref 0–0.1)
BASOPHILS # BLD: 0 K/UL (ref 0–0.1)
BASOPHILS NFR BLD: 0 % (ref 0–1)
BASOPHILS NFR BLD: 0 % (ref 0–1)
BILIRUB SERPL-MCNC: 0.3 MG/DL (ref 0.2–1)
BILIRUB SERPL-MCNC: 0.4 MG/DL (ref 0.2–1)
BILIRUB UR QL: NEGATIVE
BUN SERPL-MCNC: 8 MG/DL (ref 6–20)
BUN SERPL-MCNC: 8 MG/DL (ref 6–20)
BUN/CREAT SERPL: 16 (ref 12–20)
BUN/CREAT SERPL: 17 (ref 12–20)
CALCIUM SERPL-MCNC: 8.1 MG/DL (ref 8.5–10.1)
CALCIUM SERPL-MCNC: 8.4 MG/DL (ref 8.5–10.1)
CHLORIDE SERPL-SCNC: 106 MMOL/L (ref 97–108)
CHLORIDE SERPL-SCNC: 107 MMOL/L (ref 97–108)
CO2 SERPL-SCNC: 27 MMOL/L (ref 21–32)
CO2 SERPL-SCNC: 27 MMOL/L (ref 21–32)
COLOR UR: ABNORMAL
CREAT SERPL-MCNC: 0.48 MG/DL (ref 0.55–1.02)
CREAT SERPL-MCNC: 0.5 MG/DL (ref 0.55–1.02)
DIFFERENTIAL METHOD BLD: ABNORMAL
DIFFERENTIAL METHOD BLD: ABNORMAL
EKG ATRIAL RATE: 65 BPM
EKG DIAGNOSIS: NORMAL
EKG P AXIS: 18 DEGREES
EKG P-R INTERVAL: 160 MS
EKG Q-T INTERVAL: 422 MS
EKG QRS DURATION: 86 MS
EKG QTC CALCULATION (BAZETT): 438 MS
EKG R AXIS: 33 DEGREES
EKG T AXIS: 56 DEGREES
EKG VENTRICULAR RATE: 65 BPM
EOSINOPHIL # BLD: 0.1 K/UL (ref 0–0.4)
EOSINOPHIL # BLD: 0.14 K/UL (ref 0–0.4)
EOSINOPHIL NFR BLD: 2 % (ref 0–7)
EOSINOPHIL NFR BLD: 3 % (ref 0–7)
EPITH CASTS URNS QL MICRO: ABNORMAL /LPF
ERYTHROCYTE [DISTWIDTH] IN BLOOD BY AUTOMATED COUNT: 13.6 % (ref 11.5–14.5)
ERYTHROCYTE [DISTWIDTH] IN BLOOD BY AUTOMATED COUNT: 13.7 % (ref 11.5–14.5)
GLOBULIN SER CALC-MCNC: 3.2 G/DL (ref 2–4)
GLOBULIN SER CALC-MCNC: 3.2 G/DL (ref 2–4)
GLUCOSE BLD STRIP.AUTO-MCNC: 111 MG/DL (ref 65–117)
GLUCOSE BLD STRIP.AUTO-MCNC: 113 MG/DL (ref 65–117)
GLUCOSE BLD STRIP.AUTO-MCNC: 116 MG/DL (ref 65–117)
GLUCOSE BLD STRIP.AUTO-MCNC: 92 MG/DL (ref 65–117)
GLUCOSE SERPL-MCNC: 105 MG/DL (ref 65–100)
GLUCOSE SERPL-MCNC: 110 MG/DL (ref 65–100)
GLUCOSE UR STRIP.AUTO-MCNC: NEGATIVE MG/DL
HCT VFR BLD AUTO: 29.1 % (ref 35–47)
HCT VFR BLD AUTO: 31 % (ref 35–47)
HGB BLD-MCNC: 10.4 G/DL (ref 11.5–16)
HGB BLD-MCNC: 9.7 G/DL (ref 11.5–16)
HGB UR QL STRIP: ABNORMAL
HYALINE CASTS URNS QL MICRO: ABNORMAL /LPF (ref 0–2)
IMM GRANULOCYTES # BLD AUTO: 0 K/UL (ref 0–0.04)
IMM GRANULOCYTES # BLD AUTO: 0 K/UL (ref 0–0.04)
IMM GRANULOCYTES NFR BLD AUTO: 0 % (ref 0–0.5)
IMM GRANULOCYTES NFR BLD AUTO: 0 % (ref 0–0.5)
KETONES UR QL STRIP.AUTO: NEGATIVE MG/DL
LEUKOCYTE ESTERASE UR QL STRIP.AUTO: ABNORMAL
LYMPHOCYTES # BLD: 0.33 K/UL (ref 0.8–3.5)
LYMPHOCYTES # BLD: 0.67 K/UL (ref 0.8–3.5)
LYMPHOCYTES NFR BLD: 14 % (ref 12–49)
LYMPHOCYTES NFR BLD: 7 % (ref 12–49)
MAGNESIUM SERPL-MCNC: 2 MG/DL (ref 1.6–2.4)
MAGNESIUM SERPL-MCNC: 2.1 MG/DL (ref 1.6–2.4)
MCH RBC QN AUTO: 29.2 PG (ref 26–34)
MCH RBC QN AUTO: 29.5 PG (ref 26–34)
MCHC RBC AUTO-ENTMCNC: 33.3 G/DL (ref 30–36.5)
MCHC RBC AUTO-ENTMCNC: 33.5 G/DL (ref 30–36.5)
MCV RBC AUTO: 87.1 FL (ref 80–99)
MCV RBC AUTO: 88.4 FL (ref 80–99)
METAMYELOCYTES NFR BLD MANUAL: 2 %
MONOCYTES # BLD: 0.43 K/UL (ref 0–1)
MONOCYTES # BLD: 0.52 K/UL (ref 0–1)
MONOCYTES NFR BLD: 11 % (ref 5–13)
MONOCYTES NFR BLD: 9 % (ref 5–13)
NEUTS BAND NFR BLD MANUAL: 1 %
NEUTS BAND NFR BLD MANUAL: 3 %
NEUTS SEG # BLD: 3.6 K/UL (ref 1.8–8)
NEUTS SEG # BLD: 3.62 K/UL (ref 1.8–8)
NEUTS SEG NFR BLD: 74 % (ref 32–75)
NEUTS SEG NFR BLD: 74 % (ref 32–75)
NITRITE UR QL STRIP.AUTO: NEGATIVE
NRBC # BLD: 0 K/UL (ref 0–0.01)
NRBC # BLD: 0 K/UL (ref 0–0.01)
NRBC BLD-RTO: 0 PER 100 WBC
NRBC BLD-RTO: 0 PER 100 WBC
PH UR STRIP: 7.5 (ref 5–8)
PHOSPHATE SERPL-MCNC: 2.6 MG/DL (ref 2.6–4.7)
PHOSPHATE SERPL-MCNC: 2.7 MG/DL (ref 2.6–4.7)
PLATELET # BLD AUTO: 152 K/UL (ref 150–400)
PLATELET # BLD AUTO: 152 K/UL (ref 150–400)
PMV BLD AUTO: 10 FL (ref 8.9–12.9)
PMV BLD AUTO: 10.2 FL (ref 8.9–12.9)
POTASSIUM SERPL-SCNC: 3.2 MMOL/L (ref 3.5–5.1)
POTASSIUM SERPL-SCNC: 3.5 MMOL/L (ref 3.5–5.1)
PROT SERPL-MCNC: 5.2 G/DL (ref 6.4–8.2)
PROT SERPL-MCNC: 5.4 G/DL (ref 6.4–8.2)
PROT UR STRIP-MCNC: NEGATIVE MG/DL
RBC # BLD AUTO: 3.29 M/UL (ref 3.8–5.2)
RBC # BLD AUTO: 3.56 M/UL (ref 3.8–5.2)
RBC #/AREA URNS HPF: ABNORMAL /HPF (ref 0–5)
RBC MORPH BLD: ABNORMAL
SERVICE CMNT-IMP: NORMAL
SODIUM SERPL-SCNC: 138 MMOL/L (ref 136–145)
SODIUM SERPL-SCNC: 138 MMOL/L (ref 136–145)
SP GR UR REFRACTOMETRY: 1.01
TSH SERPL DL<=0.05 MIU/L-ACNC: 12.5 UIU/ML (ref 0.36–3.74)
URINE CULTURE IF INDICATED: ABNORMAL
UROBILINOGEN UR QL STRIP.AUTO: 0.2 EU/DL (ref 0.2–1)
WBC # BLD AUTO: 4.7 K/UL (ref 3.6–11)
WBC # BLD AUTO: 4.8 K/UL (ref 3.6–11)
WBC URNS QL MICRO: ABNORMAL /HPF (ref 0–4)

## 2025-02-06 PROCEDURE — 2580000003 HC RX 258: Performed by: SURGERY

## 2025-02-06 PROCEDURE — 94760 N-INVAS EAR/PLS OXIMETRY 1: CPT

## 2025-02-06 PROCEDURE — 85025 COMPLETE CBC W/AUTO DIFF WBC: CPT

## 2025-02-06 PROCEDURE — 6370000000 HC RX 637 (ALT 250 FOR IP)

## 2025-02-06 PROCEDURE — 80053 COMPREHEN METABOLIC PANEL: CPT

## 2025-02-06 PROCEDURE — 6360000002 HC RX W HCPCS: Performed by: NURSE PRACTITIONER

## 2025-02-06 PROCEDURE — 81001 URINALYSIS AUTO W/SCOPE: CPT

## 2025-02-06 PROCEDURE — 6370000000 HC RX 637 (ALT 250 FOR IP): Performed by: INTERNAL MEDICINE

## 2025-02-06 PROCEDURE — 97530 THERAPEUTIC ACTIVITIES: CPT | Performed by: PHYSICAL THERAPIST

## 2025-02-06 PROCEDURE — 2500000003 HC RX 250 WO HCPCS: Performed by: SURGERY

## 2025-02-06 PROCEDURE — 6360000002 HC RX W HCPCS: Performed by: SURGERY

## 2025-02-06 PROCEDURE — 83735 ASSAY OF MAGNESIUM: CPT

## 2025-02-06 PROCEDURE — 87086 URINE CULTURE/COLONY COUNT: CPT

## 2025-02-06 PROCEDURE — 84100 ASSAY OF PHOSPHORUS: CPT

## 2025-02-06 PROCEDURE — 1100000000 HC RM PRIVATE

## 2025-02-06 PROCEDURE — 84443 ASSAY THYROID STIM HORMONE: CPT

## 2025-02-06 PROCEDURE — 2700000000 HC OXYGEN THERAPY PER DAY

## 2025-02-06 PROCEDURE — 99024 POSTOP FOLLOW-UP VISIT: CPT | Performed by: NURSE PRACTITIONER

## 2025-02-06 PROCEDURE — 97535 SELF CARE MNGMENT TRAINING: CPT

## 2025-02-06 PROCEDURE — 97530 THERAPEUTIC ACTIVITIES: CPT

## 2025-02-06 PROCEDURE — 6370000000 HC RX 637 (ALT 250 FOR IP): Performed by: NURSE PRACTITIONER

## 2025-02-06 PROCEDURE — 82962 GLUCOSE BLOOD TEST: CPT

## 2025-02-06 PROCEDURE — 36415 COLL VENOUS BLD VENIPUNCTURE: CPT

## 2025-02-06 PROCEDURE — 97110 THERAPEUTIC EXERCISES: CPT | Performed by: PHYSICAL THERAPIST

## 2025-02-06 RX ORDER — LEVOTHYROXINE SODIUM 88 UG/1
88 TABLET ORAL DAILY
Status: DISCONTINUED | OUTPATIENT
Start: 2025-02-06 | End: 2025-02-06

## 2025-02-06 RX ORDER — LOSARTAN POTASSIUM 100 MG/1
100 TABLET ORAL DAILY
Status: DISCONTINUED | OUTPATIENT
Start: 2025-02-06 | End: 2025-02-10 | Stop reason: HOSPADM

## 2025-02-06 RX ORDER — CARVEDILOL 12.5 MG/1
12.5 TABLET ORAL 2 TIMES DAILY
Status: DISCONTINUED | OUTPATIENT
Start: 2025-02-06 | End: 2025-02-10 | Stop reason: HOSPADM

## 2025-02-06 RX ORDER — LEVOTHYROXINE SODIUM 50 UG/1
100 TABLET ORAL DAILY
Status: DISCONTINUED | OUTPATIENT
Start: 2025-02-07 | End: 2025-02-10 | Stop reason: HOSPADM

## 2025-02-06 RX ADMIN — KETOROLAC TROMETHAMINE 30 MG: 30 INJECTION, SOLUTION INTRAMUSCULAR at 00:48

## 2025-02-06 RX ADMIN — LOSARTAN POTASSIUM 100 MG: 100 TABLET, FILM COATED ORAL at 09:29

## 2025-02-06 RX ADMIN — KETOROLAC TROMETHAMINE 30 MG: 30 INJECTION, SOLUTION INTRAMUSCULAR at 05:56

## 2025-02-06 RX ADMIN — POTASSIUM BICARBONATE 40 MEQ: 782 TABLET, EFFERVESCENT ORAL at 02:30

## 2025-02-06 RX ADMIN — CARVEDILOL 12.5 MG: 12.5 TABLET, FILM COATED ORAL at 09:29

## 2025-02-06 RX ADMIN — ACETAMINOPHEN 650 MG: 325 TABLET ORAL at 19:55

## 2025-02-06 RX ADMIN — ENOXAPARIN SODIUM 40 MG: 100 INJECTION SUBCUTANEOUS at 09:28

## 2025-02-06 RX ADMIN — KETOROLAC TROMETHAMINE 30 MG: 30 INJECTION, SOLUTION INTRAMUSCULAR at 19:55

## 2025-02-06 RX ADMIN — SODIUM CHLORIDE 40 MG: 9 INJECTION INTRAMUSCULAR; INTRAVENOUS; SUBCUTANEOUS at 09:29

## 2025-02-06 RX ADMIN — KETOROLAC TROMETHAMINE 30 MG: 30 INJECTION, SOLUTION INTRAMUSCULAR at 12:25

## 2025-02-06 RX ADMIN — ACETAMINOPHEN 650 MG: 325 TABLET ORAL at 12:25

## 2025-02-06 RX ADMIN — POTASSIUM BICARBONATE 20 MEQ: 782 TABLET, EFFERVESCENT ORAL at 09:45

## 2025-02-06 RX ADMIN — DEXTROSE MONOHYDRATE, SODIUM CHLORIDE, AND POTASSIUM CHLORIDE: 50; 4.5; 1.49 INJECTION, SOLUTION INTRAVENOUS at 10:53

## 2025-02-06 RX ADMIN — CARVEDILOL 12.5 MG: 12.5 TABLET, FILM COATED ORAL at 02:30

## 2025-02-06 RX ADMIN — ACETAMINOPHEN 650 MG: 325 TABLET ORAL at 00:48

## 2025-02-06 RX ADMIN — ACETAMINOPHEN 650 MG: 325 TABLET ORAL at 05:56

## 2025-02-06 RX ADMIN — ONDANSETRON 4 MG: 2 INJECTION INTRAMUSCULAR; INTRAVENOUS at 04:06

## 2025-02-06 RX ADMIN — LEVOTHYROXINE SODIUM 88 MCG: 0.09 TABLET ORAL at 05:56

## 2025-02-06 ASSESSMENT — PAIN SCALES - GENERAL: PAINLEVEL_OUTOF10: 0

## 2025-02-06 NOTE — CARE COORDINATION
Transition of Care Plan:     RUR: 14% Moderate Risk  Prior Level of Functioning: Independent with ADLs  Disposition: IPR  ROLANDO: 2/6  Follow up appointments: PCP follow up  DME needed: N/A  Transportation at discharge: Daughter to transport  IM/IMM Medicare/ letter given: 2 IM to be given  Is patient a  and connected with [facility]? N/A  Caregiver Contact:   Ana Avila (Child)  [phone] (Home Phone)  Discharge Caregiver contacted prior to discharge? Yes by pt  Care Conference needed? N/A  Barriers to discharge:  Surg Clearance and placement     Initial Note: Chart Reviewed: Pt pending surg clearance and placement for d/c. CM acknowledges PT/OT recs for high intensity/comprehensive skilled occupational therapy in a multidisciplinary setting as patient is working towards tolerating up to 3 hours of therapy/day 5-7x/week. MD is in agreement with recs. Pt and daughter top IPR FOC is [facility] with [facility] as second choice. CM has sent referrals. CM will continue to follow.             Kush Andrade,BO,  [phone]

## 2025-02-06 NOTE — CONSULTS
Hospitalist Consultation Note    NAME:  Malena GARCIA Margarita   :   1950   MRN:   [id number]     ATTENDING: Maciel Ornelas MD  PCP:  Mingo Rosenberg MD    Date/Time:  2025 9:59 PM      Recommendations/Plan:     Consulted by general surgery for medical management and PVCs    ASSESSMENT:    1.  Encephalopathy-mostly resolved  2.  PVCs, asymptomatic  3.  Hypertension  4.  Hypothyroidism  5.  Hypokalemia  6.  Anemia-likely blood loss due to recent surgery      RECOMMENDATIONS:    1.  EKG- sinus with a couple PVCs, awaiting cards confirmation, continue tele  2.  Hold narcotics unless pain refractory to other options  3.  Check TSH, mag, Phos, CMP, CBC, UA  4.  Restart home meds for HTN and hypothyroidism  5.  Wean oxygen and encourage incentive spirometer, if patient is not oxygenating well we will order chest x-ray.  At this time she is resting comfortably, lungs are clear, speaking in full sentences  6.  Encourage sleep at night and getting out of bed during the day to regulate circadian rhythm and prevent hospital delirium  7. Trend hgb, transfuse for hgb <7  8. Trend bmp, replete electrolytes as needed      Code Status: Full  DVT Prophylaxis: Lovenox  ETOH:no  Smoking:no     Subjective:   REQUESTING PHYSICIAN/ SERVICE: general surgery  Hospitalist day shift team to follow along with patient this admit.   -Thank you for allowing Hospitalist service the opportunity to participate in this patient's care.    HPI/ REASON FOR CONSULT:      aMlena is a 75 y.o. female with a past medical history of anxiety, arthritis, coronary artery disease, fibromyalgia, GERD, hypertension, hypothyroidism, pancreatitis.  Hospitalist group was asked to see in consultation for general medical management, encephalopathy and PVCs.  She arrived to [facility] ED via EMS with chief complaint of right side and mid abdominal pain on 2025.  From chart review it appears she had an upper GI/swallow test the day prior at [facility].  Patient was 
catheter occupies 17% of vein  Brand of catheter:  Zaask POWER PICC SOLO  Lot #[id number]   REF# [id number]    EXP 2025-10-31.  Primary nurse RUTH Byers, notified PICC line may be used and to hang new infusion tubing prior to use.      Skylar Driscoll RN [facility]   Vascular Access Team

## 2025-02-07 LAB
BACTERIA SPEC CULT: NORMAL
CC UR VC: NORMAL
GLUCOSE BLD STRIP.AUTO-MCNC: 82 MG/DL (ref 65–117)
GLUCOSE BLD STRIP.AUTO-MCNC: 98 MG/DL (ref 65–117)
SERVICE CMNT-IMP: NORMAL

## 2025-02-07 PROCEDURE — 6360000002 HC RX W HCPCS: Performed by: INTERNAL MEDICINE

## 2025-02-07 PROCEDURE — 2500000003 HC RX 250 WO HCPCS

## 2025-02-07 PROCEDURE — 6370000000 HC RX 637 (ALT 250 FOR IP)

## 2025-02-07 PROCEDURE — 97530 THERAPEUTIC ACTIVITIES: CPT

## 2025-02-07 PROCEDURE — 6360000002 HC RX W HCPCS: Performed by: SURGERY

## 2025-02-07 PROCEDURE — 82962 GLUCOSE BLOOD TEST: CPT

## 2025-02-07 PROCEDURE — 6360000002 HC RX W HCPCS

## 2025-02-07 PROCEDURE — 6370000000 HC RX 637 (ALT 250 FOR IP): Performed by: NURSE PRACTITIONER

## 2025-02-07 PROCEDURE — 99024 POSTOP FOLLOW-UP VISIT: CPT | Performed by: NURSE PRACTITIONER

## 2025-02-07 PROCEDURE — 6370000000 HC RX 637 (ALT 250 FOR IP): Performed by: INTERNAL MEDICINE

## 2025-02-07 PROCEDURE — 97535 SELF CARE MNGMENT TRAINING: CPT

## 2025-02-07 PROCEDURE — 1100000000 HC RM PRIVATE

## 2025-02-07 PROCEDURE — 6360000002 HC RX W HCPCS: Performed by: NURSE PRACTITIONER

## 2025-02-07 RX ORDER — HALOPERIDOL 5 MG/ML
1 INJECTION INTRAMUSCULAR EVERY 4 HOURS PRN
Status: DISCONTINUED | OUTPATIENT
Start: 2025-02-07 | End: 2025-02-10 | Stop reason: HOSPADM

## 2025-02-07 RX ORDER — HALOPERIDOL 2 MG/ML
1 SOLUTION ORAL
Status: DISCONTINUED | OUTPATIENT
Start: 2025-02-07 | End: 2025-02-07

## 2025-02-07 RX ORDER — HALOPERIDOL 5 MG/ML
1 INJECTION INTRAMUSCULAR ONCE
Status: COMPLETED | OUTPATIENT
Start: 2025-02-07 | End: 2025-02-07

## 2025-02-07 RX ADMIN — LEVOTHYROXINE SODIUM 100 MCG: 0.05 TABLET ORAL at 06:00

## 2025-02-07 RX ADMIN — ACETAMINOPHEN 650 MG: 325 TABLET ORAL at 12:14

## 2025-02-07 RX ADMIN — HALOPERIDOL LACTATE 1 MG: 5 INJECTION, SOLUTION INTRAMUSCULAR at 07:25

## 2025-02-07 RX ADMIN — ENOXAPARIN SODIUM 40 MG: 100 INJECTION SUBCUTANEOUS at 12:13

## 2025-02-07 RX ADMIN — WATER 2.5 MG: 1 INJECTION INTRAMUSCULAR; INTRAVENOUS; SUBCUTANEOUS at 22:01

## 2025-02-07 RX ADMIN — KETOROLAC TROMETHAMINE 30 MG: 30 INJECTION, SOLUTION INTRAMUSCULAR at 06:00

## 2025-02-07 RX ADMIN — HALOPERIDOL LACTATE 1 MG: 5 INJECTION, SOLUTION INTRAMUSCULAR at 17:54

## 2025-02-07 RX ADMIN — CARVEDILOL 12.5 MG: 12.5 TABLET, FILM COATED ORAL at 12:03

## 2025-02-07 RX ADMIN — KETOROLAC TROMETHAMINE 30 MG: 30 INJECTION, SOLUTION INTRAMUSCULAR at 12:03

## 2025-02-07 RX ADMIN — ACETAMINOPHEN 650 MG: 325 TABLET ORAL at 06:00

## 2025-02-07 RX ADMIN — LOSARTAN POTASSIUM 100 MG: 100 TABLET, FILM COATED ORAL at 12:03

## 2025-02-07 ASSESSMENT — PAIN DESCRIPTION - ORIENTATION
ORIENTATION: ANTERIOR

## 2025-02-07 ASSESSMENT — PAIN SCALES - GENERAL
PAINLEVEL_OUTOF10: 0
PAINLEVEL_OUTOF10: 10
PAINLEVEL_OUTOF10: 10
PAINLEVEL_OUTOF10: 4
PAINLEVEL_OUTOF10: 5

## 2025-02-07 ASSESSMENT — PAIN DESCRIPTION - DESCRIPTORS
DESCRIPTORS: ACHING
DESCRIPTORS: ACHING
DESCRIPTORS: DULL;ACHING
DESCRIPTORS: DULL;ACHING

## 2025-02-07 ASSESSMENT — PAIN DESCRIPTION - LOCATION
LOCATION: ABDOMEN
LOCATION: ABDOMEN
LOCATION: ARM
LOCATION: ABDOMEN

## 2025-02-07 ASSESSMENT — PAIN DESCRIPTION - ONSET: ONSET: SUDDEN

## 2025-02-07 ASSESSMENT — PAIN - FUNCTIONAL ASSESSMENT: PAIN_FUNCTIONAL_ASSESSMENT: ACTIVITIES ARE NOT PREVENTED

## 2025-02-07 ASSESSMENT — PAIN DESCRIPTION - DIRECTION: RADIATING_TOWARDS: UPPER RIGHT ARM

## 2025-02-07 ASSESSMENT — PAIN DESCRIPTION - PAIN TYPE: TYPE: OTHER (COMMENT)

## 2025-02-07 ASSESSMENT — PAIN DESCRIPTION - FREQUENCY: FREQUENCY: INTERMITTENT

## 2025-02-07 NOTE — CARE COORDINATION
Transition of Care Plan:     RUR: 14% Moderate Risk  Prior Level of Functioning: Independent with ADLs  Disposition: IPR  ROLANDO: 2/10  Follow up appointments: PCP follow up  DME needed: N/A  Transportation at discharge: Daughter to transport  IM/IMM Medicare/ letter given: 2 IM to be given  Is patient a  and connected with [facility]? N/A  Caregiver Contact:   Ana Avila (Child)  [phone] (Home Phone)  Discharge Caregiver contacted prior to discharge? Yes by pt  Care Conference needed? N/A  Barriers to discharge:  Surg Clearance and placement     Initial Note: Chart Reviewed: Pt pending surg clearance and placement for d/c. CM spoke with Lauro BEI and she stated a bed wont be available until early next week . CM will continue to follow.      BO Valle,  [phone]

## 2025-02-08 LAB
GLUCOSE BLD STRIP.AUTO-MCNC: 109 MG/DL (ref 65–117)
GLUCOSE BLD STRIP.AUTO-MCNC: 94 MG/DL (ref 65–117)
GLUCOSE BLD STRIP.AUTO-MCNC: 94 MG/DL (ref 65–117)
SERVICE CMNT-IMP: NORMAL

## 2025-02-08 PROCEDURE — 82962 GLUCOSE BLOOD TEST: CPT

## 2025-02-08 PROCEDURE — 6370000000 HC RX 637 (ALT 250 FOR IP): Performed by: INTERNAL MEDICINE

## 2025-02-08 PROCEDURE — 6370000000 HC RX 637 (ALT 250 FOR IP): Performed by: NURSE PRACTITIONER

## 2025-02-08 PROCEDURE — 1100000000 HC RM PRIVATE

## 2025-02-08 PROCEDURE — 6360000002 HC RX W HCPCS: Performed by: SURGERY

## 2025-02-08 PROCEDURE — 6370000000 HC RX 637 (ALT 250 FOR IP)

## 2025-02-08 RX ORDER — PANTOPRAZOLE SODIUM 40 MG/1
40 TABLET, DELAYED RELEASE ORAL
Status: DISCONTINUED | OUTPATIENT
Start: 2025-02-08 | End: 2025-02-10 | Stop reason: HOSPADM

## 2025-02-08 RX ORDER — GABAPENTIN 300 MG/1
600 CAPSULE ORAL 2 TIMES DAILY
Status: DISCONTINUED | OUTPATIENT
Start: 2025-02-08 | End: 2025-02-10 | Stop reason: HOSPADM

## 2025-02-08 RX ORDER — ASPIRIN 81 MG/1
81 TABLET, CHEWABLE ORAL DAILY
Status: DISCONTINUED | OUTPATIENT
Start: 2025-02-08 | End: 2025-02-10 | Stop reason: HOSPADM

## 2025-02-08 RX ORDER — OLANZAPINE 5 MG/1
5 TABLET ORAL EVERY EVENING
Status: DISCONTINUED | OUTPATIENT
Start: 2025-02-08 | End: 2025-02-10 | Stop reason: HOSPADM

## 2025-02-08 RX ORDER — AMLODIPINE BESYLATE 5 MG/1
10 TABLET ORAL DAILY
Status: DISCONTINUED | OUTPATIENT
Start: 2025-02-08 | End: 2025-02-10 | Stop reason: HOSPADM

## 2025-02-08 RX ORDER — VENLAFAXINE HYDROCHLORIDE 150 MG/1
150 CAPSULE, EXTENDED RELEASE ORAL DAILY
Status: DISCONTINUED | OUTPATIENT
Start: 2025-02-08 | End: 2025-02-10 | Stop reason: HOSPADM

## 2025-02-08 RX ORDER — VENLAFAXINE 37.5 MG/1
75 TABLET ORAL
Status: DISCONTINUED | OUTPATIENT
Start: 2025-02-08 | End: 2025-02-10 | Stop reason: HOSPADM

## 2025-02-08 RX ORDER — OLANZAPINE 5 MG/1
5 TABLET ORAL NIGHTLY
Status: DISCONTINUED | OUTPATIENT
Start: 2025-02-08 | End: 2025-02-08

## 2025-02-08 RX ORDER — MELOXICAM 7.5 MG/1
15 TABLET ORAL DAILY
Status: DISCONTINUED | OUTPATIENT
Start: 2025-02-08 | End: 2025-02-10 | Stop reason: HOSPADM

## 2025-02-08 RX ADMIN — VENLAFAXINE HYDROCHLORIDE 75 MG: 37.5 TABLET ORAL at 20:27

## 2025-02-08 RX ADMIN — CARVEDILOL 12.5 MG: 12.5 TABLET, FILM COATED ORAL at 08:44

## 2025-02-08 RX ADMIN — ACETAMINOPHEN 650 MG: 325 TABLET ORAL at 11:01

## 2025-02-08 RX ADMIN — VENLAFAXINE HYDROCHLORIDE 150 MG: 150 CAPSULE, EXTENDED RELEASE ORAL at 08:44

## 2025-02-08 RX ADMIN — OLANZAPINE 5 MG: 5 TABLET, FILM COATED ORAL at 18:04

## 2025-02-08 RX ADMIN — GABAPENTIN 600 MG: 300 CAPSULE ORAL at 08:44

## 2025-02-08 RX ADMIN — CARVEDILOL 12.5 MG: 12.5 TABLET, FILM COATED ORAL at 20:27

## 2025-02-08 RX ADMIN — ASPIRIN 81 MG: 81 TABLET, CHEWABLE ORAL at 08:44

## 2025-02-08 RX ADMIN — AMLODIPINE BESYLATE 10 MG: 5 TABLET ORAL at 08:43

## 2025-02-08 RX ADMIN — LOSARTAN POTASSIUM 100 MG: 100 TABLET, FILM COATED ORAL at 08:44

## 2025-02-08 RX ADMIN — PANTOPRAZOLE SODIUM 40 MG: 40 TABLET, DELAYED RELEASE ORAL at 11:01

## 2025-02-08 RX ADMIN — GABAPENTIN 600 MG: 300 CAPSULE ORAL at 20:27

## 2025-02-08 RX ADMIN — ACETAMINOPHEN 650 MG: 325 TABLET ORAL at 18:00

## 2025-02-08 RX ADMIN — MELOXICAM 15 MG: 7.5 TABLET ORAL at 11:01

## 2025-02-08 RX ADMIN — ENOXAPARIN SODIUM 40 MG: 100 INJECTION SUBCUTANEOUS at 08:45

## 2025-02-08 ASSESSMENT — PAIN SCALES - GENERAL
PAINLEVEL_OUTOF10: 0
PAINLEVEL_OUTOF10: 0

## 2025-02-09 LAB
ANION GAP SERPL CALC-SCNC: 9 MMOL/L (ref 2–12)
BASOPHILS # BLD: 0.08 K/UL (ref 0–0.1)
BASOPHILS NFR BLD: 1 % (ref 0–1)
BUN SERPL-MCNC: 9 MG/DL (ref 6–20)
BUN/CREAT SERPL: 16 (ref 12–20)
CALCIUM SERPL-MCNC: 8.3 MG/DL (ref 8.5–10.1)
CHLORIDE SERPL-SCNC: 109 MMOL/L (ref 97–108)
CO2 SERPL-SCNC: 24 MMOL/L (ref 21–32)
CREAT SERPL-MCNC: 0.58 MG/DL (ref 0.55–1.02)
DIFFERENTIAL METHOD BLD: ABNORMAL
EOSINOPHIL # BLD: 0.3 K/UL (ref 0–0.4)
EOSINOPHIL NFR BLD: 4 % (ref 0–7)
ERYTHROCYTE [DISTWIDTH] IN BLOOD BY AUTOMATED COUNT: 14.3 % (ref 11.5–14.5)
GLUCOSE BLD STRIP.AUTO-MCNC: 113 MG/DL (ref 65–117)
GLUCOSE BLD STRIP.AUTO-MCNC: 99 MG/DL (ref 65–117)
GLUCOSE SERPL-MCNC: 104 MG/DL (ref 65–100)
HCT VFR BLD AUTO: 29.3 % (ref 35–47)
HGB BLD-MCNC: 10 G/DL (ref 11.5–16)
IMM GRANULOCYTES # BLD AUTO: 0 K/UL (ref 0–0.04)
IMM GRANULOCYTES NFR BLD AUTO: 0 % (ref 0–0.5)
LYMPHOCYTES # BLD: 0.75 K/UL (ref 0.8–3.5)
LYMPHOCYTES NFR BLD: 10 % (ref 12–49)
MCH RBC QN AUTO: 29.3 PG (ref 26–34)
MCHC RBC AUTO-ENTMCNC: 34.1 G/DL (ref 30–36.5)
MCV RBC AUTO: 85.9 FL (ref 80–99)
MONOCYTES # BLD: 0.45 K/UL (ref 0–1)
MONOCYTES NFR BLD: 6 % (ref 5–13)
NEUTS BAND NFR BLD MANUAL: 3 %
NEUTS SEG # BLD: 5.92 K/UL (ref 1.8–8)
NEUTS SEG NFR BLD: 76 % (ref 32–75)
NRBC # BLD: 0 K/UL (ref 0–0.01)
NRBC BLD-RTO: 0 PER 100 WBC
PLATELET # BLD AUTO: 283 K/UL (ref 150–400)
PMV BLD AUTO: 9.8 FL (ref 8.9–12.9)
POTASSIUM SERPL-SCNC: 3.7 MMOL/L (ref 3.5–5.1)
RBC # BLD AUTO: 3.41 M/UL (ref 3.8–5.2)
RBC MORPH BLD: ABNORMAL
SERVICE CMNT-IMP: NORMAL
SERVICE CMNT-IMP: NORMAL
SODIUM SERPL-SCNC: 142 MMOL/L (ref 136–145)
WBC # BLD AUTO: 7.5 K/UL (ref 3.6–11)

## 2025-02-09 PROCEDURE — 80048 BASIC METABOLIC PNL TOTAL CA: CPT

## 2025-02-09 PROCEDURE — 82962 GLUCOSE BLOOD TEST: CPT

## 2025-02-09 PROCEDURE — 36415 COLL VENOUS BLD VENIPUNCTURE: CPT

## 2025-02-09 PROCEDURE — 85025 COMPLETE CBC W/AUTO DIFF WBC: CPT

## 2025-02-09 PROCEDURE — 6370000000 HC RX 637 (ALT 250 FOR IP): Performed by: NURSE PRACTITIONER

## 2025-02-09 PROCEDURE — 6360000002 HC RX W HCPCS: Performed by: SURGERY

## 2025-02-09 PROCEDURE — 6370000000 HC RX 637 (ALT 250 FOR IP)

## 2025-02-09 PROCEDURE — 1100000000 HC RM PRIVATE

## 2025-02-09 PROCEDURE — 6370000000 HC RX 637 (ALT 250 FOR IP): Performed by: INTERNAL MEDICINE

## 2025-02-09 RX ADMIN — ACETAMINOPHEN 650 MG: 325 TABLET ORAL at 01:21

## 2025-02-09 RX ADMIN — ASPIRIN 81 MG: 81 TABLET, CHEWABLE ORAL at 08:22

## 2025-02-09 RX ADMIN — MELOXICAM 15 MG: 7.5 TABLET ORAL at 08:22

## 2025-02-09 RX ADMIN — ENOXAPARIN SODIUM 40 MG: 100 INJECTION SUBCUTANEOUS at 08:24

## 2025-02-09 RX ADMIN — PANTOPRAZOLE SODIUM 40 MG: 40 TABLET, DELAYED RELEASE ORAL at 06:31

## 2025-02-09 RX ADMIN — LEVOTHYROXINE SODIUM 100 MCG: 0.05 TABLET ORAL at 06:31

## 2025-02-09 RX ADMIN — ACETAMINOPHEN 650 MG: 325 TABLET ORAL at 17:58

## 2025-02-09 RX ADMIN — GABAPENTIN 600 MG: 300 CAPSULE ORAL at 20:28

## 2025-02-09 RX ADMIN — VENLAFAXINE HYDROCHLORIDE 150 MG: 150 CAPSULE, EXTENDED RELEASE ORAL at 08:22

## 2025-02-09 RX ADMIN — VENLAFAXINE HYDROCHLORIDE 75 MG: 37.5 TABLET ORAL at 20:28

## 2025-02-09 RX ADMIN — ACETAMINOPHEN 650 MG: 325 TABLET ORAL at 06:31

## 2025-02-09 RX ADMIN — OLANZAPINE 5 MG: 5 TABLET, FILM COATED ORAL at 17:58

## 2025-02-09 RX ADMIN — AMLODIPINE BESYLATE 10 MG: 5 TABLET ORAL at 08:22

## 2025-02-09 RX ADMIN — CARVEDILOL 12.5 MG: 12.5 TABLET, FILM COATED ORAL at 08:22

## 2025-02-09 RX ADMIN — LOSARTAN POTASSIUM 100 MG: 100 TABLET, FILM COATED ORAL at 08:22

## 2025-02-09 RX ADMIN — CARVEDILOL 12.5 MG: 12.5 TABLET, FILM COATED ORAL at 20:28

## 2025-02-09 RX ADMIN — GABAPENTIN 600 MG: 300 CAPSULE ORAL at 08:22

## 2025-02-09 ASSESSMENT — PAIN SCALES - GENERAL: PAINLEVEL_OUTOF10: 0

## 2025-02-10 VITALS
DIASTOLIC BLOOD PRESSURE: 76 MMHG | TEMPERATURE: 98.2 F | HEIGHT: 61 IN | SYSTOLIC BLOOD PRESSURE: 153 MMHG | WEIGHT: 201 LBS | HEART RATE: 66 BPM | BODY MASS INDEX: 37.95 KG/M2 | OXYGEN SATURATION: 95 % | RESPIRATION RATE: 15 BRPM

## 2025-02-10 PROCEDURE — 6360000002 HC RX W HCPCS: Performed by: SURGERY

## 2025-02-10 PROCEDURE — 6370000000 HC RX 637 (ALT 250 FOR IP): Performed by: NURSE PRACTITIONER

## 2025-02-10 PROCEDURE — 6370000000 HC RX 637 (ALT 250 FOR IP): Performed by: INTERNAL MEDICINE

## 2025-02-10 PROCEDURE — 99024 POSTOP FOLLOW-UP VISIT: CPT | Performed by: NURSE PRACTITIONER

## 2025-02-10 PROCEDURE — 97116 GAIT TRAINING THERAPY: CPT | Performed by: PHYSICAL THERAPIST

## 2025-02-10 PROCEDURE — 6370000000 HC RX 637 (ALT 250 FOR IP)

## 2025-02-10 RX ADMIN — ENOXAPARIN SODIUM 40 MG: 100 INJECTION SUBCUTANEOUS at 10:22

## 2025-02-10 RX ADMIN — CARVEDILOL 12.5 MG: 12.5 TABLET, FILM COATED ORAL at 10:20

## 2025-02-10 RX ADMIN — ASPIRIN 81 MG: 81 TABLET, CHEWABLE ORAL at 10:20

## 2025-02-10 RX ADMIN — GABAPENTIN 600 MG: 300 CAPSULE ORAL at 10:21

## 2025-02-10 RX ADMIN — MELOXICAM 15 MG: 7.5 TABLET ORAL at 10:20

## 2025-02-10 RX ADMIN — ACETAMINOPHEN 650 MG: 325 TABLET ORAL at 00:25

## 2025-02-10 RX ADMIN — PANTOPRAZOLE SODIUM 40 MG: 40 TABLET, DELAYED RELEASE ORAL at 06:41

## 2025-02-10 RX ADMIN — VENLAFAXINE HYDROCHLORIDE 150 MG: 150 CAPSULE, EXTENDED RELEASE ORAL at 10:20

## 2025-02-10 RX ADMIN — AMLODIPINE BESYLATE 10 MG: 5 TABLET ORAL at 10:20

## 2025-02-10 RX ADMIN — ACETAMINOPHEN 650 MG: 325 TABLET ORAL at 06:41

## 2025-02-10 RX ADMIN — LOSARTAN POTASSIUM 100 MG: 100 TABLET, FILM COATED ORAL at 10:20

## 2025-02-10 RX ADMIN — LEVOTHYROXINE SODIUM 100 MCG: 0.05 TABLET ORAL at 06:41

## 2025-02-10 ASSESSMENT — PAIN SCALES - GENERAL
PAINLEVEL_OUTOF10: 3
PAINLEVEL_OUTOF10: 0

## 2025-02-10 NOTE — PLAN OF CARE
Problem: Discharge Planning  Goal: Discharge to home or other facility with appropriate resources  2/3/2025 0354 by Lupe Phillips RN  Outcome: Progressing  2/2/2025 1435 by Tori Lambert LPN  Outcome: Progressing  Flowsheets (Taken 2/2/2025 0857)  Discharge to home or other facility with appropriate resources:   Identify barriers to discharge with patient and caregiver   Identify discharge learning needs (meds, wound care, etc)   Arrange for needed discharge resources and transportation as appropriate     Problem: Pain  Goal: Verbalizes/displays adequate comfort level or baseline comfort level  2/3/2025 0354 by Lupe Phillips RN  Outcome: Progressing  2/2/2025 1435 by Tori Lambert LPN  Outcome: Progressing  Flowsheets (Taken 2/2/2025 0857)  Verbalizes/displays adequate comfort level or baseline comfort level:   Encourage patient to monitor pain and request assistance   Assess pain using appropriate pain scale   Administer analgesics based on type and severity of pain and evaluate response     Problem: Skin/Tissue Integrity  Goal: Skin integrity remains intact  Description: 1.  Monitor for areas of redness and/or skin breakdown  2.  Assess vascular access sites hourly  3.  Every 4-6 hours minimum:  Change oxygen saturation probe site  4.  Every 4-6 hours:  If on nasal continuous positive airway pressure, respiratory therapy assess nares and determine need for appliance change or resting period  2/3/2025 0354 by Lupe Phillips RN  Outcome: Progressing  Flowsheets  Taken 2/2/2025 1921 by Lupe Phillips RN  Skin Integrity Remains Intact: Monitor for areas of redness and/or skin breakdown  Taken 2/2/2025 1528 by Tori Lambert LPN  Skin Integrity Remains Intact: Monitor for areas of redness and/or skin breakdown  2/2/2025 1435 by Tori Lambert LPN  Outcome: Progressing  Flowsheets (Taken 2/2/2025 0857)  Skin Integrity Remains Intact: Monitor for areas of redness and/or skin breakdown     Problem: Safety - 
  Problem: Discharge Planning  Goal: Discharge to home or other facility with appropriate resources  2/9/2025 2251 by Jumana Chavez RN  Outcome: Progressing  Flowsheets (Taken 2/9/2025 2020)  Discharge to home or other facility with appropriate resources:   Identify barriers to discharge with patient and caregiver   Arrange for needed discharge resources and transportation as appropriate   Identify discharge learning needs (meds, wound care, etc)  2/9/2025 1002 by Zeeshan Corbett RN  Outcome: Progressing     Problem: Pain  Goal: Verbalizes/displays adequate comfort level or baseline comfort level  2/9/2025 2251 by Jumana Chavez RN  Outcome: Progressing  2/9/2025 1002 by Zeeshan Corbett RN  Outcome: Progressing     Problem: Skin/Tissue Integrity  Goal: Skin integrity remains intact  Description: 1.  Monitor for areas of redness and/or skin breakdown  2.  Assess vascular access sites hourly  3.  Every 4-6 hours minimum:  Change oxygen saturation probe site  4.  Every 4-6 hours:  If on nasal continuous positive airway pressure, respiratory therapy assess nares and determine need for appliance change or resting period  2/9/2025 2251 by Jumana Chavez RN  Outcome: Progressing  Flowsheets  Taken 2/9/2025 2020 by Jumana Chavez RN  Skin Integrity Remains Intact: Monitor for areas of redness and/or skin breakdown  Taken 2/9/2025 1510 by Zeeshan Corbett RN  Skin Integrity Remains Intact:   Assess vascular access sites hourly   Monitor for areas of redness and/or skin breakdown  2/9/2025 1002 by Zeeshan Corbett RN  Outcome: Progressing  Flowsheets (Taken 2/9/2025 0820)  Skin Integrity Remains Intact: Assess vascular access sites hourly     Problem: Safety - Adult  Goal: Free from fall injury  2/9/2025 2251 by Jumana Chavez RN  Outcome: Progressing  2/9/2025 1002 by Zeeshan Corbett RN  Outcome: Progressing     Problem: ABCDS Injury Assessment  Goal: Absence of physical injury  2/9/2025 2251 by 
  Problem: Discharge Planning  Goal: Discharge to home or other facility with appropriate resources  Outcome: Progressing     
  Problem: Discharge Planning  Goal: Discharge to home or other facility with appropriate resources  Outcome: Progressing     Problem: Pain  Goal: Verbalizes/displays adequate comfort level or baseline comfort level  Outcome: Progressing  Flowsheets (Taken 2/6/2025 2004)  Verbalizes/displays adequate comfort level or baseline comfort level: Encourage patient to monitor pain and request assistance     Problem: Skin/Tissue Integrity  Goal: Skin integrity remains intact  Description: 1.  Monitor for areas of redness and/or skin breakdown  2.  Assess vascular access sites hourly  3.  Every 4-6 hours minimum:  Change oxygen saturation probe site  4.  Every 4-6 hours:  If on nasal continuous positive airway pressure, respiratory therapy assess nares and determine need for appliance change or resting period  Outcome: Progressing     Problem: Safety - Adult  Goal: Free from fall injury  Outcome: Progressing     Problem: ABCDS Injury Assessment  Goal: Absence of physical injury  Outcome: Progressing     Problem: Safety - Medical Restraint  Goal: Remains free of injury from restraints (Restraint for Interference with Medical Device)  Description: INTERVENTIONS:  1. Determine that other, less restrictive measures have been tried or would not be effective before applying the restraint  2. Evaluate the patient's condition at the time of restraint application  3. Inform patient/family regarding the reason for restraint  4. Q2H: Monitor safety, psychosocial status, comfort, nutrition and hydration  Outcome: Progressing     Problem: Nutrition Deficit:  Goal: Optimize nutritional status  Outcome: Progressing     
  Problem: Physical Therapy - Adult  Goal: By Discharge: Performs mobility at highest level of function for planned discharge setting.  See evaluation for individualized goals.  Description: FUNCTIONAL STATUS PRIOR TO ADMISSION: Patient was independent and active without use of DME.    HOME SUPPORT PRIOR TO ADMISSION: The patient lived alone but has supportive family in the area    Physical Therapy Goals  Initiated 2/5/2025  1.  Patient will move from supine to sit and sit to supine in bed with modified independence within 7 day(s).    2.  Patient will perform sit to stand with contact guard assist within 7 day(s).  3.  Patient will transfer from bed to chair and chair to bed with minimal assistance using the least restrictive device within 7 day(s).  4.  Patient will ambulate with minimal assistance for 25 feet with the least restrictive device within 7 day(s).     Outcome: Progressing   PHYSICAL THERAPY TREATMENT    Patient: Malena Avila (75 y.o. female)  Date: 2/10/2025  Diagnosis: Abdominal pain, right lower quadrant [R10.31]  Nausea [R11.0]  SBO (small bowel obstruction) ([facility]) [K56.609]  Small bowel obstruction due to adhesions ([facility]CC) [K56.50] SBO (small bowel obstruction) ([facility])  Procedure(s) (LRB):  LAPAROTOMY EXPLORATORY (N/A) 10 Days Post-Op  Precautions:                        ASSESSMENT:  Patient continues to benefit from skilled PT services and is progressing towards goals. Patient demonstrating improvements in overall mobility, balance and endurance. She is able to complete overall mobility with supervision to SBA. She was able to ambulate 100 feet in [place] using RW for support. Gait is slow but steady with no overt LOB noted. She completed stair training with SBA.   Patient has progressed well in therapy and now recommending HHPT following discharge. Daughter will be staying with daughter following discharge. Patient has RW and rollator at home.         PLAN:  Patient continues to benefit from 
Problem: Discharge Planning  Goal: Discharge to home or other facility with appropriate resources  2/8/2025 2145 by Consuelo Ghotra RN  Outcome: Progressing  2/8/2025 0954 by Zeeshan Corbett RN  Outcome: Not Progressing  Flowsheets (Taken 2/8/2025 0434 by Consuelo Ghotra RN)  Discharge to home or other facility with appropriate resources: Identify barriers to discharge with patient and caregiver    Problem: Pain  Goal: Verbalizes/displays adequate comfort level or baseline comfort level  2/8/2025 2145 by Consuelo Ghotra RN  Outcome: Progressing  2/8/2025 0954 by Zeeshan Corbett RN  Outcome: Progressing     Problem: Skin/Tissue Integrity  Goal: Skin integrity remains intact  Description: 1.  Monitor for areas of redness and/or skin breakdown  2.  Assess vascular access sites hourly  3.  Every 4-6 hours minimum:  Change oxygen saturation probe site  4.  Every 4-6 hours:  If on nasal continuous positive airway pressure, respiratory therapy assess nares and determine need for appliance change or resting period  2/8/2025 2145 by Consuelo Ghotra RN  Outcome: Progressing  2/8/2025 0954 by Zeeshan Corbett RN  Outcome: Progressing  Flowsheets  Taken 2/8/2025 0840 by Zeeshan Corbett RN  Skin Integrity Remains Intact:   Monitor for areas of redness and/or skin breakdown   Assess vascular access sites hourly  Taken 2/8/2025 0434 by Consuelo Ghotra RN  Skin Integrity Remains Intact: Monitor for areas of redness and/or skin breakdown     Problem: Safety - Adult  Goal: Free from fall injury  2/8/2025 2145 by Consuelo Ghotra RN  Outcome: Progressing  2/8/2025 0954 by Zeeshan Corbett RN  Outcome: Progressing     Problem: ABCDS Injury Assessment  Goal: Absence of physical injury  2/8/2025 2145 by Consuelo Ghotra RN  Outcome: Progressing  2/8/2025 0954 by Zeeshan Corbett RN  Outcome: Progressing     Problem: Safety - Medical Restraint  Goal: Remains free of injury from restraints (Restraint for Interference with Medical Device)  Description: 
Confusion, delirium, dementia, or psychosis is improved or at baseline  Description: INTERVENTIONS:  1. Assess for possible contributors to thought disturbance, including medications, impaired vision or hearing, underlying metabolic abnormalities, dehydration, psychiatric diagnoses, and notify attending LIP  2. [place] high risk fall precautions, as indicated  3. Provide frequent short contacts to provide reality reorientation, refocusing and direction  4. Decrease environmental stimuli, including noise as appropriate  5. Monitor and intervene to maintain adequate nutrition, hydration, elimination, sleep and activity  6. If unable to ensure safety without constant attention obtain sitter and review sitter guidelines with assigned personnel  7. Initiate Psychosocial CNS and Spiritual Care consult, as indicated  Outcome: Not Progressing     
transfer training, gait training, therapeutic exercises, neuromuscular re-education, patient and family training/education, and therapeutic activities    Frequency/Duration: Patient will be followed by physical therapy to address goals, PT Plan of Care: 5 times/week to address goals.        Recommendation for discharge: (in order for the patient to meet his/her long term goals):   High intensity/comprehensive skilled physical therapy in a multidisciplinary setting as patient is working towards tolerating up to 3 hours of therapy/day 5-7x/week    Other factors to consider for discharge: high risk for falls, not safe to be alone, and concern for safely navigating or managing the home environment    IF patient discharges home will need the following DME: continuing to assess with progress                SUBJECTIVE:   Patient stated “I'm doing better.”    OBJECTIVE DATA SUMMARY:       Past Medical History:   Diagnosis Date    Acid reflux     Adverse effect of anesthesia     slow to wake after surgery    Allergic rhinitis     Anxiety No idea    Arrhythmia     MVP    Arthritis     Autoimmune disease (HCC)     lichen planus    CAD (coronary artery disease)     MVP,    Fibromyalgia     GERD (gastroesophageal reflux disease)     Hypertension     Hypothyroidism     Menopause     Nausea & vomiting     Obesity     Osteoarthritis 2000    Pancreatitis     Plantar fasciitis     PONV (postoperative nausea and vomiting)     Thyroid disease     TMJ (dislocation of temporomandibular joint)     Urinary incontinence      Past Surgical History:   Procedure Laterality Date    APPENDECTOMY      CHOLECYSTECTOMY      COLONOSCOPY      COLONOSCOPY,DIAGNOSTIC  10/1/2015         COSMETIC SURGERY      On nose    EYE SURGERY      On lids & cataracts    GYN      d & c    HYSTERECTOMY (CERVIX STATUS UNKNOWN)      partial    KNEE SURGERY  2014,2015    Both knees replaced    LAPAROTOMY N/A 1/31/2025    LAPAROTOMY EXPLORATORY performed by Saqib Dooley 
with good functional mobility and command following. Overall CGA for bed mobility and transfer to chair. Min A for UE dressing and Max A for LB dressing. Pt set up for breakfast with assistance for container management. Upright in chair at end of session.          PLAN :  Patient continues to benefit from skilled intervention to address the above impairments.  Continue treatment per established plan of care to address goals.    Recommend with staff: OOB to chair and bathroom with ax1 and RW, pt participation in self care    Recommend next OT session: continue POC     Recommendation for discharge: (in order for the patient to meet his/her long term goals):   High intensity/comprehensive skilled occupational therapy in a multidisciplinary setting as patient is working towards tolerating up to 3 hours of therapy/day 5-7x/week    Other factors to consider for discharge: concern for safely navigating or managing the home environment    IF patient discharges home will need the following DME: continuing to assess with progress       SUBJECTIVE:   Patient stated “Are you leaving me? ”    OBJECTIVE DATA SUMMARY:   Cognitive/Behavioral Status:  Orientation  Overall Orientation Status: Within Functional Limits  Cognition  Overall Cognitive Status: Exceptions  Attention Span: Attends with cues to redirect    Functional Mobility and Transfers for ADLs:  Bed Mobility:  Bed Mobility Training  Bed Mobility Training: Yes  Supine to Sit: Contact guard assistance  Scooting: Contact guard assistance     Transfers:   Transfer Training  Transfer Training: Yes  Sit to Stand: Contact guard assistance  Stand to Sit: Contact guard assistance  Bed to Chair: Contact guard assistance         Balance:     Balance  Sitting: Intact  Sitting - Static: Good (unsupported)  Sitting - Dynamic: Fair (occasional)  Standing: Impaired  Standing - Static: Constant support;Good  Standing - Dynamic: Constant support;Fair    ADL Intervention:       Feeding: 
Impaired  Standing - Static: Constant support;Good  Standing - Dynamic: Constant support;Fair      ADL Intervention:    LE Dressing: Maximum assistance  LE Dressing Skilled Clinical Factors: to don socks and breif    Toileting: Maximum assistance     Pain Rating:  L abdomen      Pain Intervention(s):   pain is at a level acceptable to the patient      Activity Tolerance:   Fair   Please refer to the flowsheet for vital signs taken during this treatment.    After treatment:   Patient left in no apparent distress sitting up in chair, Call bell within reach, and Bed/ chair alarm activated    COMMUNICATION/EDUCATION:   The patient's plan of care was discussed with: registered nurse         Thank you for this referral.  Sangeetha Brandon OT  Minutes: 26    
Static: Good (unsupported)  Sitting - Dynamic: Fair (occasional)  Standing: Impaired;With support  Standing - Static: Constant support;Good  Standing - Dynamic: Constant support;Fair   Ambulation/Gait Training:     Gait  Gait Training: Yes  Distance (ft): 25 Feet (12.5 feet x 2 with seated rest between attempts)  Assistive Device: Walker, rolling;Gait belt  Base of Support: Widened  Speed/Lynsey: Shuffled;Slow  Step Length: Left shortened;Right shortened  Gait Abnormalities: Decreased step clearance        Neuro Re-Education:   Patient tolerates static standing with 1-2 UE support on rolling walker for >/= 1 minute and contact guard assist.                 Pain Rating:  Patient does not rate nor complain of pain during this treatment  Pain Intervention(s):   elevation and repositioning    Activity Tolerance:   Fair , Poor, and requires rest breaks    After treatment:   Patient left in no apparent distress sitting up in chair, Call bell within reach, Bed/ chair alarm activated, Caregiver / family present, and Heels elevated for pressure relief      COMMUNICATION/EDUCATION:   The patient's plan of care was discussed with: occupational therapist    Patient Education  Education Given To: Patient;Family  Education Provided: Role of Therapy;Plan of Care;Transfer Training;Fall Prevention Strategies  Education Provided Comments: answered multiple questions re: SAH and what to expect with Rehab Plan of Care  Education Method: Verbal  Barriers to Learning: None  Education Outcome: Verbalized understanding      Keerthi Montes, PT  Minutes: 26   
feeling chair on back of legs and reaching back with 1-2 UE to slowly lower self to seated position.          [facility]-PACTM \"6 Clicks\"                                                       Daily Activity Inpatient Short Form  AM-PAC Daily Activity - Inpatient   How much help is needed for putting on and taking off regular lower body clothing?: A Lot  How much help is needed for bathing (which includes washing, rinsing, drying)?: A Lot  How much help is needed for toileting (which includes using toilet, bedpan, or urinal)?: A Lot  How much help is needed for putting on and taking off regular upper body clothing?: A Little  How much help is needed for taking care of personal grooming?: A Little  How much help for eating meals?: A Little  AM-PAC Inpatient Daily Activity Raw Score: 15  AM-PAC Inpatient ADL T-Scale Score : 34.69  ADL Inpatient CMS 0-100% Score: 56.46  ADL Inpatient CMS G-Code Modifier : CK     Interpretation of Tool:  Represents clinically-significant functional categories (i.e. Activities of daily living).  Cutoff score 39.4 (19) correlates to a good likelihood of discharging home versus a facility  Julissa Chapman, Raina Montemayor, George Pierce, Malena Borden, Phillip Kilpatrick, Garry Chapman, AM-PAC “6-Clicks” Functional Assessment Scores Predict Acute Care Hospital Discharge Destination, Physical Therapy, Volume 94, Issue 9, 1 September 2014, Pages 1072-2578, [email or web address]       Pain Rating:  Pt with mild abdominal pain   Pain Intervention(s):   patient medicated for pain prior to session    Activity Tolerance:   Fair , requires rest breaks, and diaphoretic with initial OOB mobility but resolved BP stable     After treatment:   Patient left in no apparent distress sitting up in chair, Call bell within reach, Bed/ chair alarm activated, and Caregiver / family present    COMMUNICATION/EDUCATION:   The patient's plan of care was discussed with: physical

## 2025-02-10 NOTE — PROGRESS NOTES
Hospitalist Consult Service Progress Note    NAME:   Malena Avila   : 1950   MRN: [id number]     Date/Time: 2025 9:40 AM  Patient PCP: Mingo Rosenberg MD        Assessment / Plan:    Encephalopathy-mostly resolved, was likely due to Opioids post op,   delirium overnight noted- likely Sundowning?- s/p haldol x 1 today AM  PVCs, asymptomatic, EKG 65 bpm sinus noted with non specific ST & T wave changes only  Hypertension- BP controlled  Hypothyroidism- uncontrolled with TSH 12.5 today  Hypokalemia- Resolved with K+ 3.5  Anemia-likely blood loss due to recent surgery    Can DC tele  Surgery cleared pt for Po Liquids - advance as tolerated today per them  Avoid narcotics unless pain refractory to other options  Restarted PO home meds for HTN- BP controlled now, Coreg, Losartan  Resumed home Synthroid-  cont increased dose to 100mcg daily with elevated TSH, OP TSH check in 2 weeks with PCP recommended- discussed with pt and daughter at length- understands to followup with PCP  Wean oxygen and encourage incentive spirometer  Encourage sleep at night and getting out of bed during the day to regulate circadian rhythm and prevent hospital delirium  Trend hgb, transfuse for hgb <7, Hb stable at 9.7      SBO due to Volvulus s/p Expl laparotomy + bowel resection POD # 6  Post op care per Primary surgical team  PO liquid diety noted     Code Status: Full  DVT Prophylaxis: SQ Lovenox          Subjective:     Chief Complaint / Reason for Physician Visit:  F/U for Post op AMS, PVCs, Hypothyroidism, SBO/Volvulus s/p resection, Hypokalemia,Delirium overnight after ?tramadol dose per RN  \"sundowning\".  Discussed with RN events overnight.     Pt was agitated and having sundowning overnight- ?Tramadol given per RN-  S/p 1 dose haldol today AM    Objective:     VITALS:   Last 24hrs VS reviewed since prior progress note. Most recent are:  Patient Vitals for the past 24 hrs:   BP Temp Temp src Pulse Resp SpO2 
      Hospitalist Consult Service Progress Note    NAME:   Malena Avila   : 1950   MRN: [id number]     Date/Time: 2025 8:37 AM  Patient PCP: Mingo Rosenberg MD        Assessment / Plan:    Encephalopathy-mostly resolved, was likely due to Opioids post op  PVCs, asymptomatic, EKG 65 bpm sinus noted with non specific ST & T wave changes only  Hypertension- BP controlled  Hypothyroidism- uncontrolled with TSH 12.5 today  Hypokalemia- Resolved with K+ 3.5 today  Anemia-likely blood loss due to recent surgery    Cont tele  Surgery cleared pt for Po Liquids today- will order Po K+ x 1 today as K+ on lower side  Avoid narcotics unless pain refractory to other options  Restarted PO home meds for HTN- BP controlled now, Coreg, Losartan  Resumed home Synthroid- will increase dose to 100mcg daily with elevated TSH, OP TSH check in 2 weeks with PCP recommended  Wean oxygen and encourage incentive spirometer  Encourage sleep at night and getting out of bed during the day to regulate circadian rhythm and prevent hospital delirium  Trend hgb, transfuse for hgb <7, Hb stable at 9.7      SBO due to Volvulus s/p Expl laparotomy + bowel resection POD # 5  Post op care per Primary surgical team  PO liquid diet today noted     Code Status: Full  DVT Prophylaxis: SQ Lovenox          Subjective:     Chief Complaint / Reason for Physician Visit:  F/U for Post op AMS, PVCs, Hypothyroidism, SBO/Volvulus s/p resection, Hypokalemia  \"I am ok\".  Discussed with RN events overnight.       Objective:     VITALS:   Last 24hrs VS reviewed since prior progress note. Most recent are:  Patient Vitals for the past 24 hrs:   BP Temp Temp src Pulse Resp SpO2   25 0745 128/65 97.5 °F (36.4 °C) Oral 61 20 95 %   25 -- -- -- 59 -- 95 %   25 (!) 165/93 97.3 °F (36.3 °C) Oral 74 18 97 %   25 (!) 166/82 97.9 °F (36.6 °C) Oral 69 17 97 %   25 (!) 148/70 98.1 °F (36.7 °C) Oral 68 16 98 % 
 Monitoring - Delirium Plan   Visited patient for nursing assessment of delirium, as notified on rounds to have  or risk of delirium.  This plan is made based on Screenings and Subjective Factors below.     Monitoring Tier 1. Positive screening of delirium, elopement, weakness, agitation is possible.   [facility] Nurse Dx and Interventions   Checked indicates considerations. [x] Confusion  Acute vs Chronic     [x] Open blinds during waking hours  9 AM- 1030 PM  [x] Ambulate & interact in [place]  [x] Ask pt's sleep schedule, meal, daily normal routine  Reads book before going to sleep  [] Schedule toileting   [x] Call family to support care  Son, dtr, mother (apparently still living in her 90's)  [x] Out of Bed for meals  [x] Ask Pharmacy for med re-timing wake & sleep [x] Risk for Elopement   vs Wandering    [] Out of bed once per day  [] Belt alarm  [x] Self-release Roll Belt teaching  [] Treatment contract  [x] Monitored travel to garden     [] Impaired Mobility  r/t weakness    [] Encourage food & drink intake  [] Ambulate w/device  [] Egress training  [] Passive exercise  [] Med dose adjustment of meds that impair mobility  [] Consider PT/OT consult     [x] Risk for Agitation  related to: sleep impairment, fears    [x] Hide/reinforce lines   [x] Assess & treat pain  [] Activity apron, towel folding, tasks w/hands  [] Soft music + Refer Music Therapy   [] Sleep schedule with weighted blanket  [] Give baby doll/mechanical animal  [] Refer Pet Therapy  [] Aromatherapy   [] Consider Seroquel dosing  [x] Consult spiritual care - Mandaeism   Changeable  Symptoms Difficult to redirect  Somnolence   Fear  Scans environment  Repeatedly up w/out assist Dependent mobility  Cannot complete ADLs Combativeness  continuous picking at lines     Monitoring Tier 2. Do the patient's symptoms continue after 1 hr after nursing intervention:  [x] No  [] Yes - Virtual  has started, but 
.End of Shift Note    Bedside shift change report given to Diego Shabazz (oncoming nurse) by JLUIS GREENE LPN (offgoing nurse).  Report included the following information SBAR, Kardex, ED Summary, Procedure Summary, Intake/Output, MAR, and Recent Results    Shift worked:  7-7     Shift summary and any significant changes:     Patient confused and agitated today, more remarkable in early am and evening. Haldol IV given twice this shift. Patient confused /Delerium. Sitter at bedside due to wandering. Family in today to see patient. PICC line intact, has not been removed as yet due to need to give Haldol. Eating few bites of meals today. Episodes of crying. Patient will take some daily medications , can swallow safely.   Patietn can ambulate in room with walker. Is voiding jorge colored urine. Small BM today. Incontinent.   Concerns for physician to address:  Plan of care, monitor any ordered labs, Haldol for agitation.      Zone phone for oncoming shift:   [phone]       Activity:  Level of Assistance: Minimal assist, patient does 75% or more  Number times ambulated in hallways past shift: 1  Number of times OOB to chair past shift: yes    Cardiac:   Cardiac Monitoring: No      Cardiac Rhythm: Sinus rhythm with PVC    Access:  Current line(s): PICC    Genitourinary:   Urinary Status: Voiding    Respiratory:   O2 Device: None (Room air)  Chronic home O2 use?: NO  Incentive spirometer at bedside: N/A    GI:  Last BM (including prior to admit): 02/07/25 (small brown/loose)  Current diet:  ADULT ORAL NUTRITION SUPPLEMENT; Breakfast, Lunch, Dinner; Diabetic Oral Supplement  ADULT ORAL NUTRITION SUPPLEMENT; Lunch, Dinner; Frozen Oral Supplement  ADULT DIET; Regular; Low Fiber  Passing flatus: YES    Pain Management:   Patient states pain is manageable on current regimen: N/A    Skin:  John Scale Score: 18  Interventions: Wound Offloading (Prevention Methods): Bed, pressure reduction mattress, Repositioning    Patient 
Admit Date: 2025    POD 1 Day Post-Op    Procedure:  Procedure(s):  LAPAROTOMY EXPLORATORY    Subjective:     Patient pulled NGT, iv out.  Still somewhat agitated, daughter at bedside.     Objective:     Blood pressure 134/85, pulse (!) 106, temperature 99.5 °F (37.5 °C), temperature source Axillary, resp. rate 18, height 1.549 m (5' 1\"), weight 91.2 kg (201 lb), SpO2 92%.    Temp (24hrs), Av.6 °F (37 °C), Min:97.9 °F (36.6 °C), Max:99.5 °F (37.5 °C)      Physical Exam:  GENERAL: alert, appears stated age, and cooperative, LUNG: clear to auscultation bilaterally, HEART: regular rate and rhythm, ABDOMEN: soft, ND, wound dressed and dry, EXTREMITIES:  extremities normal, atraumatic, no cyanosis or edema    Labs:   Recent Results (from the past 24 hour(s))   CBC with Auto Differential    Collection Time: 25  4:31 AM   Result Value Ref Range    WBC 10.7 3.6 - 11.0 K/uL    RBC 4.46 3.80 - 5.20 M/uL    Hemoglobin 13.1 11.5 - 16.0 g/dL    Hematocrit 38.6 35.0 - 47.0 %    MCV 86.5 80.0 - 99.0 FL    MCH 29.4 26.0 - 34.0 PG    MCHC 33.9 30.0 - 36.5 g/dL    RDW 14.5 11.5 - 14.5 %    Platelets 207 150 - 400 K/uL    MPV 10.4 8.9 - 12.9 FL    Nucleated RBCs 0.0 0  WBC    nRBC 0.00 0.00 - 0.01 K/uL    Neutrophils % 66 32.0 - 75.0 %    Band Neutrophils 21 %    Lymphocytes % 8 (L) 12.0 - 49.0 %    Monocytes % 4 (L) 5.0 - 13.0 %    Eosinophils % 0 0.0 - 7.0 %    Basophils % 0 0.0 - 1.0 %    Metamyelocytes 1 %    Immature Granulocytes % 0 0.0 - 0.5 %    Neutrophils Absolute 9.31 (H) 1.80 - 8.00 K/UL    Lymphocytes Absolute 0.86 0.80 - 3.50 K/UL    Monocytes Absolute 0.43 0.00 - 1.00 K/UL    Eosinophils Absolute 0.00 0.00 - 0.40 K/UL    Basophils Absolute 0.00 0.00 - 0.10 K/UL    Immature Granulocytes Absolute 0.00 0.00 - 0.04 K/UL    Differential Type MANUAL      RBC Comment NORMOCYTIC, NORMOCHROMIC     Basic Metabolic Panel    Collection Time: 25  4:31 AM   Result Value Ref Range    Sodium 137 136 - 145 
Admit Date: 2025    POD 2 Days Post-Op    Procedure:  Procedure(s):  LAPAROTOMY EXPLORATORY    Subjective:     Patient pulled NGT out again overnight with mittens on, refused replacement.  Denies nausea.    Objective:     Blood pressure (!) 155/79, pulse 91, temperature 98.1 °F (36.7 °C), resp. rate 16, height 1.549 m (5' 1\"), weight 91.2 kg (201 lb), SpO2 97%.    Temp (24hrs), Av.7 °F (37.1 °C), Min:98.1 °F (36.7 °C), Max:99.3 °F (37.4 °C)      Physical Exam:  GENERAL: alert, appears stated age, and cooperative, LUNG: clear to auscultation bilaterally, HEART: regular rate and rhythm, ABDOMEN: soft, ND, wound dressed and dry, EXTREMITIES:  extremities normal, atraumatic, no cyanosis or edema    Labs:   Recent Results (from the past 24 hour(s))   POCT Glucose    Collection Time: 25 11:29 AM   Result Value Ref Range    POC Glucose 103 65 - 117 mg/dL    Performed by: El Bartlett PCT    POCT Glucose    Collection Time: 25  5:08 PM   Result Value Ref Range    POC Glucose 97 65 - 117 mg/dL    Performed by: El Bartlett PCT    POCT Glucose    Collection Time: 25 12:10 AM   Result Value Ref Range    POC Glucose 98 65 - 117 mg/dL    Performed by: Julito Farmer PCT    CBC with Auto Differential    Collection Time: 25  5:28 AM   Result Value Ref Range    WBC 7.0 3.6 - 11.0 K/uL    RBC 3.83 3.80 - 5.20 M/uL    Hemoglobin 11.3 (L) 11.5 - 16.0 g/dL    Hematocrit 35.3 35.0 - 47.0 %    MCV 92.2 80.0 - 99.0 FL    MCH 29.5 26.0 - 34.0 PG    MCHC 32.0 30.0 - 36.5 g/dL    RDW 14.7 (H) 11.5 - 14.5 %    Platelets 145 (L) 150 - 400 K/uL    MPV 10.3 8.9 - 12.9 FL    Nucleated RBCs 0.0 0  WBC    nRBC 0.00 0.00 - 0.01 K/uL    Neutrophils % 86 (H) 32.0 - 75.0 %    Band Neutrophils 2 %    Lymphocytes % 8 (L) 12.0 - 49.0 %    Monocytes % 4 (L) 5.0 - 13.0 %    Eosinophils % 0 0.0 - 7.0 %    Basophils % 0 0.0 - 1.0 %    Immature Granulocytes % 0 0.0 - 0.5 %    Neutrophils Absolute 6.16 1.80 - 8.00 K/UL    
Admit Date: 2025    POD 3 Days Post-Op    Procedure:  Procedure(s):  LAPAROTOMY EXPLORATORY    Subjective:     Patient had episode of emesis overnight, improved with compazine.  Had large BM this morning.    Objective:     Blood pressure (!) 154/78, pulse 78, temperature 97.7 °F (36.5 °C), resp. rate 16, height 1.549 m (5' 1\"), weight 91.2 kg (201 lb), SpO2 96%.    Temp (24hrs), Av.8 °F (36.6 °C), Min:97.7 °F (36.5 °C), Max:98.1 °F (36.7 °C)      Physical Exam:  GENERAL: alert, appears stated age, and cooperative, LUNG: clear to auscultation bilaterally, HEART: regular rate and rhythm, ABDOMEN: soft, ND, wound dressed and dry, EXTREMITIES:  extremities normal, atraumatic, no cyanosis or edema    Labs:   No results found for this or any previous visit (from the past 24 hour(s)).      Data Review images and reports reviewed    Assessment:     Principal Problem:    SBO (small bowel obstruction) (HCC)  Resolved Problems:    * No resolved hospital problems. *      Plan/Recommendations/Medical Decision Making:     Continue present treatment  Sips of clear liquids only for now  Increase activity  OOB to chair and ambulate with assist  IS   PICC line and TPN    Saqib Dooley MD  [facility]      
Admit Date: 2025    POD 4 Days Post-Op    Procedure:  Procedure(s):  LAPAROTOMY EXPLORATORY    Subjective:     Patient feeling better, no more nausea, Bms more solid now    Objective:     Blood pressure 137/70, pulse 75, temperature 98.4 °F (36.9 °C), temperature source Oral, resp. rate 20, height 1.549 m (5' 0.98\"), weight 91.2 kg (201 lb), SpO2 95%.    Temp (24hrs), Av.1 °F (36.7 °C), Min:97.5 °F (36.4 °C), Max:98.4 °F (36.9 °C)      Physical Exam:  GENERAL: alert, appears stated age, and cooperative, LUNG: clear to auscultation bilaterally, HEART: regular rate and rhythm, ABDOMEN: soft, ND, wound dressed and dry, EXTREMITIES:  extremities normal, atraumatic, no cyanosis or edema    Labs:   Recent Results (from the past 24 hour(s))   Culture, Urine    Collection Time: 25  5:23 PM    Specimen: Urine    Purewick   Result Value Ref Range    Special Requests NO SPECIAL REQUESTS      Culture PENDING    Urinalysis    Collection Time: 25  5:23 PM   Result Value Ref Range    Color, UA YELLOW/STRAW      Appearance CLEAR CLEAR      Specific Gravity, UA 1.014      pH, Urine 6.0 5.0 - 8.0      Protein, UA 30 (A) NEG mg/dL    Glucose, Ur Negative NEG mg/dL    Ketones, Urine Negative NEG mg/dL    Bilirubin, Urine Negative NEG      Blood, Urine MODERATE (A) NEG      Urobilinogen, Urine 1.0 0.2 - 1.0 EU/dL    Nitrite, Urine Negative NEG      Leukocyte Esterase, Urine SMALL (A) NEG      WBC, UA 20-50 0 - 4 /hpf    RBC, UA 5-10 0 - 5 /hpf    Epithelial Cells, UA MODERATE (A) FEW /lpf    BACTERIA, URINE Negative NEG /hpf    Hyaline Casts, UA 0-2 0 - 2 /lpf   Phosphorus    Collection Time: 25  4:56 AM   Result Value Ref Range    Phosphorus 1.7 (L) 2.6 - 4.7 MG/DL   Magnesium    Collection Time: 25  4:56 AM   Result Value Ref Range    Magnesium 2.0 1.6 - 2.4 mg/dL   POCT Glucose    Collection Time: 25  4:57 AM   Result Value Ref Range    POC Glucose 108 65 - 117 mg/dL    Performed by: Vinicius 
Admit Date: 2025    Subjective:     Patient vomiting, very uncomfortable this morning.  Refused attempt at NGT by gen surg nurse yesterday.     Objective:     Blood pressure (!) 155/80, pulse 92, temperature 98.4 °F (36.9 °C), temperature source Oral, resp. rate 14, height 1.549 m (5' 1\"), weight 91.2 kg (201 lb), SpO2 92%.    Temp (24hrs), Av.7 °F (36.5 °C), Min:97.5 °F (36.4 °C), Max:98.4 °F (36.9 °C)      Physical Exam:  GENERAL: alert, appears stated age, and cooperative, LUNG: clear to auscultation bilaterally, HEART: regular rate and rhythm, ABDOMEN: soft, distended, tender, EXTREMITIES:  extremities normal, atraumatic, no cyanosis or edema    Labs:   Recent Results (from the past 24 hour(s))   Urinalysis with Reflex to Culture    Collection Time: 25  8:39 PM    Specimen: Urine   Result Value Ref Range    Color, UA YELLOW/STRAW      Appearance CLEAR CLEAR      Specific Gravity, UA 1.015 1.003 - 1.030      pH, Urine 6.0 5.0 - 8.0      Protein, UA Negative NEG mg/dL    Glucose, Ur 100 (A) NEG mg/dL    Ketones, Urine Negative NEG mg/dL    Bilirubin, Urine Negative NEG      Blood, Urine Negative NEG      Urobilinogen, Urine 0.2 0.2 - 1.0 EU/dL    Nitrite, Urine Negative NEG      Leukocyte Esterase, Urine TRACE (A) NEG      Urine Culture if Indicated CULTURE NOT INDICATED BY UA RESULT CNI      WBC, UA 0-4 0 - 4 /hpf    RBC, UA 0-5 0 - 5 /hpf    Epithelial Cells, UA MODERATE (A) FEW /lpf    BACTERIA, URINE Negative NEG /hpf    Hyaline Casts, UA 0-2 0 - 2 /lpf   Phosphorus    Collection Time: 25  1:39 AM   Result Value Ref Range    Phosphorus 2.8 2.6 - 4.7 MG/DL   CBC with Auto Differential    Collection Time: 25  1:39 AM   Result Value Ref Range    WBC 16.4 (H) 3.6 - 11.0 K/uL    RBC 4.74 3.80 - 5.20 M/uL    Hemoglobin 14.1 11.5 - 16.0 g/dL    Hematocrit 42.3 35.0 - 47.0 %    MCV 89.2 80.0 - 99.0 FL    MCH 29.7 26.0 - 34.0 PG    MCHC 33.3 30.0 - 36.5 g/dL    RDW 14.0 11.5 - 14.5 %    
Comprehensive Nutrition Assessment    Type and Reason for Visit:  Initial    Nutrition Recommendations/Plan:   Diet per surgery  PN:   Today, rec 100g dextrose, 42.5g AA  If tolerated, and diet not advanced, can advance to goal rate of:   210g dextrose, 70g AA, +250mL 20% SMOF lipids daily (1494kcal/70g pro)  Monitor and record intakes and Bms in I/Os     Malnutrition Assessment:  Malnutrition Status:  At risk for malnutrition (02/03/25 1141)    Context:  Acute Illness     Findings of the 6 clinical characteristics of malnutrition:  Energy Intake:  50% or less of estimated energy requirements for 5 or more days  Weight Loss:  No weight loss     Body Fat Loss:  Unable to assess     Muscle Mass Loss:  Unable to assess    Fluid Accumulation:  No fluid accumulation     Strength:  Not Performed    Nutrition Assessment:    Admitted for SBO. Now s/p small bowel resection. PMHx includes CAD, fibromyalgia, GERD, HTN, hypothyroidism, pancreatitis, thyroid disease, TMJ. Verbal consult for new start TPN. At interview attempt, pt recieving care. S/p PICC. NGT pulled by pt. Noted emesis overnight. Discussed PN rec's with pharmacy. Rec's above.    Nutrition Related Findings:    Labs: Na 140, K 3.9, BUN 20, Creat 0.75, Gluc 74. Meds: pantoprazole, prochlorperazine, D5% and 0.45% NaCl with KCl 20mEq, ondansetron. 2+ generalized, b/l LE edema. BM 1/29. Wound Type: Surgical Incision       Current Nutrition Intake & Therapies:    Average Meal Intake: NPO  Average Supplements Intake: NPO  Diet NPO Exceptions are: Ice Chips, Sips of Water with Meds, Sips of Clear Liquids, Popsicles    Anthropometric Measures:  Height: 154.9 cm (5' 0.98\")  Ideal Body Weight (IBW): 105 lbs (48 kg)    Current Body Weight: 91.2 kg (201 lb 1 oz), 191.5 % IBW. Weight Source: Bed scale  Current BMI (kg/m2): 38  BMI Categories: Obese Class 2 (BMI 35.0 -39.9)    Estimated Daily Nutrient Needs:  Energy Requirements Based On: Kcal/kg  Weight Used for Energy 
Comprehensive Nutrition Assessment    Type and Reason for Visit:  Reassess    Nutrition Recommendations/Plan:   Diet per surgery  Glucerna (chocolate) 3x/day, magic cup 2x/day  Once diet advanced to solids, rec wean TPN (poor intakes of full liquids).   Goal TPN of: 210g dextrose, 70g AA, +250mL 20% SMOF lipids daily (1494kcal/70g pro)   Monitor and record PO intakes, supplement acceptance, and Bms in I/Os     Malnutrition Assessment:  Malnutrition Status:  At risk for malnutrition (02/03/25 1141)    Context:  Acute Illness     Findings of the 6 clinical characteristics of malnutrition:  Energy Intake:  50% or less of estimated energy requirements for 5 or more days  Weight Loss:  No weight loss     Body Fat Loss:  Unable to assess     Muscle Mass Loss:  Unable to assess    Fluid Accumulation:  No fluid accumulation     Strength:  Not Performed    Nutrition Assessment:    Follow up. Spoke with patient and family at bedside. They report pt dislikes most foods on full liquid diet (specifically cream of wheat, jello, broth). Likes apple sauce and pudding, prefers glucerna to ensure. Agreeable to magic cup. Updated preferences and ONS. Intakes poor.    Nutrition Related Findings:    Labs: Na 138, K 3.5, B N8, Creat 0.5, Gluc 113, Mag 2.1, Phos 2.6. Meds: insulin lispro, pantoprazole, potassium bicarb-citric acid, D5% and 0.45% NaCl, ondansetron. 1+ generalized b/l LE edema. Wound Type: Surgical Incision       Current Nutrition Intake & Therapies:    Average Meal Intake: 1-25%  Average Supplements Intake: 0%  PN-Adult 2-in-1 Central Line (Custom)  ADULT ORAL NUTRITION SUPPLEMENT; Breakfast, Lunch, Dinner; Diabetic Oral Supplement  ADULT DIET; Full Liquid; dislikes cream of wheat, jello, broth. Likes apple sauce, pudding  ADULT ORAL NUTRITION SUPPLEMENT; Lunch, Dinner; Fortified Gelatin Oral Supplement    Anthropometric Measures:  Height: 154.9 cm (5' 0.98\")  Ideal Body Weight (IBW): 105 lbs (48 kg)    Current Body 
End of Shift Note    Bedside shift change report given to ARYA Bee  (oncoming nurse) by Jumana Chavez RN (offgoing nurse).  Report included the following information SBAR    Shift worked:  7P-7A     Shift summary and any significant changes:    Patient was calm and slept for the most part of the shift.  [facility]ible [facility] to [facility] today.  Uneventful.       Concerns for physician to address:       Zone phone for oncoming shift:   [phone]       Activity:  Level of Assistance: Standby assist, set-up cues, supervision of patient - no hands on  Number times ambulated in hallways past shift: 1  Number of times OOB to chair past shift: 1    Cardiac:   Cardiac Monitoring: No      Cardiac Rhythm: Sinus rhythm with PVC    Access:  Current line(s): PICC    Genitourinary:   Urinary Status: Voiding, Bathroom privileges, Incontinent briefs    Respiratory:   O2 Device: None (Room air)  Chronic home O2 use?: NO  Incentive spirometer at bedside: YES    GI:  Last BM (including prior to admit): 02/08/25  Current diet:  ADULT ORAL NUTRITION SUPPLEMENT; Breakfast, Lunch, Dinner; Diabetic Oral Supplement  ADULT ORAL NUTRITION SUPPLEMENT; Lunch, Dinner; Frozen Oral Supplement  ADULT DIET; Regular; Low Fiber  Passing flatus: YES    Pain Management:   Patient states pain is manageable on current regimen: YES    Skin:  John Scale Score: 20  Interventions: Wound Offloading (Prevention Methods): Pillows, Repositioning    Patient Safety:  Fall Risk: Nursing Judgement-Fall Risk High(Add Comments): No  Fall Risk Interventions  Nursing Judgement-Fall Risk High(Add Comments): No  Toilet Every 2 Hours-In Advance of Need: Yes  Hourly Visual Checks: Awake  Fall Visual Posted: Socks, Fall sign posted  Room Door Open: Yes  Alarm On: Bed, Chair  Patient Moved Closer to Nursing Station: No    Active Consults:   IP CONSULT TO VASCULAR ACCESS TEAM  IP CONSULT TO HOSPITALIST  IP CONSULT TO PHARMACY  IP CONSULT TO SPIRITUAL CARE    Length of 
End of Shift Note    Bedside shift change report given to RUTH Andrade (oncoming nurse) by Tori Lambert LPN (offgoing nurse).  Report included the following information SBAR    Shift worked:  7a-7p     Shift summary and any significant changes:    Pt up to the chair for first part of the day, tolerating ambulating poorly. Pt has weakness BLE. Salmon draining and intact. Pt tolerating sips of clears, no complaints of nausea or episodes of vomiting.      Concerns for physician to address: none     Zone phone for oncoming shift:  [phone]       Tori Lambert LPN                            
End of Shift Note    Bedside shift change report given to RUTH Andrade (oncoming nurse) by Zeeshan Corbett RN (offgoing nurse).  Report included the following information SBAR, Intake/Output, MAR, and Recent Results    Shift worked:  7a-730p     Shift summary and any significant changes:    Surgeon removed SHAWN dressing - midline incision with staples currently CECILIO. No c/o pain, n/v. Up with standby assistance to bathroom. Ambulated in hallway with walker multiple times - tolerated well. Sitter remains at bedside. Otherwise, uneventful.      Concerns for physician to address:  D/c q6 blood sugar checks with no TPN running?     Zone phone for oncoming shift:          Activity:     Number times ambulated in hallways past shift: 1  Number of times OOB to chair past shift: 4    Cardiac:   Cardiac Monitoring: No           Access:   Current line(s): Peripheral IV    Genitourinary:   Urinary status: Patient is voiding with difficulty. - incontinent     Respiratory:      Chronic home O2 use?: NO  Incentive spirometer at bedside: YES       GI:     Current diet:  ADULT ORAL NUTRITION SUPPLEMENT; Breakfast, Lunch, Dinner; Diabetic Oral Supplement  ADULT ORAL NUTRITION SUPPLEMENT; Lunch, Dinner; Frozen Oral Supplement  ADULT DIET; Regular; Low Fiber  Passing flatus: Yes  Tolerating current diet: Yes       Pain Management:   Patient states pain is manageable on current regimen: Yes    Skin:     Interventions:     Patient Safety:  Fall Score: PA Fall Risk Score: 86.96    Interventions:           Length of Stay:  Expected LOS: 11  Actual LOS: 9      Zeeshan Corbett RN                            
End of Shift Note    Bedside shift change report given to RUTH Fletcher (oncoming nurse) by Lazaro Aranda RN (offgoing nurse).  Report included the following information SBAR, Kardex, Procedure Summary, Intake/Output, MAR, Recent Results, and Med Rec Status    Shift worked:  7a-730p     Shift summary and any significant changes:     In between bed and chair today. Treated pain prn-see MAR. Treated anxiety w/ Ativan. Tolerating small amount of liquids.   Q2hr turned in bed.   FMS was dislodged after pt had a hard BM-ok per Dr. Dooley to leave out.      Concerns for physician to address:  Pt's daughter is worried about pt's mentality being not her baseline.      Zone phone for oncoming shift:   [phone]       Activity:     Number times ambulated in hallways past shift: 0  Number of times OOB to chair past shift: 2    Cardiac:   Cardiac Monitoring: Yes           Access:   Current line(s): PICC     Genitourinary:   Urinary status: Patient is voiding without difficulty.    Respiratory:      Chronic home O2 use?: NO  Incentive spirometer at bedside: YES       GI:     Current diet:  DIET ONE TIME MESSAGE;  ADULT DIET; Clear Liquid  PN-Adult 2-in-1 Central Line (Custom)  Passing flatus: Yes  Tolerating current diet: Yes       Pain Management:   Patient states pain is manageable on current regimen: Yes    Skin:     Interventions:     Patient Safety:  Fall Score: PA Fall Risk Score: 88.45    Interventions:           Length of Stay:  Expected LOS: 7  Actual LOS: 5      Lazaro Aranda RN                            
End of Shift Note    Bedside shift change report given to RUTH Garvey (oncoming nurse) by Heidi Corbett RN (offgoing nurse).  Report included the following information SBAR, Intake/Output, MAR, and Recent Results    Shift worked:  7pm-7am     Shift summary and any significant changes:    Patient remains with severe abd pain most of the night. Dilaudid was given 2x.   Reported of nausea but no vomiting.   NG tube was unsuccessfully inserted 5x in the ED.   Pt's voiding to the bathroom, able to ambulate with 1 assist, with use of walker.   No passing of gas. No BM.   Concerns for physician to address:       Zone phone for oncoming shift:       Activity:     Number times ambulated in hallways past shift: 0  Number of times OOB to chair past shift: 0    Cardiac:   Cardiac Monitoring: No           Access:  Current line(s): PIV     Genitourinary:   Urinary Status: Voiding, Bathroom privileges    Respiratory:   O2 Device: Nasal cannula  Chronic home O2 use?: NO  Incentive spirometer at bedside: YES    GI:  Last BM (including prior to admit): 01/29/25  Current diet:  Diet NPO Exceptions are: Sips of Water with Meds, Ice Chips  Passing flatus: NO    Pain Management:   Patient states pain is manageable on current regimen: NO    Skin:  John Scale Score: 18  Interventions:      Patient Safety:  Fall Risk: Nursing Judgement-Fall Risk High(Add Comments): Yes  Fall Risk Interventions  Nursing Judgement-Fall Risk High(Add Comments): Yes  Toilet Every 2 Hours-In Advance of Need: Yes  Hourly Visual Checks: Awake, Quiet  Fall Visual Posted: Socks, Fall sign posted  Room Door Open: Yes  Alarm On: Bed    Active Consults:   None    Length of Stay:  Expected LOS: 2  Actual LOS: 1    Heidi Corbett RN                            
End of Shift Note    Bedside shift change report given to RUTH Glover (oncoming nurse) by Zeeshan Corbett RN (offgoing nurse).  Report included the following information SBAR, Intake/Output, MAR, and Recent Results    Shift worked:  7a-730p     Shift summary and any significant changes:     Pt had 4 large loose Bms before 11am requiring full linen changes each time - diginishield rectal tube placed with semi success. Draining adequately with small amounts of leakage around the edges but much better than earlier. Pt propped on side with pillow support to aid drainage. Pt kruse was removed and pt passed voiding trial - urine culture and urinalysis sent to lab. Voiding with aid of basilia Zamarripa NP approved. Phlebotomy wm extra labs placed and sent to lab. No c/o pain, n/v.     Concerns for physician to address:       Zone phone for oncoming shift:          Activity:     Number times ambulated in hallways past shift: 0  Number of times OOB to chair past shift: 0    Cardiac:   Cardiac Monitoring: Yes           Access:   Current line(s): Peripheral IV    Genitourinary:   Urinary status: Patient is voiding with difficulty. - incontinent     Respiratory:      Chronic home O2 use?: NO  Incentive spirometer at bedside: YES       GI:     Current diet:  Diet NPO Exceptions are: Ice Chips, Sips of Water with Meds, Sips of Clear Liquids, Popsicles  PN-Adult 2-in-1 Central Line (Custom)  Passing flatus: Yes  Tolerating current diet: Yes       Pain Management:   Patient states pain is manageable on current regimen: Yes    Skin:     Interventions:     Patient Safety:  Fall Score: PA Fall Risk Score: 80.94    Interventions:           Length of Stay:  Expected LOS: 7  Actual LOS: 4      Zeeshan Corbett RN                            
End of Shift Note    Bedside shift change report given to RUTH Gordon (oncoming nurse) by Lazaro Aranda RN (offgoing nurse).  Report included the following information SBAR, Kardex, Procedure Summary, Intake/Output, MAR, and Recent Results    Shift worked:  3p-730p     Shift summary and any significant changes:     Assumed care this evening. Pt became hypotensive and tachycardic post gastrografin administration. 500cc bolus started. Pt down to stat CT-per report, pt to go back to emergency surgery for obstruction.     Concerns for physician to address:       Zone phone for oncoming shift:   [phone]       Activity:     Number times ambulated in hallways past shift: 0  Number of times OOB to chair past shift: 0    Cardiac:   Cardiac Monitoring: No           Access:   Current line(s): Peripheral IV    Genitourinary:   Urinary status: Patient is voiding without difficulty.    Respiratory:      Chronic home O2 use?: NO  Incentive spirometer at bedside: YES       GI:     Current diet:  Diet NPO Exceptions are: Sips of Water with Meds, Ice Chips  Passing flatus: Yes  Tolerating current diet: No       Pain Management:   Patient states pain is manageable on current regimen: Yes    Skin:     Interventions:     Patient Safety:  Fall Score: PA Fall Risk Score: 49.64    Interventions:           Length of Stay:  Expected LOS: 4  Actual LOS: 1      Lazaro Aranda RN                            
End of Shift Note    Bedside shift change report given to RUTH Lofton (oncoming nurse) by Lazaro Aranda RN (offgoing nurse).  Report included the following information SBAR, Kardex, OR Summary, Procedure Summary, Intake/Output, MAR, and Recent Results    Shift worked:  7a-730p     Shift summary and any significant changes:     Prior to initial assessment, pt pulled out NGT, IV, and tele monitor.   IV replaced to RC. NGT replaced-draining w/o issue. Treated pain prn-see MAR. Turned q2h. Salmon draining w/o issue.     **Obtained order for continuing Salmon today as it was placed last night during surgery (1/31) and had no order w/ it.      Concerns for physician to address:  Pt too weak to get OOB. Would benefit w/ PT maybe OT as well.      Zone phone for oncoming shift:   [phone]       Activity:     Number times ambulated in hallways past shift: 0  Number of times OOB to chair past shift: 0    Cardiac:   Cardiac Monitoring: Yes           Access:   Current line(s): Peripheral IV    Genitourinary:   Urinary status: Indwelling catheter is draining well.    Respiratory:      Chronic home O2 use?: NO  Incentive spirometer at bedside: YES       GI:     Current diet:  Diet NPO  Passing flatus: No  Tolerating current diet: No       Pain Management:   Patient states pain is manageable on current regimen: Yes    Skin:     Interventions:     Patient Safety:  Fall Score: PA Fall Risk Score: 88.63    Interventions:           Length of Stay:  Expected LOS: 4  Actual LOS: 2      Lazaro Aranda RN                            
End of Shift Note    Bedside shift change report given to RUTH Odell (oncoming nurse) by Aspen Colvin RN (offgoing nurse).  Report included the following information SBAR, Kardex, ED Summary, OR Summary, Procedure Summary, Intake/Output, MAR, Recent Results, and Cardiac Rhythm NSR    Shift worked:  9725-1587     Shift summary and any significant changes:     Pt pain managed w/ 2x dose of PRN dilaudid, pt resting well after.     Pt turning self well, resting on side, bed alarm in place.     Voiding well via external catheter.    FMS in place, scant leakage.    PICC line care completed, CHG bed bath and linen change completed. AM labs drawn.     Concerns for physician to address:  Tele orders      Zone phone for oncoming shift:   [phone]           Aspen Colvin RN                            
End of Shift Note    Bedside shift change report given to RUTH Odell (oncoming nurse) by Aspen Colvin RN (offgoing nurse).  Report included the following information SBAR, Kardex, ED Summary, OR Summary, Procedure Summary, Intake/Output, MAR, Recent Results, and Cardiac Rhythm NSR w/ PVCs/Quadrigeminy    Shift worked:  0575-5565     Shift summary and any significant changes:     Tele alerted to abnormal heart rhythm at approx 2000, checked vital signs, BG, and ordered a stat EKG and contacted attending, redirected to hospitalist - hospitalist consult incomplete - communicated concerns to admitting hospitalist, at bedside w/ new orders.    Pt weaned from 3L NC to room air over shift. IS at bedside.    Pt taken off purwick, standing & pivoting to bedside commode w/ 1x assist & front wheel walker.    Pt continent of bowel and bladder, voiding well - UA w/ reflex collected and sent. Stools loose but with some form.    AM labs drawn, PICC line care completed.    SHAWN dressing w/ scant drainage - no change over shift, otherwise CDI.     Concerns for physician to address:       Zone phone for oncoming shift:   [phone]           Aspen Colvin RN                            
End of Shift Note    Bedside shift change report given to RUTH Pendleton (oncoming nurse) by Lazaro Aranda RN (offgoing nurse).  Report included the following information SBAR, Kardex, Procedure Summary, Intake/Output, MAR, and Recent Results    Shift worked:  7a-730p     Shift summary and any significant changes:     In between bed and chair. Pt's movement is getting better. Treated pain w/ scheduled Tylenol+Toradol. Tolerating FLD. Getting up to bathroom w/ assistance.      Concerns for physician to address:  Per pt and family, starting to have more frequent BM's.      Zone phone for oncoming shift:          Activity:     Number times ambulated in hallways past shift: 0  Number of times OOB to chair past shift: 2    Cardiac:   Cardiac Monitoring: Yes           Access:   Current line(s): PICC     Genitourinary:   Urinary status: Patient is voiding without difficulty.    Respiratory:      Chronic home O2 use?: NO  Incentive spirometer at bedside: YES       GI:     Current diet:  PN-Adult 2-in-1 Central Line (Custom)  ADULT ORAL NUTRITION SUPPLEMENT; Breakfast, Lunch, Dinner; Diabetic Oral Supplement  ADULT DIET; Full Liquid; dislikes cream of wheat, jello, broth. Likes apple sauce, pudding  ADULT ORAL NUTRITION SUPPLEMENT; Lunch, Dinner; Frozen Oral Supplement  Passing flatus: Yes  Tolerating current diet: Yes       Pain Management:   Patient states pain is manageable on current regimen: Yes    Skin:     Interventions:     Patient Safety:  Fall Score: PA Fall Risk Score: 79.51    Interventions:           Length of Stay:  Expected LOS: 8  Actual LOS: 7      Lazaro Aranda RN                            
End of Shift Note    Bedside shift change report given to RUTH Soto (oncoming nurse) by Keerthi Carson RN (offgoing nurse).  Report included the following information SBAR, Kardex, Procedure Summary, Intake/Output, MAR, and Recent Results    Shift worked:  7p-7a     Shift summary and any significant changes:     Very eventful night, some intermittent confusion, declined all meds until this morning, Education given but still decline majority of care. Code bet call for aggressive behavior towards staff and also putting self at risk of fall and injury.  Assisted one time to the bathroom and had two episode of bowel incontinent. CHG bathed and line care done. Continue to monitor patent, call bell and phone within reach, patient not following with safety precautions in place. Med ordered to calm her down and was given by oncoming nurse. Patient's son is currently in room  with patient.    Concerns for physician to address:  Per pt and family, starting to have more frequent BM's.      Zone phone for oncoming shift:          Activity:     Number times ambulated in hallways past shift: 0  Number of times OOB to chair past shift: 4    Cardiac:   Cardiac Monitoring: Yes           Access:   Current line(s): PICC     Genitourinary:   Urinary status: Patient is voiding without difficulty.    Respiratory:      Chronic home O2 use?: NO  Incentive spirometer at bedside: YES       GI:     Current diet:  ADULT ORAL NUTRITION SUPPLEMENT; Breakfast, Lunch, Dinner; Diabetic Oral Supplement  ADULT DIET; Full Liquid; dislikes cream of wheat, jello, broth. Likes apple sauce, pudding  ADULT ORAL NUTRITION SUPPLEMENT; Lunch, Dinner; Frozen Oral Supplement  Passing flatus: Yes  Tolerating current diet: Yes       Pain Management:   Patient states pain is manageable on current regimen: Yes    Skin:     Interventions:     Patient Safety:  Fall Score: PA Fall Risk Score: 76.77    Interventions:           Length of Stay:  Expected LOS: 
End of Shift Note    Bedside shift change report given to RUTH Willis (oncoming nurse) by Heidi Corbett RN (offgoing nurse).  Report included the following information SBAR, Kardex, Intake/Output, MAR, and Recent Results    Shift worked:  7pm-7am     Shift summary and any significant changes:    Patient arrived in the unit around 2300 from PACU. NG tube noted at 55cm, intact and draining brown output connected to continuous suction , total of 250cc during the shift.   With mid abd incision with intact price dressing.   Salmon intact. PRN dilaudid given 2x.   No bowel sounds. No passing of gas. No BM during shift. Still drowsy and weak from sx, haven't got up out of the bed. Instructed to use incentive spirometer.    Concerns for physician to address:       Zone phone for oncoming shift:       Activity:  Level of Assistance: Standby assist, set-up cues, supervision of patient - no hands on  Number times ambulated in hallways past shift: 0  Number of times OOB to chair past shift: 0    Cardiac:   Cardiac Monitoring: Yes      Cardiac Rhythm: Sinus rhythm    Access:  Current line(s): PIV     Genitourinary:   Urinary Status: Draining, Salmon    Respiratory:   O2 Device: Nasal cannula  Chronic home O2 use?: NO  Incentive spirometer at bedside: YES    GI:  Last BM (including prior to admit): 01/29/25  Current diet:  Diet NPO  Passing flatus: NO    Pain Management:   Patient states pain is manageable on current regimen: YES    Skin:  John Scale Score: 18  Interventions: Wound Offloading (Prevention Methods): Bed, pressure reduction mattress, Pillows, Repositioning, Turning    Patient Safety:  Fall Risk: Nursing Judgement-Fall Risk High(Add Comments): Yes  Fall Risk Interventions  Nursing Judgement-Fall Risk High(Add Comments): Yes  Toilet Every 2 Hours-In Advance of Need: No (Comment)  Hourly Visual Checks: In bed, Eyes closed  Fall Visual Posted: Socks, Fall sign posted  Room Door Open: Yes  Alarm On: Bed    Active 
End of Shift Note    Bedside shift change report given to RUTH morley (oncoming nurse) by Zeeshan Corbett RN (offgoing nurse).  Report included the following information SBAR, Intake/Output, MAR, and Recent Results    Shift worked:  7a-730p     Shift summary and any significant changes:    Sitter removed from room at 1025 2/9 successfully. Up with standby assistance to bathroom. Ambulated in hallway with walker multiple times - tolerated well. No c/o pain, n/v. PICC line still in place - did not want to pull line in case IV meds were needed prior to d/c.     Concerns for physician to address:       Zone phone for oncoming shift:          Activity:     Number times ambulated in hallways past shift: 3  Number of times OOB to chair past shift: 3    Cardiac:   Cardiac Monitoring: No           Access:   Current line(s): PICC     Genitourinary:   Urinary status: Patient is voiding with difficulty. - incontinent    Respiratory:      Chronic home O2 use?: NO  Incentive spirometer at bedside: YES       GI:     Current diet:  ADULT ORAL NUTRITION SUPPLEMENT; Breakfast, Lunch, Dinner; Diabetic Oral Supplement  ADULT ORAL NUTRITION SUPPLEMENT; Lunch, Dinner; Frozen Oral Supplement  ADULT DIET; Regular; Low Fiber  Passing flatus: Yes  Tolerating current diet: Yes       Pain Management:   Patient states pain is manageable on current regimen: Yes    Skin:     Interventions:     Patient Safety:  Fall Score: PA Fall Risk Score: 61.7    Interventions:           Length of Stay:  Expected LOS: 11  Actual LOS: 10      Zeeshan Corbett RN                            
End of Shift Note    Bedside shift change report given to Zeeshan (oncoming nurse) by Lupe Phillips RN (offgoing nurse).  Report included the following information SBAR, Kardex, Intake/Output, MAR, and Recent Results    Shift worked:  1900-0730     Shift summary and any significant changes:     Pt mentation better overnight. Active order for restraints until 2359 on 2/2 but pt not restrained from the time care assumed. Unknown when restraints were removed. Pt mentation much improved at this time; pt A/O x3 as of report to oncoming shift with no need for new restraint order. Pt presenting with dark, tarry stools beginning about 5 AM, small amount. Pt passed larger BM during bedside report to oncoming shift, again dark and tarry in nature. Surgical NP notified by day RN.      Concerns for physician to address:  Dark stools     Zone phone for oncoming shift:          Activity:  Level of Assistance: Moderate assist, patient does 50-74%  Number times ambulated in hallways past shift: 0  Number of times OOB to chair past shift: 0    Cardiac:   Cardiac Monitoring: Yes      Cardiac Rhythm: Sinus rhythm    Access:  Current line(s): PIV     Genitourinary:   Urinary Status: Draining, Salmon    Respiratory:   O2 Device: Nasal cannula  Chronic home O2 use?: NO  Incentive spirometer at bedside: YES    GI:  Last BM (including prior to admit): 01/29/25  Current diet:  Diet NPO Exceptions are: Ice Chips, Sips of Water with Meds, Sips of Clear Liquids, Popsicles  Passing flatus: YES    Pain Management:   Patient states pain is manageable on current regimen: YES    Skin:  John Scale Score: 20  Interventions: Wound Offloading (Prevention Methods): Bed, pressure reduction mattress, Elevate heels, Pillows, Repositioning, Turning    Patient Safety:  Fall Risk: Nursing Judgement-Fall Risk High(Add Comments): Yes  Fall Risk Interventions  Nursing Judgement-Fall Risk High(Add Comments): Yes  Toilet Every 2 Hours-In Advance of Need: 
End of Shift Note    Bedside shift change report given to Zeeshan MIRANDA (oncoming nurse) by Consuelo Ghotra RN (offgoing nurse).  Report included the following information SBAR, Intake/Output, MAR, and Recent Results    Shift worked:  7P-7A     Shift summary and any significant changes:     Patient tolerated care well. Ambulated in connolly x4 with sitter and walker. Scheduled medications given per MAR. No complaint of pain nor N/V. No significant changes overnight.      Concerns for physician to address:     Zone phone for oncoming shift:        Activity:  Level of Assistance: Standby assist, set-up cues, supervision of patient - no hands on  Number times ambulated in hallways past shift: 4  Number of times OOB to chair past shift: 2    Cardiac:   Cardiac Monitoring: No      Cardiac Rhythm: Sinus rhythm with PVC    Access:  Current line(s): PICC    Genitourinary:   Urinary Status: Voiding, Incontinent briefs    Respiratory:   O2 Device: None (Room air)  Chronic home O2 use?: NO  Incentive spirometer at bedside: NO    GI:  Last BM (including prior to admit): 02/08/25  Current diet:  ADULT ORAL NUTRITION SUPPLEMENT; Breakfast, Lunch, Dinner; Diabetic Oral Supplement  ADULT ORAL NUTRITION SUPPLEMENT; Lunch, Dinner; Frozen Oral Supplement  ADULT DIET; Regular; Low Fiber  Passing flatus: YES    Pain Management:   Patient states pain is manageable on current regimen: YES    Skin:  John Scale Score: 18  Interventions: Wound Offloading (Prevention Methods): Repositioning, Turning, Pillows    Patient Safety:  Fall Risk: Nursing Judgement-Fall Risk High(Add Comments): No  Fall Risk Interventions  Nursing Judgement-Fall Risk High(Add Comments): No  Toilet Every 2 Hours-In Advance of Need: Yes  Hourly Visual Checks: Awake  Fall Visual Posted: Armband, Socks  Room Door Open: Deferred to promote rest  Alarm On: Bed  Patient Moved Closer to Nursing Station: No    Active Consults:   IP CONSULT TO VASCULAR ACCESS TEAM  IP CONSULT TO 
End of Shift Note    Bedside shift change report given to Zeeshan MIRANDA (oncoming nurse) by Consuelo Ghotra RN (offgoing nurse).  Report included the following information SBAR, MAR, Accordion, and Recent Results    Shift worked:  7P-7A     Shift summary and any significant changes:    Patient verbally combative with sitter and trying to leave room. Messagechristopher JARRELL and Zyprexa ordered. Patient refused scheduled medications. Patient refused shift assessment however allowed me to do reassessment.   Concerns for physician to address:     Zone phone for oncoming shift:        Activity:  Level of Assistance: Minimal assist, patient does 75% or more  Number times ambulated in hallways past shift: 0  Number of times OOB to chair past shift: 0    Cardiac:   Cardiac Monitoring: No      Cardiac Rhythm: Sinus rhythm with PVC    Access:  Current line(s): PICC    Genitourinary:   Urinary Status: Voiding    Respiratory:   O2 Device: None (Room air)  Chronic home O2 use?: NO  Incentive spirometer at bedside: NO    GI:  Last BM (including prior to admit): 02/07/25 (small brown/loose)  Current diet:  ADULT ORAL NUTRITION SUPPLEMENT; Breakfast, Lunch, Dinner; Diabetic Oral Supplement  ADULT ORAL NUTRITION SUPPLEMENT; Lunch, Dinner; Frozen Oral Supplement  ADULT DIET; Regular; Low Fiber  Passing flatus: YES    Pain Management:   Patient states pain is manageable on current regimen: YES    Skin:  John Scale Score: 17  Interventions: Wound Offloading (Prevention Methods): Bed, pressure reduction mattress, Repositioning    Patient Safety:  Fall Risk: Nursing Judgement-Fall Risk High(Add Comments): Yes  Fall Risk Interventions  Nursing Judgement-Fall Risk High(Add Comments): Yes  Toilet Every 2 Hours-In Advance of Need: Yes  Hourly Visual Checks: In bed, Awake, Agitated  Fall Visual Posted: Fall sign posted  Room Door Open: Yes  Alarm On: Bed  Patient Moved Closer to Nursing Station: No    Active Consults:   IP CONSULT TO VASCULAR ACCESS TEAM  IP 
Gastrograf given and Ngtube clamped for 4 hours.  
Gastrografin Challenge in Small Bowel Obstructions    Purpose:   Gastrografin (diatrizoate meglumine-sodium) is a contrast media which is used in small bowel obstructions.     It does two things for the patient:  First, it DIAGNOSTIC, meaning it provides visibility on X-Ray which helps determine the extent or improvement of the obstruction.    Second, it is THERAPUETIC, meaning it helps to promote bowel function as an osmotic laxative, thereby relieving the obstruction.       Process:  Surgeon will order the Gastrografin. This is to be treated like any other medication and ADMINISTERED AS ORDERED regardless of imaging/X-ray orders.   The contrast should be obtained from the [facility] or PHARMACY. Do not call radiology for it. Remember, treat this as a medication, not just contrast.   Mix the medication as noted in the instructions and give to the patient either PO or via NG Tube (if they have one).   If the patient has an NGT, clamp it for 4 hours following administration and then return to suction after four hours.   Xrays will be ordered by the surgeon. These may be 4 hours after the Gastrografin or the next day depending on various patient factors. DO NOT WAIT FOR AN XRAY ORDER TO GIVE THE GASTROGRAFIN. Often times the Xray order will not be placed at the same time as the Gastrografin and this is intentional.   
Multiple conversations with patient and her daughter regarding the NG tube.  Pt doesn't want it but now is agreeable so we will wait for RR nurse to assist.    
Notified NP Jacklyn Zamarripa and CM about change in discharge plans, patient and daughter wanting patient to discharge home with home health.  
Patient discharged to home with daughter. Home Health will follow with care. PICC line previously discontinued. Discharge Instructions reviewed with patient and daughter.   
Patient repeatedly taking off bilat soft mitt restraints (8-9x since 2330 when applied). Upon checking on patient, noted that patient had taken off mitts again, and pulled out NGT. When questioned, patient stated that yes she had taken it out, no we were not putting it back in. MD notified. Awaiting orders.    0330: Patient again taken off mitts after being secured. No pulling on lines. Patient O x 1 only. Mitts reapplied securely within parameters of protocol. Will continue to monitor.    Pari Olguin RN                            
Pharmacy Medication History Review    Medication history obtained by Hanane Matias while patient was in room ER10/10 and was completed based on information available during current patient encounter. Medication history was completed after home meds were reconciled by provider.    Pharmacist Admission Medication Reconciliation Recommendations:   Notify provider of changes         PTA medication list was corrected to the following:   Prior to Admission Medications   Prescriptions Last Dose Informant   Melatonin 10 MG TABS 1/29/2025 Self   Sig: Take by mouth nightly as needed   amLODIPine (NORVASC) 10 MG tablet 1/29/2025 Self   Sig: Take 1 tablet by mouth daily   aspirin 81 MG chewable tablet 1/29/2025 Self   Sig: Take 1 tablet by mouth daily   carvedilol (COREG) 12.5 MG tablet 1/29/2025 Self   Sig: Take 1 tablet by mouth 2 times daily   fluocinonide (LIDEX) 0.05 % external solution Past Week Self   Sig: Apply topically 2 times daily   furosemide (LASIX) 40 MG tablet 1/29/2025 Self   gabapentin (NEURONTIN) 600 MG tablet 1/29/2025 Self   Sig: Take 2 tablets by mouth 2 times daily.   levothyroxine (SYNTHROID) 88 MCG tablet 1/30/2025 Self   Sig: Take 1 tablet by mouth Daily   losartan (COZAAR) 100 MG tablet 1/29/2025 Self   Sig: Take 1 tablet by mouth daily   meloxicam (MOBIC) 15 MG tablet 1/29/2025 Self   Sig: Take 1 tablet by mouth daily   omeprazole (PRILOSEC) 40 MG delayed release capsule 1/29/2025 Self   Sig: Take 1 capsule by mouth daily   potassium chloride (KLOR-CON M) 10 MEQ extended release tablet 1/29/2025 Self   Sig: Take 1 tablet by mouth daily   venlafaxine (EFFEXOR XR) 150 MG extended release capsule 1/29/2025 Self   Sig: Take 1 capsule by mouth daily   venlafaxine (EFFEXOR) 75 MG tablet 1/29/2025 Self   Sig: Take 1 tablet by mouth nightly      Facility-Administered Medications: None         Pertinent Information:      The following medications have been added:   + venlafaxine 75mg  + fluocinonide 
Pharmacy TPN Management Note    TPN indication: SBO    TPN type: Central (PICC line placed 2/3)    Macronutrients:  Protein: 42.5gm  Dextrose: 100gm  Lipids: none today    Rate: 50 ml/hr    Labs:  Recent Labs     Units 02/04/25  0456 02/02/25  0528   NA mmol/L  --  140   K mmol/L  --  3.9   CL mmol/L  --  111*   CO2 mmol/L  --  24   MG mg/dL 2.0  --    PHOS MG/DL 1.7*  --    BUN MG/DL  --  20     No results for input(s): \"AST\", \"ALT\" in the last 72 hours.    Invalid input(s): \"BILIRUBIN\", \"AP\"  Recent Labs     Units 02/02/25  0528   WBC K/uL 7.0   HGB g/dL 11.3*   HCT % 35.3     Electrolytes per LITER:  Sodium 35mEq, Potassium 30mEq, Phosphate 15mM, Calcium 4.5mEq, Magnesium 5mEq    Other additives in TPN: multivitamins  and trace elements    Impression/Plan:   Ordered based on 2/2 labs  Labs are entered for 2/5 AM  TPN macronutrient/rate changes: new start, macros per Nutritional recommendations; reduce LVP to KVO 20mL/hr once TPN starts  TPN electrolyte changes: none  TPN insulin changes: none diabetic, added SSI q6h for am  Trial of clear liquid diet       Pharmacy will continue to monitor enteral nutrition plan, electrolytes, renal function, and dietician recommendations and adjust parenteral nutrition as needed.    Thank you,  KULWINDER MANUEL, [facility]      
Pharmacy TPN Management Note    TPN indication: SBO    TPN type: Central (PICC line placed 2/3)    Macronutrients:  Protein: 62.5 gm  Dextrose: 150 gm  Lipids: none today    Rate: 50 ml/hr    Labs:  Recent Labs     Units 02/05/25  0656 02/04/25  0456   NA mmol/L 138  --    K mmol/L 3.3*  --    CL mmol/L 105  --    CO2 mmol/L 29  --    MG mg/dL 1.9 2.0   PHOS MG/DL 2.7 1.7*   BUN MG/DL 7  --      No results for input(s): \"AST\", \"ALT\" in the last 72 hours.    Invalid input(s): \"BILIRUBIN\", \"AP\"  Recent Labs     Units 02/05/25  0656   WBC K/uL 3.9   HGB g/dL 9.8*   HCT % 29.3*     Electrolytes per LITER:  Sodium 35mEq, Potassium 30mEq, Phosphate 15mM, Calcium 4.5mEq, Magnesium 5mEq    Other additives in TPN: multivitamins  and trace elements    Impression/Plan:   TPN macronutrient/rate changes: advance AA to 62.5 gm, Dextrsose to 150gm  Goal AA 70gm and Dextrose 210 Gm  Added Smof lipids 20% 250ml /day  TPN electrolyte changes: none  TPN insulin changes: non diabetic, SSI q6h         Pharmacy will continue to monitor enteral nutrition plan, electrolytes, renal function, and dietician recommendations and adjust parenteral nutrition as needed.    Thank you,  KULWINDER MANUEL, [facility]        
Pt afebrile but NP aware of HR going up.  Report to Jose R, daughter at bedside.  Pt sleepy but arousable.  Call bell within reach.   
SURGERY PROGRESS NOTE      Admit Date: 2025    POD 5 Days Post-Op    Procedure: Procedure(s):  LAPAROTOMY EXPLORATORY      Subjective:     Patient denies pain and nausea, having Bms.  Feels weak.  Family concerned with slow progress and intermittent confusion.  Family does not want her to received narcotics.     Objective:     BP (!) 154/71   Pulse 71   Temp 99.1 °F (37.3 °C) (Axillary)   Resp 16   Ht 1.549 m (5' 0.98\")   Wt 91.2 kg (201 lb)   SpO2 100%   BMI 38.00 kg/m²      Temp (24hrs), Av.5 °F (36.9 °C), Min:98.1 °F (36.7 °C), Max:99.1 °F (37.3 °C)      No intake/output data recorded.   1901 -  0700  In: 1148.7 [P.O.:340; I.V.:205.9]  Out: 1000 [Urine:900]    Physical Exam:    General:  alert, appears stated age, cooperative, no distress, and oriented to person.  Confused about location.    Abdomen: soft, bowel sounds active, non-tender   Incision:   Negative pressure dressing intact         CBC:   Lab Results   Component Value Date/Time    WBC 3.9 2025 06:56 AM    RBC 3.36 2025 06:56 AM    HGB 9.8 2025 06:56 AM    HCT 29.3 2025 06:56 AM    MCV 87.2 2025 06:56 AM    MCH 29.2 2025 06:56 AM    MCHC 33.4 2025 06:56 AM    RDW 13.9 2025 06:56 AM     2025 06:56 AM    MPV 9.9 2025 06:56 AM     BMP:    Lab Results   Component Value Date/Time     2025 06:56 AM    K 3.3 2025 06:56 AM     2025 06:56 AM    CO2 29 2025 06:56 AM    BUN 7 2025 06:56 AM    CREATININE 0.46 2025 06:56 AM    CALCIUM 8.0 2025 06:56 AM    LABGLOM >90 2025 06:56 AM    LABGLOM >60 2023 09:08 AM    GLUCOSE 109 2025 06:56 AM       Assessment:     75 year old female POD#5 SB resection.  Has return of bowel function but, is weak and encephalopathic     Plan:        Hold narcotics   Hospitalist consult for encephalopathy   Scheduled tylenol   
SURGERY PROGRESS NOTE      Admit Date: 2025    POD 6 Days Post-Op    Procedure: Procedure(s):  LAPAROTOMY EXPLORATORY      Subjective:     Patient doing better today. No complaints of pain. Has been getting up with assistance. Continence improving. Feels thirsty.      Objective:     /65   Pulse 61   Temp 97.5 °F (36.4 °C) (Oral)   Resp 20   Ht 1.549 m (5' 0.98\")   Wt 91.2 kg (201 lb)   SpO2 95%   BMI 38.00 kg/m²      Temp (24hrs), Av °F (36.7 °C), Min:97.3 °F (36.3 °C), Max:99.1 °F (37.3 °C)      No intake/output data recorded.   1901 -  0700  In: -   Out: 400 [Urine:300]    Physical Exam:    General:  alert, appears stated age, cooperative, no distress, and oriented to person.  Confused about location.    Abdomen: soft, bowel sounds active, non-tender   Incision:   Negative pressure dressing intact         CBC:   Lab Results   Component Value Date/Time    WBC 4.7 2025 04:13 AM    RBC 3.29 2025 04:13 AM    HGB 9.7 2025 04:13 AM    HCT 29.1 2025 04:13 AM    MCV 88.4 2025 04:13 AM    MCH 29.5 2025 04:13 AM    MCHC 33.3 2025 04:13 AM    RDW 13.6 2025 04:13 AM     2025 04:13 AM    MPV 10.2 2025 04:13 AM     BMP:    Lab Results   Component Value Date/Time     2025 04:13 AM    K 3.5 2025 04:13 AM     2025 04:13 AM    CO2 27 2025 04:13 AM    BUN 8 2025 04:13 AM    CREATININE 0.50 2025 04:13 AM    CALCIUM 8.1 2025 04:13 AM    LABGLOM >90 2025 04:13 AM    LABGLOM >60 2023 09:08 AM    GLUCOSE 110 2025 04:13 AM       Assessment:     75 year old female POD#5 SB resection.  Has return of bowel function but, is weak and encephalopathic - improving    Plan:       Advance to FULL LIQUID DIET   Appreciate hospitalist input  Scheduled tylenol, no opioids   Mobilize  D/C planning to IPR in 1-2 days     Jacklyn Zamarripa, APRN - NP   
SURGERY PROGRESS NOTE      Admit Date: 2025    POD 7 Days Post-Op    Procedure: Procedure(s):  LAPAROTOMY EXPLORATORY      Subjective:     Patient denies pain. Tolerating diet. Having bowel function.     Objective:     BP (!) 146/81   Pulse 77   Temp 97.5 °F (36.4 °C) (Oral)   Resp 18   Ht 1.549 m (5' 0.98\")   Wt 91.2 kg (201 lb)   SpO2 97%   BMI 38.00 kg/m²      Temp (24hrs), Av.6 °F (36.4 °C), Min:97.2 °F (36.2 °C), Max:98.2 °F (36.8 °C)      No intake/output data recorded.   1901 -  0700  In: 625 [P.O.:625]  Out: 550 [Urine:550]    Physical Exam:    General:  alert, appears stated age, cooperative, no distress, and oriented to person.  Confused about location and event    Abdomen: soft, bowel sounds active, non-tender   Incision:   Negative pressure dressing intact         CBC:   Lab Results   Component Value Date/Time    WBC 4.7 2025 04:13 AM    RBC 3.29 2025 04:13 AM    HGB 9.7 2025 04:13 AM    HCT 29.1 2025 04:13 AM    MCV 88.4 2025 04:13 AM    MCH 29.5 2025 04:13 AM    MCHC 33.3 2025 04:13 AM    RDW 13.6 2025 04:13 AM     2025 04:13 AM    MPV 10.2 2025 04:13 AM     BMP:    Lab Results   Component Value Date/Time     2025 04:13 AM    K 3.5 2025 04:13 AM     2025 04:13 AM    CO2 27 2025 04:13 AM    BUN 8 2025 04:13 AM    CREATININE 0.50 2025 04:13 AM    CALCIUM 8.1 2025 04:13 AM    LABGLOM >90 2025 04:13 AM    LABGLOM >60 2023 09:08 AM    GLUCOSE 110 2025 04:13 AM       Assessment:     75 year old female POD#5 SB resection.  Has return of bowel function but, is weak and encephalopathic -    Plan:       Low fiber diet   Appreciate hospitalist input  Scheduled tylenol, no opioids   Mobilize  Ok for d/c to IPR when cleared medically and placement available     Jacklyn Zamarripa APRN - NP   
SURGERY PROGRESS NOTE      Admit Date: 2025    POD 8 Days Post-Op    Procedure: Procedure(s):  LAPAROTOMY EXPLORATORY      Subjective:     Patient has no complaints. Tolerating PO.  Having bowel movements      Objective:     BP (!) 142/75   Pulse 68   Temp 97.9 °F (36.6 °C) (Oral)   Resp 18   Ht 1.549 m (5' 0.98\")   Wt 91.2 kg (201 lb)   SpO2 95%   BMI 38.00 kg/m²      Temp (24hrs), Av.7 °F (36.5 °C), Min:97.2 °F (36.2 °C), Max:98.1 °F (36.7 °C)      701 - 1900  In: 650 [P.O.:650]  Out: -   1901 -  07  In: 320 [P.O.:320]  Out: 600 [Urine:600]    Physical Exam:    General:  alert, appears stated age, cooperative, and no distress, oriented to person    Abdomen: soft, bowel sounds active, non-tender   Incision:   healing well, no drainage, no erythema, no hernia, no seroma, no swelling, no dehiscence, incision well approximated         CBC:   Lab Results   Component Value Date/Time    WBC 4.7 2025 04:13 AM    RBC 3.29 2025 04:13 AM    HGB 9.7 2025 04:13 AM    HCT 29.1 2025 04:13 AM    MCV 88.4 2025 04:13 AM    MCH 29.5 2025 04:13 AM    MCHC 33.3 2025 04:13 AM    RDW 13.6 2025 04:13 AM     2025 04:13 AM    MPV 10.2 2025 04:13 AM     BMP:    Lab Results   Component Value Date/Time     2025 04:13 AM    K 3.5 2025 04:13 AM     2025 04:13 AM    CO2 27 2025 04:13 AM    BUN 8 2025 04:13 AM    CREATININE 0.50 2025 04:13 AM    CALCIUM 8.1 2025 04:13 AM    LABGLOM >90 2025 04:13 AM    LABGLOM >60 2023 09:08 AM    GLUCOSE 110 2025 04:13 AM       Assessment:     Principal Problem:    SBO (small bowel obstruction) (HCC)  Resolved Problems:    * No resolved hospital problems. *    Has recovered from SBO and bowel resection.  Barrier to discharge is encephalopathy that is improving     Plan:     Continue present treatment  
SURGERY PROGRESS NOTE      Admit Date: 2025    POD 9 Days Post-Op    Procedure: Procedure(s):  LAPAROTOMY EXPLORATORY      Subjective:     Patient has no complaints. Dressed in her own clothes.  Sitting in a chair.  Tolerating regular diet.  Bowels moving.  Oriented to person and place. Did require a sitter for impulsiveness overnight.       Objective:     BP (!) 170/68   Pulse 67   Temp 98.2 °F (36.8 °C) (Oral)   Resp 22   Ht 1.549 m (5' 0.98\")   Wt 91.2 kg (201 lb)   SpO2 96%   BMI 38.00 kg/m²      Temp (24hrs), Av.9 °F (36.6 °C), Min:97.5 °F (36.4 °C), Max:98.2 °F (36.8 °C)      No intake/output data recorded.   1901 -  0700  In: 650 [P.O.:650]  Out: 500 [Urine:500]    Physical Exam:    General:  alert, appears stated age, cooperative, and no distress   Abdomen: soft, bowel sounds active, non-tender   Incision:   healing well, no drainage, no erythema, no hernia, no seroma, no swelling, no dehiscence, incision well approximated         CBC:   Lab Results   Component Value Date/Time    WBC 7.5 2025 04:06 AM    RBC 3.41 2025 04:06 AM    HGB 10.0 2025 04:06 AM    HCT 29.3 2025 04:06 AM    MCV 85.9 2025 04:06 AM    MCH 29.3 2025 04:06 AM    MCHC 34.1 2025 04:06 AM    RDW 14.3 2025 04:06 AM     2025 04:06 AM    MPV 9.8 2025 04:06 AM     BMP:    Lab Results   Component Value Date/Time     2025 04:06 AM    K 3.7 2025 04:06 AM     2025 04:06 AM    CO2 24 2025 04:06 AM    BUN 9 2025 04:06 AM    CREATININE 0.58 2025 04:06 AM    CALCIUM 8.3 2025 04:06 AM    LABGLOM >90 2025 04:06 AM    LABGLOM >60 2023 09:08 AM    GLUCOSE 104 2025 04:06 AM       Assessment:     Ready for discharge from a surgery perspective.  Hopefully she can be sitter free today and move towards discharge to rehab tomorrow    Plan:     Continue present treatment  
Sitter removed from bedside at 1025 on 2/9. Patient aware and oriented. Bed alarm and chair alarm in place. Gripper socks on patient. Patient educated and aware that she needs to utilize call bell if she wants to reposition, get up to walk with assistance, has to use the bathroom, or needs anything - call bell within reach.   
Spiritual Health History and Assessment/Progress Note  [facility]    Initial Encounter,  ,  ,      Name: Malena Avila MRN: [id number]    Age: 75 y.o.     Sex: female   Language: English   Yarsani: Hinduism   SBO (small bowel obstruction) ([facility])     Date: 2/8/2025            Total Time Calculated: 52 min              Spiritual Assessment began in [facility] 3 SURG TELE        Referral/Consult From: Rounding   Encounter Overview/Reason: Initial Encounter  Service Provided For: Patient and family together    Georgie, Belief, Meaning:   Patient identifies as spiritual, is connected with a georgie tradition or spiritual practice, and has beliefs or practices that help with coping during difficult times  Family/Friends have beliefs or practices that help with coping during difficult times      Importance and Influence:  Patient has spiritual/personal beliefs that influence decisions regarding their health  Family/Friends have spiritual/personal beliefs that influence decisions regarding the patient's health    Community:  Patient feels well-supported. Support system includes: Children, [place] Community, and Extended family  Family/Friends feel well-supported. Support system includes: Parent/s and Extended family    Assessment and Plan of Care:     Patient Interventions include: Facilitated expression of thoughts and feelings, Explored spiritual coping/struggle/distress, and Affirmed coping skills/support systems  Family/Friends Interventions include: Affirmed coping skills/support systems    Patient Plan of Care: Spiritual Care available upon further referral  Family/Friends Plan of Care: Spiritual Care available upon further referral    Electronically signed by DERICK Portillo on 2/8/2025 at 3:09 PM     Rev. ADI Portillo BCC  [facility]   Paging Service [phone]  
Surgery      Pt reassessed after contrast administered via NGT, had sudden episode of tachycardia and hypotension.  NGT reconnected to suction, saline bolus instituted and pt send down for stat CT chest abdomen and pelvis with iv contrast to eval for aspiration or intraabdominal process.  Received call from radiology indicating evolution of intraabdominal process to closed loop obstruction with ischemic appearance.  Discussed findings and implications with pt and her daughter.  I have discussed  risks and benefits of surgery including but not limited to bleeding, infection, injury to adjacent structures, likely need for bowel resection, risks of wound healing, anastomotic leak, complications of general anesthesia including ventilator dependence, cardiac arrest or even death.  Questions have been answered and informed consent obtained for laparotomy and likely bowel resection.        Saqib Dooley MD, FACS  [facility]      
Hypokalemia,Delirium overnight after ?tramadol dose per RN  \"sundowning\".  Discussed with RN events overnight.     Pt was agitated and having sundowning overnight again needing Haldol then Zyprexa x 1   Resumed  home Neurontin and SSRI today- should help prevent sundowning as pt awaits IPR placement    Objective:     VITALS:   Last 24hrs VS reviewed since prior progress note. Most recent are:  Patient Vitals for the past 24 hrs:   BP Temp Temp src Pulse Resp SpO2   02/08/25 0240 (!) 175/86 97.2 °F (36.2 °C) Oral 79 15 --   02/07/25 2027 (!) 149/82 98.1 °F (36.7 °C) Oral 74 18 96 %         Intake/Output Summary (Last 24 hours) at 2/8/2025 0827  Last data filed at 2/7/2025 1030  Gross per 24 hour   Intake 320 ml   Output 600 ml   Net -280 ml        I had a face to face encounter and independently examined this patient on 2/8/2025, as outlined below:  PHYSICAL EXAM:  General: Alert, cooperative  EENT:  EOMI. Anicteric sclerae.  Resp:  CTA bilaterally, no wheezing or rales.  No accessory muscle use  CV:  Regular  rhythm,  No edema  GI:  Soft, Non distended, Non tender.  +Bowel sounds  Neurologic:  Alert and oriented X 3, normal speech,   Psych:   Good insight. Not anxious nor agitated  Skin:  No rashes.  No jaundice  Extremities: RUE PICC line noted+     Reviewed most current lab test results and cultures  YES  Reviewed most current radiology test results   YES  Review and summation of old records today    NO  Reviewed patient's current orders and MAR    YES  PMH/SH reviewed - no change compared to H&P    Procedures: see electronic medical records for all procedures/Xrays and details which were not copied into this note but were reviewed prior to creation of Plan.      LABS:  I reviewed today's most current labs and imaging studies.  Pertinent labs include:  Recent Labs     02/06/25 0019 02/06/25 0413   WBC 4.8 4.7   HGB 10.4* 9.7*   HCT 31.0* 29.1*    152     Recent Labs     02/06/25  0019 02/06/25  0413   NA 
Lovenox          Subjective:     Chief Complaint / Reason for Physician Visit:  F/U for Post op AMS, PVCs, Hypothyroidism, SBO/Volvulus s/p resection, Hypokalemia,Delirium overnight after ?tramadol dose per RN  \"sundowning\".  Discussed with RN events overnight.     Pt was agitated and having sundowning overnight again needing Haldol then Zyprexa x 1   Resumed  home Neurontin and SSRI today- should help prevent sundowning as pt awaits IPR placement    Objective:     VITALS:   Last 24hrs VS reviewed since prior progress note. Most recent are:  Patient Vitals for the past 24 hrs:   BP Temp Temp src Pulse Resp SpO2   02/09/25 0821 (!) 148/77 97.3 °F (36.3 °C) Oral 66 17 99 %   02/09/25 0313 (!) 170/68 98.2 °F (36.8 °C) Oral 67 22 96 %   02/08/25 2022 139/76 98.1 °F (36.7 °C) Oral 65 20 94 %   02/08/25 1541 (!) 142/75 97.9 °F (36.6 °C) Oral 68 18 95 %         Intake/Output Summary (Last 24 hours) at 2/9/2025 1255  Last data filed at 2/8/2025 2209  Gross per 24 hour   Intake 650 ml   Output 500 ml   Net 150 ml        I had a face to face encounter and independently examined this patient on 2/9/2025, as outlined below:  PHYSICAL EXAM:  General: Alert, cooperative  EENT:  EOMI. Anicteric sclerae.  Resp:  CTA bilaterally, no wheezing or rales.  No accessory muscle use  CV:  Regular  rhythm,  No edema  GI:  Soft, Non distended, Non tender.  +Bowel sounds  Neurologic:  Alert and oriented X 3, normal speech,   Psych:   Good insight. Not anxious nor agitated  Skin:  No rashes.  No jaundice  Extremities: RUE PICC line noted+     Reviewed most current lab test results and cultures  YES  Reviewed most current radiology test results   YES  Review and summation of old records today    NO  Reviewed patient's current orders and MAR    YES  PMH/SH reviewed - no change compared to H&P    Procedures: see electronic medical records for all procedures/Xrays and details which were not copied into this note but were reviewed prior to creation

## 2025-02-10 NOTE — CARE COORDINATION
Chart reviewed. Discharge orders seen in the chart.    CM spoke with La[facility]. Cannot accept patient today.    CM spoke with patient and daughter Kayli Avila. Explained that [facility] unable to take patient today. They thought it over and spoke with CM again. Wishes to go home and daughter will care for her. Requesting CM set up home care. Home Care arranged through [employer], to start tomorrow. Will provide PT and OT.    Daughter will transport patient home. No other needs. Okay to dc from CM standpoint.       02/10/25 1212   Services At/After Discharge   Transition of Care Consult (CM Consult) Discharge Planning   Services At/After Discharge Home Health    Resource Information Provided? No   Mode of Transport at Discharge Other (see comment)  (daughter in the room and will transport)   Confirm Follow Up Transport Family   Condition of Participation: Discharge Planning   The Plan for Transition of Care is related to the following treatment goals: Patient will return home to continue recuperation. Daughter will stay with her. Home health PT/OT has been arranged. Will follow up with physician appointments.   The Patient and/or Patient Representative was provided with a Choice of Provider? Patient Representative   Name of the Patient Representative who was provided with the Choice of Provider and agrees with the Discharge Plan?  Kayli Avila   The Patient and/Or Patient Representative agree with the Discharge Plan? Yes   Freedom of Choice list was provided with basic dialogue that supports the patient's individualized plan of care/goals, treatment preferences, and shares the quality data associated with the providers?  Yes  (available, but spoke to daughter and no preference)     Elsa Wylie RN  Care Manager  [phone]

## 2025-02-10 NOTE — DISCHARGE SUMMARY
Activity as tolerated    - Ambulate several times a day   - No heavy lifting for 4 weeks   - Do not drive for two weeks or while on opioid pain medications  - Wound Care: Keep wound(s) clean and dry. See discharge instruction sheet.    Allergies:    Allergies   Allergen Reactions    Erythromycin Nausea And Vomiting and Other (See Comments)     Other Reaction(s): facial swelling    Codeine Other (See Comments)     Makes her foggy in the head    Sulfa Antibiotics Rash              -DISCHARGE MEDICATION LIST        Medication List        CHANGE how you take these medications      furosemide 40 MG tablet  Commonly known as: LASIX  What changed: Another medication with the same name was removed. Continue taking this medication, and follow the directions you see here.     omeprazole 40 MG delayed release capsule  Commonly known as: PRILOSEC  What changed: Another medication with the same name was removed. Continue taking this medication, and follow the directions you see here.            CONTINUE taking these medications      amLODIPine 10 MG tablet  Commonly known as: NORVASC     aspirin 81 MG chewable tablet     carvedilol 12.5 MG tablet  Commonly known as: COREG     fluocinonide 0.05 % external solution  Commonly known as: LIDEX     gabapentin 600 MG tablet  Commonly known as: NEURONTIN     levothyroxine 88 MCG tablet  Commonly known as: SYNTHROID     losartan 100 MG tablet  Commonly known as: COZAAR     Melatonin 10 MG Tabs     meloxicam 15 MG tablet  Commonly known as: MOBIC     potassium chloride 10 MEQ extended release tablet  Commonly known as: KLOR-CON M     * venlafaxine 150 MG extended release capsule  Commonly known as: EFFEXOR XR     * venlafaxine 75 MG tablet  Commonly known as: EFFEXOR           * This list has 2 medication(s) that are the same as other medications prescribed for you. Read the directions carefully, and ask your doctor or other care provider to review them with you.                STOP

## 2025-02-11 ENCOUNTER — TELEPHONE (OUTPATIENT)
Age: 75
End: 2025-02-11

## 2025-02-11 NOTE — TELEPHONE ENCOUNTER
Tracy calling with [facility] to see if Dr. Dooley would follow pt for home health.     Tracy- [phone]

## 2025-02-11 NOTE — TELEPHONE ENCOUNTER
Keota with home health called and said pt will need 7 weeks of PT. He just wanted to let us know.

## 2025-02-12 ENCOUNTER — TELEPHONE (OUTPATIENT)
Age: 75
End: 2025-02-12

## 2025-02-12 NOTE — TELEPHONE ENCOUNTER
Patient had sbo was seen in er is letting md know is holding off on hernia surgery for now    [phone]

## 2025-02-18 ENCOUNTER — TELEPHONE (OUTPATIENT)
Age: 75
End: 2025-02-18

## 2025-02-18 NOTE — TELEPHONE ENCOUNTER
Pts daughter called in, pt wants to know how long pt needs to stay on the low fiber diet. When can pt start driving again

## 2025-02-18 NOTE — TELEPHONE ENCOUNTER
Informed patient and daughter, remain on low fiber diet until post op appointment 03/05/2025, and no driving for 2 weeks or while on pain medication. Express understanding.

## 2025-03-05 ENCOUNTER — OFFICE VISIT (OUTPATIENT)
Age: 75
End: 2025-03-05

## 2025-03-05 VITALS
TEMPERATURE: 97.4 F | HEART RATE: 81 BPM | SYSTOLIC BLOOD PRESSURE: 108 MMHG | RESPIRATION RATE: 17 BRPM | BODY MASS INDEX: 33.61 KG/M2 | WEIGHT: 178 LBS | HEIGHT: 61 IN | DIASTOLIC BLOOD PRESSURE: 73 MMHG | OXYGEN SATURATION: 98 %

## 2025-03-05 DIAGNOSIS — Z48.89 POSTOPERATIVE VISIT: Primary | ICD-10-CM

## 2025-03-05 PROCEDURE — 99024 POSTOP FOLLOW-UP VISIT: CPT | Performed by: SURGERY

## 2025-03-05 ASSESSMENT — PATIENT HEALTH QUESTIONNAIRE - PHQ9
SUM OF ALL RESPONSES TO PHQ QUESTIONS 1-9: 0
SUM OF ALL RESPONSES TO PHQ QUESTIONS 1-9: 0
2. FEELING DOWN, DEPRESSED OR HOPELESS: NOT AT ALL
1. LITTLE INTEREST OR PLEASURE IN DOING THINGS: NOT AT ALL
SUM OF ALL RESPONSES TO PHQ QUESTIONS 1-9: 0
SUM OF ALL RESPONSES TO PHQ QUESTIONS 1-9: 0

## 2025-03-05 NOTE — PROGRESS NOTES
Post-Op Office Visit    SUBJECTIVE: Malena Avila is a 75 y.o. female is seen for a routine postop check.  Reports no problems with the wound or other issues.  Activity, diet and bowels are normal. No pain.    Patient is still processing a lot of trauma over her altered mental status during her hospital stay.     OBJECTIVE: Appears well. Wounds are well healed without complications or infection.    ASSESSMENT: normal postoperative course, doing well.    PLAN: Patient is instructed to avoid heavy lifting for 2 more weeks. Return PRN for any problems or concerns.    Follow up with Dr. Xiao to address her paraesophageal hernia at her leisure.     DAY Espitia - NP

## 2025-03-05 NOTE — PROGRESS NOTES
Identified pt with two pt identifiers (name and ). Reviewed chart in preparation for visit and have obtained necessary documentation.    Malena Avila is a 75 y.o. female  Chief Complaint   Patient presents with    Follow-up     S/P 2025 Lap exploratory small-bowel resection and primary stapled anastomosis     /73 (Site: Left Upper Arm, Position: Sitting, Cuff Size: Large Adult)   Pulse 81   Temp 97.4 °F (36.3 °C) (Oral)   Resp 17   Ht 1.549 m (5' 0.98\")   Wt 80.7 kg (178 lb)   SpO2 98%   BMI 33.65 kg/m²     1. Have you been to the ER, urgent care clinic since your last visit?  Hospitalized since your last visit?no    2. Have you seen or consulted any other health care providers outside of the [facility] since your last visit?  Include any pap smears or colon screening. no

## 2025-03-19 ENCOUNTER — OFFICE VISIT (OUTPATIENT)
Age: 75
End: 2025-03-19
Payer: MEDICARE

## 2025-03-19 VITALS
OXYGEN SATURATION: 98 % | SYSTOLIC BLOOD PRESSURE: 113 MMHG | BODY MASS INDEX: 33.79 KG/M2 | WEIGHT: 179 LBS | HEART RATE: 84 BPM | RESPIRATION RATE: 17 BRPM | HEIGHT: 61 IN | DIASTOLIC BLOOD PRESSURE: 76 MMHG | TEMPERATURE: 97.3 F

## 2025-03-19 DIAGNOSIS — K21.9 PARAESOPHAGEAL HERNIA WITH GASTROESOPHAGEAL REFLUX: Primary | ICD-10-CM

## 2025-03-19 DIAGNOSIS — K44.9 PARAESOPHAGEAL HERNIA WITH GASTROESOPHAGEAL REFLUX: Primary | ICD-10-CM

## 2025-03-19 PROCEDURE — G8417 CALC BMI ABV UP PARAM F/U: HCPCS | Performed by: SURGERY

## 2025-03-19 PROCEDURE — 1036F TOBACCO NON-USER: CPT | Performed by: SURGERY

## 2025-03-19 PROCEDURE — 1126F AMNT PAIN NOTED NONE PRSNT: CPT | Performed by: SURGERY

## 2025-03-19 PROCEDURE — 3017F COLORECTAL CA SCREEN DOC REV: CPT | Performed by: SURGERY

## 2025-03-19 PROCEDURE — 1090F PRES/ABSN URINE INCON ASSESS: CPT | Performed by: SURGERY

## 2025-03-19 PROCEDURE — 1159F MED LIST DOCD IN RCRD: CPT | Performed by: SURGERY

## 2025-03-19 PROCEDURE — G8400 PT W/DXA NO RESULTS DOC: HCPCS | Performed by: SURGERY

## 2025-03-19 PROCEDURE — 99214 OFFICE O/P EST MOD 30 MIN: CPT | Performed by: SURGERY

## 2025-03-19 PROCEDURE — 1160F RVW MEDS BY RX/DR IN RCRD: CPT | Performed by: SURGERY

## 2025-03-19 PROCEDURE — 1123F ACP DISCUSS/DSCN MKR DOCD: CPT | Performed by: SURGERY

## 2025-03-19 PROCEDURE — G8427 DOCREV CUR MEDS BY ELIG CLIN: HCPCS | Performed by: SURGERY

## 2025-03-19 ASSESSMENT — PATIENT HEALTH QUESTIONNAIRE - PHQ9
SUM OF ALL RESPONSES TO PHQ QUESTIONS 1-9: 0
1. LITTLE INTEREST OR PLEASURE IN DOING THINGS: NOT AT ALL
2. FEELING DOWN, DEPRESSED OR HOPELESS: NOT AT ALL
SUM OF ALL RESPONSES TO PHQ QUESTIONS 1-9: 0

## 2025-03-19 NOTE — PROGRESS NOTES
Identified pt with two pt identifiers (name and ). Reviewed chart in preparation for visit and have obtained necessary documentation.    Malena Avila is a 75 y.o. female  Chief Complaint   Patient presents with    Surgical Consult     E/P 01/15/2025      /76 (BP Site: Right Upper Arm, Patient Position: Sitting, BP Cuff Size: Small Adult)   Pulse 84   Temp 97.3 °F (36.3 °C) (Oral)   Resp 17   Ht 1.549 m (5' 0.98\")   Wt 81.2 kg (179 lb)   SpO2 98%   BMI 33.84 kg/m²     1. Have you been to the ER, urgent care clinic since your last visit?  Hospitalized since your last visit?no    2. Have you seen or consulted any other health care providers outside of the [facility] since your last visit?  Include any pap smears or colon screening. no    
Extremities normal, atraumatic, no cyanosis or edema.   Skin: Skin color, texture, turgor normal. No rashes or lesions          Assessment:     74 year old female with paraesophageal and GERD     Plan:     Malena Avila is having significant symptoms despite home modifications and medical therapy.     We discussed surgical treatment for reflux and risk and benefits vs non-surgical treatment, specifically PPIs.    We discussed advantages and disadvantages of for the available procedure for the treatment of reflux including:    Toupet Fundoplication (Lap Toupet)    I had an extensive discussion with Malena Avila  regarding the risks, benefits, and alternatives of proceeding with surgery. We reviewed the risks of surgery including: the risk of anesthesia, bleeding, infection, injury to bowel, dysphasia, injury to the vagus nerve, pneumothorax, pulmonary embolism, gas-bloat syndrome, respiratory failure, death, slipped wrap, recurrence, and the lack of symptomatic improvement were discussed. We discussed long-term risk of recurrence of symptoms and need to resume medications to control the symptoms.    All questions answered.  She expressed understanding of our discussion.     Patient wants to think about timing of surgery. She may want to wait until after her vacation in August. She will call when she is ready to schedule    Milagro Xiao MD

## 2025-04-07 ENCOUNTER — TELEPHONE (OUTPATIENT)
Age: 75
End: 2025-04-07

## 2025-04-08 ENCOUNTER — PREP FOR PROCEDURE (OUTPATIENT)
Age: 75
End: 2025-04-08

## 2025-04-08 DIAGNOSIS — K44.9 PARAESOPHAGEAL HERNIA: ICD-10-CM

## 2025-04-15 ENCOUNTER — TELEPHONE (OUTPATIENT)
Age: 75
End: 2025-04-15

## 2025-04-15 NOTE — TELEPHONE ENCOUNTER
Patient calling has surgery 4/12 and her gynecologist will be scheduling botox for incontinence, would like to know if it would interfere with surgery, please advise so she can get that botox shot scheduled with her gyno    CB  [phone]

## 2025-04-22 ENCOUNTER — OFFICE VISIT (OUTPATIENT)
Age: 75
End: 2025-04-22
Payer: MEDICARE

## 2025-04-22 VITALS
WEIGHT: 176.4 LBS | SYSTOLIC BLOOD PRESSURE: 125 MMHG | HEART RATE: 83 BPM | RESPIRATION RATE: 18 BRPM | TEMPERATURE: 97.5 F | HEIGHT: 61 IN | DIASTOLIC BLOOD PRESSURE: 85 MMHG | OXYGEN SATURATION: 93 % | BODY MASS INDEX: 33.3 KG/M2

## 2025-04-22 DIAGNOSIS — K21.9 PARAESOPHAGEAL HERNIA WITH GASTROESOPHAGEAL REFLUX: Primary | ICD-10-CM

## 2025-04-22 DIAGNOSIS — K44.9 PARAESOPHAGEAL HERNIA WITH GASTROESOPHAGEAL REFLUX: Primary | ICD-10-CM

## 2025-04-22 PROCEDURE — G8427 DOCREV CUR MEDS BY ELIG CLIN: HCPCS | Performed by: SURGERY

## 2025-04-22 PROCEDURE — 1090F PRES/ABSN URINE INCON ASSESS: CPT | Performed by: SURGERY

## 2025-04-22 PROCEDURE — 1036F TOBACCO NON-USER: CPT | Performed by: SURGERY

## 2025-04-22 PROCEDURE — 1160F RVW MEDS BY RX/DR IN RCRD: CPT | Performed by: SURGERY

## 2025-04-22 PROCEDURE — G8417 CALC BMI ABV UP PARAM F/U: HCPCS | Performed by: SURGERY

## 2025-04-22 PROCEDURE — G8400 PT W/DXA NO RESULTS DOC: HCPCS | Performed by: SURGERY

## 2025-04-22 PROCEDURE — 3017F COLORECTAL CA SCREEN DOC REV: CPT | Performed by: SURGERY

## 2025-04-22 PROCEDURE — 1159F MED LIST DOCD IN RCRD: CPT | Performed by: SURGERY

## 2025-04-22 PROCEDURE — 1126F AMNT PAIN NOTED NONE PRSNT: CPT | Performed by: SURGERY

## 2025-04-22 PROCEDURE — 99214 OFFICE O/P EST MOD 30 MIN: CPT | Performed by: SURGERY

## 2025-04-22 PROCEDURE — 1123F ACP DISCUSS/DSCN MKR DOCD: CPT | Performed by: SURGERY

## 2025-04-22 ASSESSMENT — PATIENT HEALTH QUESTIONNAIRE - PHQ9
SUM OF ALL RESPONSES TO PHQ QUESTIONS 1-9: 0
1. LITTLE INTEREST OR PLEASURE IN DOING THINGS: NOT AT ALL
SUM OF ALL RESPONSES TO PHQ QUESTIONS 1-9: 0
SUM OF ALL RESPONSES TO PHQ QUESTIONS 1-9: 0
2. FEELING DOWN, DEPRESSED OR HOPELESS: NOT AT ALL
SUM OF ALL RESPONSES TO PHQ QUESTIONS 1-9: 0

## 2025-04-22 NOTE — PROGRESS NOTES
Identified pt with two pt identifiers (name and ). Reviewed chart in preparation for visit and have obtained necessary documentation.    Malena Avila is a 75 y.o. female Follow-up (Follow up Discuss Hernia surgery)  .    Vitals:    25 1040   BP: 125/85   BP Site: Right Upper Arm   Patient Position: Sitting   BP Cuff Size: Large Adult   Pulse: 83   Resp: 18   Temp: 97.5 °F (36.4 °C)   TempSrc: Oral   SpO2: 93%   Weight: 80 kg (176 lb 6.4 oz)   Height: 1.549 m (5' 0.98\")          1. Have you been to the ER, urgent care clinic since your last visit?  Hospitalized since your last visit?  yes - [facility]     2. Have you seen or consulted any other health care providers outside of the [facility] since your last visit?  Include any pap smears or colon screening.  yes - [facility]  
Last Year: 0     Homeless in the Last Year: No     Current Outpatient Medications   Medication Sig Dispense Refill    fluocinonide (LIDEX) 0.05 % external solution Apply topically 2 times daily      venlafaxine (EFFEXOR) 75 MG tablet Take 1 tablet by mouth nightly      potassium chloride (KLOR-CON M) 10 MEQ extended release tablet Take 1 tablet by mouth daily      furosemide (LASIX) 40 MG tablet       omeprazole (PRILOSEC) 40 MG delayed release capsule Take 1 capsule by mouth daily      levothyroxine (SYNTHROID) 88 MCG tablet Take 1 tablet by mouth Daily      amLODIPine (NORVASC) 10 MG tablet Take 1 tablet by mouth daily      aspirin 81 MG chewable tablet Take 1 tablet by mouth daily      carvedilol (COREG) 12.5 MG tablet Take 1 tablet by mouth 2 times daily      gabapentin (NEURONTIN) 600 MG tablet Take 2 tablets by mouth 2 times daily.      losartan (COZAAR) 100 MG tablet Take 1 tablet by mouth daily      Melatonin 10 MG TABS Take by mouth nightly as needed      meloxicam (MOBIC) 15 MG tablet Take 1 tablet by mouth daily      venlafaxine (EFFEXOR XR) 150 MG extended release capsule Take 1 capsule by mouth daily       No current facility-administered medications for this visit.     Allergies   Allergen Reactions    Erythromycin Nausea And Vomiting and Other (See Comments)     Other Reaction(s): facial swelling    Codeine Other (See Comments)     Makes her foggy in the head    Sulfa Antibiotics Rash       Review of Systems  Pertinent items are noted in HPI.      Objective:      /85 (BP Site: Right Upper Arm, Patient Position: Sitting, BP Cuff Size: Large Adult)   Pulse 83   Temp 97.5 °F (36.4 °C) (Oral)   Resp 18   Ht 1.549 m (5' 0.98\")   Wt 80 kg (176 lb 6.4 oz)   SpO2 93%   BMI 33.35 kg/m²     Physical Exam:  General:  Alert, cooperative, no distress, appears stated age.   Eyes:  Conjunctivae/corneas clear.    Ears:  Normal external ear canals both ears.   Nose: Nares normal. Septum midline.

## 2025-05-06 ENCOUNTER — TELEPHONE (OUTPATIENT)
Age: 75
End: 2025-05-06

## 2025-05-07 ENCOUNTER — TELEPHONE (OUTPATIENT)
Age: 75
End: 2025-05-07

## 2025-05-27 NOTE — PERIOP NOTE
[facility]  Preoperative Instructions        Surgery Date 6/2/25  Time of Arrival:  To Be Called  Contact # [phone]    On the day of your surgery, please report to Surgical Services Registration Desk and sign in at your designated time.  The Surgery Center is located to the right of the Emergency Room.     2. You must have someone with you to drive you home. You should not drive a car for 24 hours following surgery. Please make arrangements for a friend or family member to stay with you for the first 24 hours after your surgery.    3. Do not have anything to eat or drink (including water, gum, mints, coffee, juice) after midnight ?? 6/1/25     .?This may not apply to medications prescribed by your physician. ?(Please note below the special instructions with medications to take the morning of your procedure.)    4. We recommend you do not drink any alcoholic beverages for 24 hours before and after your surgery.    5. Contact your surgeon's office for instructions on the following medications: non-steroidal anti-inflammatory drugs (i.e. Advil, Aleve), vitamins, and supplements. (Some surgeon's will want you to stop these medications prior to surgery and others may allow you to take them)  **If you are currently taking Plavix, Coumadin, Aspirin and/or other blood-thinning agents, contact your surgeon for instructions.** Your surgeon will partner with the physician prescribing these medications to determine if it is safe to stop or if you need to continue taking.  Please do not stop taking these medications without instructions from your surgeon    6. Wear comfortable clothes.  Wear glasses instead of contacts.  Do not bring any money or jewelry. Please bring picture ID, insurance card, and any prearranged co-payment or hospital payment.  Do not wear make-up, particularly mascara the morning of your surgery.  Do not wear nail polish, particularly if you are having foot /hand surgery.  Wear

## 2025-05-29 ENCOUNTER — ANESTHESIA EVENT (OUTPATIENT)
Facility: HOSPITAL | Age: 75
End: 2025-05-29
Payer: MEDICARE

## 2025-06-02 ENCOUNTER — HOSPITAL ENCOUNTER (OUTPATIENT)
Facility: HOSPITAL | Age: 75
Discharge: HOME | End: 2025-06-03
Attending: SURGERY | Admitting: SURGERY
Payer: MEDICARE

## 2025-06-02 ENCOUNTER — ANESTHESIA (OUTPATIENT)
Facility: HOSPITAL | Age: 75
End: 2025-06-02
Payer: MEDICARE

## 2025-06-02 DIAGNOSIS — K44.9 PARAESOPHAGEAL HERNIA WITH GASTROESOPHAGEAL REFLUX: Primary | ICD-10-CM

## 2025-06-02 DIAGNOSIS — K21.9 PARAESOPHAGEAL HERNIA WITH GASTROESOPHAGEAL REFLUX: Primary | ICD-10-CM

## 2025-06-02 PROCEDURE — 2500000003 HC RX 250 WO HCPCS

## 2025-06-02 PROCEDURE — S2900 ROBOTIC SURGICAL SYSTEM: HCPCS | Performed by: SURGERY

## 2025-06-02 PROCEDURE — C1781 MESH (IMPLANTABLE): HCPCS | Performed by: SURGERY

## 2025-06-02 PROCEDURE — 6360000002 HC RX W HCPCS: Performed by: SURGERY

## 2025-06-02 PROCEDURE — 2500000003 HC RX 250 WO HCPCS: Performed by: SURGERY

## 2025-06-02 PROCEDURE — 6360000002 HC RX W HCPCS

## 2025-06-02 PROCEDURE — 6370000000 HC RX 637 (ALT 250 FOR IP): Performed by: ANESTHESIOLOGY

## 2025-06-02 PROCEDURE — 3700000001 HC ADD 15 MINUTES (ANESTHESIA): Performed by: SURGERY

## 2025-06-02 PROCEDURE — 2709999900 HC NON-CHARGEABLE SUPPLY: Performed by: SURGERY

## 2025-06-02 PROCEDURE — 7100000000 HC PACU RECOVERY - FIRST 15 MIN: Performed by: SURGERY

## 2025-06-02 PROCEDURE — 2720000010 HC SURG SUPPLY STERILE: Performed by: SURGERY

## 2025-06-02 PROCEDURE — 88302 TISSUE EXAM BY PATHOLOGIST: CPT

## 2025-06-02 PROCEDURE — 43282 LAP PARAESOPH HER RPR W/MESH: CPT | Performed by: SURGERY

## 2025-06-02 PROCEDURE — 3700000000 HC ANESTHESIA ATTENDED CARE: Performed by: SURGERY

## 2025-06-02 PROCEDURE — 7100000001 HC PACU RECOVERY - ADDTL 15 MIN: Performed by: SURGERY

## 2025-06-02 PROCEDURE — 6360000002 HC RX W HCPCS: Performed by: ANESTHESIOLOGY

## 2025-06-02 PROCEDURE — 3600000009 HC SURGERY ROBOT BASE: Performed by: SURGERY

## 2025-06-02 PROCEDURE — 2580000003 HC RX 258

## 2025-06-02 PROCEDURE — 3600000019 HC SURGERY ROBOT ADDTL 15MIN: Performed by: SURGERY

## 2025-06-02 PROCEDURE — 6370000000 HC RX 637 (ALT 250 FOR IP): Performed by: SURGERY

## 2025-06-02 PROCEDURE — 2580000003 HC RX 258: Performed by: ANESTHESIOLOGY

## 2025-06-02 DEVICE — BIO-A TISSUE REINFORCEMENT 7CMX10CM
Type: IMPLANTABLE DEVICE | Site: ABDOMEN | Status: FUNCTIONAL
Brand: GORE BIO-A TISSUE REINFORCEMENT

## 2025-06-02 RX ORDER — PROCHLORPERAZINE EDISYLATE 5 MG/ML
5 INJECTION INTRAMUSCULAR; INTRAVENOUS
Status: COMPLETED | OUTPATIENT
Start: 2025-06-02 | End: 2025-06-02

## 2025-06-02 RX ORDER — BUPIVACAINE HYDROCHLORIDE 5 MG/ML
INJECTION, SOLUTION PERINEURAL PRN
Status: DISCONTINUED | OUTPATIENT
Start: 2025-06-02 | End: 2025-06-02 | Stop reason: ALTCHOICE

## 2025-06-02 RX ORDER — NALOXONE HYDROCHLORIDE 0.4 MG/ML
INJECTION, SOLUTION INTRAMUSCULAR; INTRAVENOUS; SUBCUTANEOUS PRN
Status: DISCONTINUED | OUTPATIENT
Start: 2025-06-02 | End: 2025-06-02 | Stop reason: HOSPADM

## 2025-06-02 RX ORDER — SODIUM CHLORIDE 0.9 % (FLUSH) 0.9 %
5-40 SYRINGE (ML) INJECTION EVERY 12 HOURS SCHEDULED
Status: DISCONTINUED | OUTPATIENT
Start: 2025-06-02 | End: 2025-06-03 | Stop reason: HOSPADM

## 2025-06-02 RX ORDER — ACETAMINOPHEN 160 MG
2000 TABLET,DISINTEGRATING ORAL DAILY
COMMUNITY

## 2025-06-02 RX ORDER — SODIUM CHLORIDE 9 MG/ML
INJECTION, SOLUTION INTRAVENOUS PRN
Status: DISCONTINUED | OUTPATIENT
Start: 2025-06-02 | End: 2025-06-02

## 2025-06-02 RX ORDER — ONDANSETRON 4 MG/1
4 TABLET, ORALLY DISINTEGRATING ORAL EVERY 8 HOURS PRN
Status: DISCONTINUED | OUTPATIENT
Start: 2025-06-02 | End: 2025-06-03 | Stop reason: HOSPADM

## 2025-06-02 RX ORDER — LIDOCAINE HYDROCHLORIDE 20 MG/ML
INJECTION, SOLUTION EPIDURAL; INFILTRATION; INTRACAUDAL; PERINEURAL
Status: DISCONTINUED | OUTPATIENT
Start: 2025-06-02 | End: 2025-06-02 | Stop reason: SDUPTHER

## 2025-06-02 RX ORDER — OXYCODONE HYDROCHLORIDE 5 MG/1
5 TABLET ORAL EVERY 4 HOURS PRN
Refills: 0 | Status: DISCONTINUED | OUTPATIENT
Start: 2025-06-02 | End: 2025-06-03 | Stop reason: HOSPADM

## 2025-06-02 RX ORDER — VENLAFAXINE HYDROCHLORIDE 37.5 MG/1
150 CAPSULE, EXTENDED RELEASE ORAL DAILY
Status: DISCONTINUED | OUTPATIENT
Start: 2025-06-03 | End: 2025-06-03 | Stop reason: HOSPADM

## 2025-06-02 RX ORDER — HYDROMORPHONE HYDROCHLORIDE 1 MG/ML
0.25 INJECTION, SOLUTION INTRAMUSCULAR; INTRAVENOUS; SUBCUTANEOUS EVERY 5 MIN PRN
Status: DISCONTINUED | OUTPATIENT
Start: 2025-06-02 | End: 2025-06-02 | Stop reason: HOSPADM

## 2025-06-02 RX ORDER — GABAPENTIN 300 MG/1
1200 CAPSULE ORAL 2 TIMES DAILY
Status: DISCONTINUED | OUTPATIENT
Start: 2025-06-02 | End: 2025-06-03 | Stop reason: HOSPADM

## 2025-06-02 RX ORDER — POTASSIUM CHLORIDE 1500 MG/1
10 TABLET, EXTENDED RELEASE ORAL NIGHTLY
Status: DISCONTINUED | OUTPATIENT
Start: 2025-06-02 | End: 2025-06-03 | Stop reason: HOSPADM

## 2025-06-02 RX ORDER — LOSARTAN POTASSIUM 100 MG/1
100 TABLET ORAL DAILY
Status: DISCONTINUED | OUTPATIENT
Start: 2025-06-03 | End: 2025-06-03 | Stop reason: HOSPADM

## 2025-06-02 RX ORDER — MULTIVITAMIN WITH IRON
1 TABLET ORAL DAILY
COMMUNITY

## 2025-06-02 RX ORDER — ACETAMINOPHEN 500 MG
500 TABLET ORAL EVERY 6 HOURS PRN
COMMUNITY

## 2025-06-02 RX ORDER — SODIUM CHLORIDE 0.9 % (FLUSH) 0.9 %
5-40 SYRINGE (ML) INJECTION EVERY 12 HOURS SCHEDULED
Status: DISCONTINUED | OUTPATIENT
Start: 2025-06-02 | End: 2025-06-02 | Stop reason: HOSPADM

## 2025-06-02 RX ORDER — ENOXAPARIN SODIUM 100 MG/ML
40 INJECTION SUBCUTANEOUS DAILY
Status: DISCONTINUED | OUTPATIENT
Start: 2025-06-03 | End: 2025-06-03 | Stop reason: HOSPADM

## 2025-06-02 RX ORDER — PROCHLORPERAZINE EDISYLATE 5 MG/ML
5 INJECTION INTRAMUSCULAR; INTRAVENOUS
Status: DISCONTINUED | OUTPATIENT
Start: 2025-06-02 | End: 2025-06-02 | Stop reason: HOSPADM

## 2025-06-02 RX ORDER — SCOPOLAMINE 1 MG/3D
1 PATCH, EXTENDED RELEASE TRANSDERMAL
Status: DISCONTINUED | OUTPATIENT
Start: 2025-06-02 | End: 2025-06-03 | Stop reason: HOSPADM

## 2025-06-02 RX ORDER — SODIUM CHLORIDE 9 MG/ML
INJECTION, SOLUTION INTRAVENOUS PRN
Status: DISCONTINUED | OUTPATIENT
Start: 2025-06-02 | End: 2025-06-02 | Stop reason: HOSPADM

## 2025-06-02 RX ORDER — ROCURONIUM BROMIDE 10 MG/ML
INJECTION, SOLUTION INTRAVENOUS
Status: DISCONTINUED | OUTPATIENT
Start: 2025-06-02 | End: 2025-06-02 | Stop reason: SDUPTHER

## 2025-06-02 RX ORDER — HYDROMORPHONE HYDROCHLORIDE 1 MG/ML
0.5 INJECTION, SOLUTION INTRAMUSCULAR; INTRAVENOUS; SUBCUTANEOUS
Refills: 0 | Status: DISCONTINUED | OUTPATIENT
Start: 2025-06-02 | End: 2025-06-03 | Stop reason: HOSPADM

## 2025-06-02 RX ORDER — SODIUM CHLORIDE 0.9 % (FLUSH) 0.9 %
5-40 SYRINGE (ML) INJECTION PRN
Status: DISCONTINUED | OUTPATIENT
Start: 2025-06-02 | End: 2025-06-02 | Stop reason: HOSPADM

## 2025-06-02 RX ORDER — SODIUM CHLORIDE, SODIUM LACTATE, POTASSIUM CHLORIDE, CALCIUM CHLORIDE 600; 310; 30; 20 MG/100ML; MG/100ML; MG/100ML; MG/100ML
INJECTION, SOLUTION INTRAVENOUS ONCE
Status: COMPLETED | OUTPATIENT
Start: 2025-06-02 | End: 2025-06-03

## 2025-06-02 RX ORDER — SUCCINYLCHOLINE/SOD CL,ISO/PF 200MG/10ML
SYRINGE (ML) INTRAVENOUS
Status: DISCONTINUED | OUTPATIENT
Start: 2025-06-02 | End: 2025-06-02 | Stop reason: SDUPTHER

## 2025-06-02 RX ORDER — ACETAMINOPHEN 325 MG/1
650 TABLET ORAL EVERY 6 HOURS
Status: DISCONTINUED | OUTPATIENT
Start: 2025-06-02 | End: 2025-06-03 | Stop reason: HOSPADM

## 2025-06-02 RX ORDER — SODIUM CHLORIDE, SODIUM LACTATE, POTASSIUM CHLORIDE, CALCIUM CHLORIDE 600; 310; 30; 20 MG/100ML; MG/100ML; MG/100ML; MG/100ML
INJECTION, SOLUTION INTRAVENOUS
Status: DISCONTINUED | OUTPATIENT
Start: 2025-06-02 | End: 2025-06-02 | Stop reason: SDUPTHER

## 2025-06-02 RX ORDER — FUROSEMIDE 40 MG/1
40 TABLET ORAL DAILY
Status: DISCONTINUED | OUTPATIENT
Start: 2025-06-03 | End: 2025-06-03 | Stop reason: HOSPADM

## 2025-06-02 RX ORDER — FENTANYL CITRATE 50 UG/ML
INJECTION, SOLUTION INTRAMUSCULAR; INTRAVENOUS
Status: DISCONTINUED | OUTPATIENT
Start: 2025-06-02 | End: 2025-06-02 | Stop reason: SDUPTHER

## 2025-06-02 RX ORDER — PROPOFOL 10 MG/ML
INJECTION, EMULSION INTRAVENOUS
Status: DISCONTINUED | OUTPATIENT
Start: 2025-06-02 | End: 2025-06-02 | Stop reason: SDUPTHER

## 2025-06-02 RX ORDER — SENNA AND DOCUSATE SODIUM 50; 8.6 MG/1; MG/1
1 TABLET, FILM COATED ORAL 2 TIMES DAILY
Status: DISCONTINUED | OUTPATIENT
Start: 2025-06-02 | End: 2025-06-03 | Stop reason: HOSPADM

## 2025-06-02 RX ORDER — POLYETHYLENE GLYCOL 3350 17 G/17G
17 POWDER, FOR SOLUTION ORAL DAILY
Status: DISCONTINUED | OUTPATIENT
Start: 2025-06-02 | End: 2025-06-03 | Stop reason: HOSPADM

## 2025-06-02 RX ORDER — DEXAMETHASONE SODIUM PHOSPHATE 4 MG/ML
INJECTION, SOLUTION INTRA-ARTICULAR; INTRALESIONAL; INTRAMUSCULAR; INTRAVENOUS; SOFT TISSUE
Status: DISCONTINUED | OUTPATIENT
Start: 2025-06-02 | End: 2025-06-02 | Stop reason: SDUPTHER

## 2025-06-02 RX ORDER — ONDANSETRON 2 MG/ML
4 INJECTION INTRAMUSCULAR; INTRAVENOUS ONCE
Status: COMPLETED | OUTPATIENT
Start: 2025-06-02 | End: 2025-06-02

## 2025-06-02 RX ORDER — CARVEDILOL 12.5 MG/1
12.5 TABLET ORAL 2 TIMES DAILY
Status: DISCONTINUED | OUTPATIENT
Start: 2025-06-02 | End: 2025-06-03 | Stop reason: HOSPADM

## 2025-06-02 RX ORDER — SODIUM CHLORIDE 9 MG/ML
INJECTION, SOLUTION INTRAVENOUS PRN
Status: DISCONTINUED | OUTPATIENT
Start: 2025-06-02 | End: 2025-06-03 | Stop reason: HOSPADM

## 2025-06-02 RX ORDER — SODIUM CHLORIDE 0.9 % (FLUSH) 0.9 %
5-40 SYRINGE (ML) INJECTION EVERY 12 HOURS SCHEDULED
Status: DISCONTINUED | OUTPATIENT
Start: 2025-06-02 | End: 2025-06-02

## 2025-06-02 RX ORDER — EZETIMIBE 10 MG/1
10 TABLET ORAL EVERY EVENING
Status: DISCONTINUED | OUTPATIENT
Start: 2025-06-02 | End: 2025-06-03 | Stop reason: HOSPADM

## 2025-06-02 RX ORDER — ONDANSETRON 2 MG/ML
INJECTION INTRAMUSCULAR; INTRAVENOUS
Status: DISCONTINUED | OUTPATIENT
Start: 2025-06-02 | End: 2025-06-02 | Stop reason: SDUPTHER

## 2025-06-02 RX ORDER — LEVOTHYROXINE SODIUM 100 UG/1
100 TABLET ORAL DAILY
Status: DISCONTINUED | OUTPATIENT
Start: 2025-06-03 | End: 2025-06-03 | Stop reason: HOSPADM

## 2025-06-02 RX ORDER — SODIUM CHLORIDE 0.9 % (FLUSH) 0.9 %
5-40 SYRINGE (ML) INJECTION PRN
Status: DISCONTINUED | OUTPATIENT
Start: 2025-06-02 | End: 2025-06-02

## 2025-06-02 RX ORDER — FENTANYL CITRATE 50 UG/ML
50 INJECTION, SOLUTION INTRAMUSCULAR; INTRAVENOUS EVERY 5 MIN PRN
Status: DISCONTINUED | OUTPATIENT
Start: 2025-06-02 | End: 2025-06-02 | Stop reason: HOSPADM

## 2025-06-02 RX ORDER — EPHEDRINE SULFATE/0.9% NACL/PF 25 MG/5 ML
SYRINGE (ML) INTRAVENOUS
Status: DISCONTINUED | OUTPATIENT
Start: 2025-06-02 | End: 2025-06-02 | Stop reason: SDUPTHER

## 2025-06-02 RX ORDER — VENLAFAXINE 37.5 MG/1
75 TABLET ORAL
Status: DISCONTINUED | OUTPATIENT
Start: 2025-06-02 | End: 2025-06-03 | Stop reason: HOSPADM

## 2025-06-02 RX ORDER — FERROUS SULFATE 325(65) MG
325 TABLET ORAL
Status: DISCONTINUED | OUTPATIENT
Start: 2025-06-03 | End: 2025-06-03 | Stop reason: HOSPADM

## 2025-06-02 RX ORDER — ONDANSETRON 2 MG/ML
4 INJECTION INTRAMUSCULAR; INTRAVENOUS EVERY 6 HOURS PRN
Status: DISCONTINUED | OUTPATIENT
Start: 2025-06-02 | End: 2025-06-03 | Stop reason: HOSPADM

## 2025-06-02 RX ORDER — AMLODIPINE BESYLATE 5 MG/1
10 TABLET ORAL DAILY
Status: DISCONTINUED | OUTPATIENT
Start: 2025-06-03 | End: 2025-06-03 | Stop reason: HOSPADM

## 2025-06-02 RX ORDER — MELOXICAM 7.5 MG/1
15 TABLET ORAL EVERY EVENING
Status: DISCONTINUED | OUTPATIENT
Start: 2025-06-02 | End: 2025-06-03 | Stop reason: HOSPADM

## 2025-06-02 RX ORDER — SODIUM CHLORIDE 0.9 % (FLUSH) 0.9 %
5-40 SYRINGE (ML) INJECTION PRN
Status: DISCONTINUED | OUTPATIENT
Start: 2025-06-02 | End: 2025-06-03 | Stop reason: HOSPADM

## 2025-06-02 RX ADMIN — Medication 100 MG: at 07:31

## 2025-06-02 RX ADMIN — HYDROMORPHONE HYDROCHLORIDE 0.3 MG: 1 INJECTION, SOLUTION INTRAMUSCULAR; INTRAVENOUS; SUBCUTANEOUS at 09:52

## 2025-06-02 RX ADMIN — SODIUM CHLORIDE, SODIUM LACTATE, POTASSIUM CHLORIDE, AND CALCIUM CHLORIDE 25 ML: .6; .31; .03; .02 INJECTION, SOLUTION INTRAVENOUS at 07:15

## 2025-06-02 RX ADMIN — PROPOFOL 125 MCG/KG/MIN: 10 INJECTION, EMULSION INTRAVENOUS at 07:35

## 2025-06-02 RX ADMIN — EZETIMIBE 10 MG: 10 TABLET ORAL at 18:29

## 2025-06-02 RX ADMIN — SODIUM CHLORIDE, PRESERVATIVE FREE 10 ML: 5 INJECTION INTRAVENOUS at 14:28

## 2025-06-02 RX ADMIN — VENLAFAXINE HYDROCHLORIDE 75 MG: 37.5 TABLET ORAL at 23:38

## 2025-06-02 RX ADMIN — EPHEDRINE SULFATE 5 MG: 5 INJECTION INTRAVENOUS at 07:53

## 2025-06-02 RX ADMIN — DOCUSATE SODIUM 50MG AND SENNOSIDES 8.6MG 1 TABLET: 8.6; 5 TABLET, FILM COATED ORAL at 23:38

## 2025-06-02 RX ADMIN — FENTANYL CITRATE 50 MCG: 50 INJECTION, SOLUTION INTRAMUSCULAR; INTRAVENOUS at 07:52

## 2025-06-02 RX ADMIN — WATER 2000 MG: 1 INJECTION INTRAMUSCULAR; INTRAVENOUS; SUBCUTANEOUS at 07:40

## 2025-06-02 RX ADMIN — DEXAMETHASONE SODIUM PHOSPHATE 6 MG: 4 INJECTION INTRA-ARTICULAR; INTRALESIONAL; INTRAMUSCULAR; INTRAVENOUS; SOFT TISSUE at 07:40

## 2025-06-02 RX ADMIN — PROCHLORPERAZINE EDISYLATE 2.5 MG: 5 INJECTION INTRAMUSCULAR; INTRAVENOUS at 11:00

## 2025-06-02 RX ADMIN — EPHEDRINE SULFATE 5 MG: 5 INJECTION INTRAVENOUS at 08:25

## 2025-06-02 RX ADMIN — FAMOTIDINE 20 MG: 10 INJECTION, SOLUTION INTRAVENOUS at 10:42

## 2025-06-02 RX ADMIN — ONDANSETRON 4 MG: 2 INJECTION INTRAMUSCULAR; INTRAVENOUS at 07:26

## 2025-06-02 RX ADMIN — PHENYLEPHRINE HYDROCHLORIDE 20 MCG/MIN: 10 INJECTION INTRAVENOUS at 07:35

## 2025-06-02 RX ADMIN — HYDROMORPHONE HYDROCHLORIDE 0.3 MG: 1 INJECTION, SOLUTION INTRAMUSCULAR; INTRAVENOUS; SUBCUTANEOUS at 08:49

## 2025-06-02 RX ADMIN — LIDOCAINE HYDROCHLORIDE 100 MG: 20 INJECTION, SOLUTION EPIDURAL; INFILTRATION; INTRACAUDAL; PERINEURAL at 07:31

## 2025-06-02 RX ADMIN — ROCURONIUM BROMIDE 50 MG: 10 INJECTION INTRAVENOUS at 07:44

## 2025-06-02 RX ADMIN — MELOXICAM 15 MG: 7.5 TABLET ORAL at 18:30

## 2025-06-02 RX ADMIN — ONDANSETRON 4 MG: 2 INJECTION, SOLUTION INTRAMUSCULAR; INTRAVENOUS at 10:42

## 2025-06-02 RX ADMIN — FENTANYL CITRATE 50 MCG: 50 INJECTION, SOLUTION INTRAMUSCULAR; INTRAVENOUS at 07:30

## 2025-06-02 RX ADMIN — SUGAMMADEX 200 MG: 100 INJECTION, SOLUTION INTRAVENOUS at 10:01

## 2025-06-02 RX ADMIN — HYDROMORPHONE HYDROCHLORIDE 0.4 MG: 1 INJECTION, SOLUTION INTRAMUSCULAR; INTRAVENOUS; SUBCUTANEOUS at 09:59

## 2025-06-02 RX ADMIN — GABAPENTIN 1200 MG: 300 CAPSULE ORAL at 23:38

## 2025-06-02 RX ADMIN — ROCURONIUM BROMIDE 30 MG: 10 INJECTION INTRAVENOUS at 08:30

## 2025-06-02 RX ADMIN — PROPOFOL 100 MG: 10 INJECTION, EMULSION INTRAVENOUS at 07:31

## 2025-06-02 RX ADMIN — OXYCODONE HYDROCHLORIDE 5 MG: 5 TABLET ORAL at 23:42

## 2025-06-02 RX ADMIN — DOCUSATE SODIUM 50MG AND SENNOSIDES 8.6MG 1 TABLET: 8.6; 5 TABLET, FILM COATED ORAL at 14:27

## 2025-06-02 RX ADMIN — CARVEDILOL 12.5 MG: 12.5 TABLET, FILM COATED ORAL at 23:39

## 2025-06-02 RX ADMIN — POTASSIUM CHLORIDE 10 MEQ: 1500 TABLET, EXTENDED RELEASE ORAL at 23:41

## 2025-06-02 RX ADMIN — SODIUM CHLORIDE, PRESERVATIVE FREE 10 ML: 5 INJECTION INTRAVENOUS at 23:47

## 2025-06-02 RX ADMIN — FENTANYL CITRATE 50 MCG: 50 INJECTION INTRAMUSCULAR; INTRAVENOUS at 11:00

## 2025-06-02 RX ADMIN — ROCURONIUM BROMIDE 10 MG: 10 INJECTION INTRAVENOUS at 09:42

## 2025-06-02 RX ADMIN — ACETAMINOPHEN 650 MG: 325 TABLET ORAL at 23:42

## 2025-06-02 RX ADMIN — SODIUM CHLORIDE, POTASSIUM CHLORIDE, SODIUM LACTATE AND CALCIUM CHLORIDE: 600; 310; 30; 20 INJECTION, SOLUTION INTRAVENOUS at 07:26

## 2025-06-02 RX ADMIN — POLYETHYLENE GLYCOL 3350 17 G: 17 POWDER, FOR SOLUTION ORAL at 14:27

## 2025-06-02 RX ADMIN — ACETAMINOPHEN 650 MG: 325 TABLET ORAL at 14:27

## 2025-06-02 ASSESSMENT — PAIN DESCRIPTION - LOCATION
LOCATION: ABDOMEN

## 2025-06-02 ASSESSMENT — PAIN SCALES - GENERAL
PAINLEVEL_OUTOF10: 5
PAINLEVEL_OUTOF10: 3
PAINLEVEL_OUTOF10: 7
PAINLEVEL_OUTOF10: 5

## 2025-06-02 ASSESSMENT — PAIN - FUNCTIONAL ASSESSMENT
PAIN_FUNCTIONAL_ASSESSMENT: ACTIVITIES ARE NOT PREVENTED
PAIN_FUNCTIONAL_ASSESSMENT: 0-10

## 2025-06-02 ASSESSMENT — PAIN DESCRIPTION - DESCRIPTORS
DESCRIPTORS: SORE
DESCRIPTORS: ACHING
DESCRIPTORS: ACHING;SORE
DESCRIPTORS: ACHING

## 2025-06-02 ASSESSMENT — PAIN DESCRIPTION - ORIENTATION
ORIENTATION: ANTERIOR

## 2025-06-02 ASSESSMENT — PAIN DESCRIPTION - PAIN TYPE
TYPE: SURGICAL PAIN
TYPE: SURGICAL PAIN

## 2025-06-02 NOTE — PERIOP NOTE
Daughter  set  up for  text  message.    Pt  has  not  been around  anyone  with infectious   disease.   Voided   x1 in holding  area  mepilex  sacrum border preventatively applied.   Skin intact  .   Pt   ready  and   awaiting   surgery  call bell in reach.

## 2025-06-02 NOTE — PLAN OF CARE
Problem: Safety - Adult  Goal: Free from fall injury  Outcome: Progressing     Problem: ABCDS Injury Assessment  Goal: Absence of physical injury  Outcome: Progressing     Problem: Pain  Goal: Verbalizes/displays adequate comfort level or baseline comfort level  Outcome: Progressing  Flowsheets (Taken 6/2/2025 1147)  Verbalizes/displays adequate comfort level or baseline comfort level:   Encourage patient to monitor pain and request assistance   Assess pain using appropriate pain scale   Administer analgesics based on type and severity of pain and evaluate response   Implement non-pharmacological measures as appropriate and evaluate response   Consider cultural and social influences on pain and pain management   Notify Licensed Independent Practitioner if interventions unsuccessful or patient reports new pain     Problem: Discharge Planning  Goal: Discharge to home or other facility with appropriate resources  Outcome: Progressing  Flowsheets (Taken 6/2/2025 1200)  Discharge to home or other facility with appropriate resources:   Identify barriers to discharge with patient and caregiver   Arrange for needed discharge resources and transportation as appropriate   Identify discharge learning needs (meds, wound care, etc)   Refer to discharge planning if patient needs post-hospital services based on physician order or complex needs related to functional status, cognitive ability or social support system     Problem: Gastrointestinal - Adult  Goal: Minimal or absence of nausea and vomiting  Outcome: Progressing  Goal: Maintains or returns to baseline bowel function  Outcome: Progressing  Goal: Maintains adequate nutritional intake  Outcome: Progressing  Goal: Establish and maintain optimal ostomy function  Outcome: Progressing     Problem: Infection - Adult  Goal: Absence of infection at discharge  Outcome: Progressing  Goal: Absence of infection during hospitalization  Outcome: Progressing  Goal: Absence of

## 2025-06-02 NOTE — ANESTHESIA POSTPROCEDURE EVALUATION
Department of Anesthesiology  Postprocedure Note    Patient: Malena Avila  MRN: [id number]  YOB: 1950  Date of evaluation: 6/2/2025    Procedure Summary       Date: 06/02/25 Room / Location: [facility] MAIN OR M1 / [facility] MAIN OR    Anesthesia Start: 0726 Anesthesia Stop: 1022    Procedure: ROBOTIC PARAESOPHAGEAL HERNIA REPAIR WITH MESH AND TOUPET FUNDOPLICATION (Abdomen) Diagnosis:       Paraesophageal hernia      (Paraesophageal hernia [K44.9])    Providers: Milagro Xiao MD Responsible Provider: Peter Miranda MD    Anesthesia Type: General, TIVA ASA Status: 2            Anesthesia Type: General, TIVA    Corey Phase I: Corey Score: 8    Corey Phase II:      Anesthesia Post Evaluation    Patient location during evaluation: PACU  Patient participation: complete - patient participated  Level of consciousness: sleepy but conscious and responsive to verbal stimuli  Airway patency: patent  Nausea & Vomiting: no vomiting and no nausea  Cardiovascular status: blood pressure returned to baseline and hemodynamically stable  Respiratory status: acceptable  Hydration status: stable  Pain management: adequate    No notable events documented.

## 2025-06-02 NOTE — H&P
Subjective:   6/2/25: Patient presents for surgery    4/22/25: Patient reports she is ready to schedule surgery.     3/19/25: Patient reports eating - not eating well. Still has persistent heartburn. 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 Dooley. She completed the manometry on 1/29.    1/29/25: Esophageal manometry:  70% ineffective swallows, ineffective esophageal motility    1/15/25:    Malena Avila is a 75 y.o. female who presents for evaluation of GERD. She has been on omeprazole for years. She follows up with Dr. Griffin. She reports epigastric burning pain and regurgitation. About the last 6 months, its been getting worse. She starts to eat and gets stuck. She would have severe pain and last 4-5 hours. She would take galvescon, pepto, ginger ale, nothing worked. Doesn't wake up at night coughing. She sleeps propped up. No voice hoarseness. .       1/14/25: EGD with Dr. Griffin: Findings:    -Normal esophageal mucosa without obstruction; biopsied to exclude inflammation  -Sliding medium-sized 5cm hiatal hernia from 36-41cm; biopsied obtained to exclude inflammation  -Normal stomach mucosa  -Normal duodenal mucosa    12/30/24: Barium Swallow: IMPRESSION:  1. Moderate to large paraesophageal hernia.  2. 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  3. Tertiary contractions associated.    Past Medical History:   Diagnosis Date    Acid reflux     Adverse effect of anesthesia     slow to wake after surgery    Allergic rhinitis     Anxiety No idea    Arrhythmia     MVP    Arthritis     Autoimmune disease     lichen planus    CAD (coronary artery disease)     MVP,- f/b PCP    Fibromyalgia     GERD (gastroesophageal reflux disease)     Hypertension     Hypothyroidism     Menopause     Nausea & vomiting     Obesity     Osteoarthritis 2000    Pancreatitis     Plantar fasciitis     PONV (postoperative nausea and

## 2025-06-02 NOTE — PROGRESS NOTES
End of Shift Note    1125: Verbal transfer report given to Mandy Lima RN by Lex Monroe RN in PACU. Report included the following information Nurse Handoff Report, Index, Adult Overview, Surgery Report, Intake/Output, MAR, Recent Results, Med Rec Status, Cardiac Rhythm SR, Alarm Parameters, Quality Measures, and Neuro Assessment.     1145: Pt arrived in stable condition with all pt belongings. Pt is in no acute distress on 2 L NC. Daughter at the bedside.    1200: Assessment completed. Pt repositioned in bed.     1400: Pt repositioned in bed.     1600: Reassessment completed. Pt up to bathroom and ambulated in hallway and return to chair. 1x void.     1800: Pt repositioned in chair.     1925: Bedside and Verbal shift change report given to Barbie Wen LPN (oncoming nurse) by Mandy Lima RN (offgoing nurse). Report included the following information Nurse Handoff Report, Index, Adult Overview, Surgery Report, Intake/Output, MAR, Recent Results, Med Rec Status, Alarm Parameters, Quality Measures, and Neuro Assessment.     Shift worked:  Day     Shift summary and any significant changes:     Voided in bathroom, ambulated in hallway, up to chair for dinner, utilized IS, pain managed with pain regimen.     Concerns for physician to address:  Discharge planning     Zone phone for oncoming shift:   [phone]       Activity:  Level of Assistance: Minimal assist, patient does 75% or more  Number times ambulated in hallways past shift: 2  Number of times OOB to chair past shift: 2    Cardiac:   Cardiac Monitoring: Not applicable      Cardiac Rhythm: Sinus rhythm    Access:  Current line(s): PIV    Genitourinary:   Urinary Status: Has not voided    Respiratory:   O2 Device: Nasal cannula  Chronic home O2 use?: NO  Incentive spirometer at bedside: YES    GI:  Last BM (including prior to admit):  (PTA)  Current diet:  ADULT DIET; Full Liquid; No Carbonated Beverages  ADULT ORAL NUTRITION SUPPLEMENT; Breakfast, Lunch, Dinner;

## 2025-06-02 NOTE — OP NOTE
OPERATIVE REPORT      PREOPERATIVE DIAGNOSIS: Paraesophageal hernia.    POSTOPERATIVE DIAGNOSIS: Paraesophageal hernia.    PROCEDURES PERFORMED:  Laparoscopic paraesophageal hernia repair with Toupet fundoplication and implantation of biologic mesh overlay.    SURGEON:  Milagro Xiao MD    OR STAFF:  Circulator: Emma Warren RN  Surgical Assistant: Lily Krishnamurthy  Scrub Person First: Ashley Morgan    ANESTHESIA: General plus local.    ESTIMATED BLOOD LOSS: Minimal.    SPECIMENS REMOVED: portion of hernia sac    DRAINS: None.    COMPLICATIONS: None.    FINDINGS: The patient is noted to have a large paraesophageal hernia with the fundus and body of the stomach herniated  anterior to the esophagus. After reducing the sac, seven centimeters of intraabdominal esophagus was able to be obtained. The hernia sac was fully reduced. The pleural cavity was not entered. The remaining abdominal cavity appeared unremarkable.     INDICATION: The patient has a long-standing history of a paraesophageal hernia. She is refractory to full medical management. Risks, benefits and alternatives were discussed regarding the surgery as noted above and consent form was placed in chart.    DESCRIPTION OF PROCEDURE:  After satisfactory induction of general anesthesia, the patient was kept in the supine position. The patient's abdomen was prepped and draped sterilely. After infusing a local anesthetic, an 8 mm optical entry trocar was placed intra-abdominally under visualization. Pneumoperitoneum achieved. The abdomen was explored with  findings noted above. A total of 5, 8 mm, robotic ports were placed across the abdomen and an additional incision in the epigastrium for the liver retractor. The patient was placed in reverse Trendelenburg position. The robot was docked and robotic instruments inserted. The stomach was gently retracted inferiorly, pulling the body and the fundus out of the chest. I began on the left side and

## 2025-06-02 NOTE — FLOWSHEET NOTE
06/02/25 0801   Family Communication    Relationship to Patient Daughter    Phone Number Ana, [phone]   Family/Significant Other Update Updated   Delivery Origin Nurse   Family Communication   Family Update Message Procedure started;Patient stable

## 2025-06-03 VITALS
DIASTOLIC BLOOD PRESSURE: 79 MMHG | WEIGHT: 174.82 LBS | BODY MASS INDEX: 32.17 KG/M2 | OXYGEN SATURATION: 96 % | RESPIRATION RATE: 18 BRPM | HEIGHT: 62 IN | TEMPERATURE: 97.7 F | SYSTOLIC BLOOD PRESSURE: 143 MMHG | HEART RATE: 68 BPM

## 2025-06-03 PROCEDURE — 2700000000 HC OXYGEN THERAPY PER DAY

## 2025-06-03 PROCEDURE — 94760 N-INVAS EAR/PLS OXIMETRY 1: CPT

## 2025-06-03 PROCEDURE — 6360000002 HC RX W HCPCS: Performed by: SURGERY

## 2025-06-03 PROCEDURE — 6370000000 HC RX 637 (ALT 250 FOR IP): Performed by: SURGERY

## 2025-06-03 RX ORDER — OXYCODONE HYDROCHLORIDE 5 MG/1
5 TABLET ORAL EVERY 6 HOURS PRN
Qty: 12 TABLET | Refills: 0 | Status: SHIPPED | OUTPATIENT
Start: 2025-06-03 | End: 2025-06-06

## 2025-06-03 RX ORDER — ONDANSETRON 4 MG/1
4 TABLET, FILM COATED ORAL EVERY 8 HOURS PRN
Qty: 15 TABLET | Refills: 0 | Status: SHIPPED | OUTPATIENT
Start: 2025-06-03

## 2025-06-03 RX ADMIN — CARVEDILOL 12.5 MG: 12.5 TABLET, FILM COATED ORAL at 09:19

## 2025-06-03 RX ADMIN — POLYETHYLENE GLYCOL 3350 17 G: 17 POWDER, FOR SOLUTION ORAL at 09:18

## 2025-06-03 RX ADMIN — AMLODIPINE BESYLATE 10 MG: 5 TABLET ORAL at 09:19

## 2025-06-03 RX ADMIN — ACETAMINOPHEN 650 MG: 325 TABLET ORAL at 09:19

## 2025-06-03 RX ADMIN — LOSARTAN POTASSIUM 100 MG: 100 TABLET, FILM COATED ORAL at 09:19

## 2025-06-03 RX ADMIN — ENOXAPARIN SODIUM 40 MG: 100 INJECTION SUBCUTANEOUS at 09:18

## 2025-06-03 RX ADMIN — FUROSEMIDE 40 MG: 40 TABLET ORAL at 09:19

## 2025-06-03 RX ADMIN — GABAPENTIN 1200 MG: 300 CAPSULE ORAL at 09:20

## 2025-06-03 RX ADMIN — DOCUSATE SODIUM 50MG AND SENNOSIDES 8.6MG 1 TABLET: 8.6; 5 TABLET, FILM COATED ORAL at 09:19

## 2025-06-03 RX ADMIN — FERROUS SULFATE TAB 325 MG (65 MG ELEMENTAL FE) 325 MG: 325 (65 FE) TAB at 09:20

## 2025-06-03 RX ADMIN — LEVOTHYROXINE SODIUM 100 MCG: 0.1 TABLET ORAL at 06:37

## 2025-06-03 RX ADMIN — VENLAFAXINE HYDROCHLORIDE 150 MG: 37.5 CAPSULE, EXTENDED RELEASE ORAL at 09:26

## 2025-06-03 ASSESSMENT — PAIN SCALES - GENERAL
PAINLEVEL_OUTOF10: 3
PAINLEVEL_OUTOF10: 1

## 2025-06-03 ASSESSMENT — PAIN DESCRIPTION - LOCATION: LOCATION: ABDOMEN

## 2025-06-03 ASSESSMENT — PAIN DESCRIPTION - ORIENTATION: ORIENTATION: MID

## 2025-06-03 ASSESSMENT — PAIN DESCRIPTION - DESCRIPTORS: DESCRIPTORS: ACHING

## 2025-06-03 NOTE — DISCHARGE SUMMARY
Physician Discharge Summary     Patient ID:  Malena GARCIA [facility]  [id number]  75 y.o.  1950    Admit date: 6/2/2025    Discharge date and time: No discharge date for patient encounter.     Admitting Physician: Milagro Xiao MD     Discharge Physician: Milagro Xiao MD      Admission Diagnoses: Paraesophageal hernia [K44.9]  Paraesophageal hernia with gastroesophageal reflux [K44.9, K21.9]    Discharge Diagnoses: Same    Admission Condition: good    Discharged Condition: good    Indication for Admission: s/p surgery    Hospital Course: Patient is POD 1 s/p robotic paraesophageal hernia repair with mesh and toupet fundoplication. Tolerating full liquids. Pain is controlled. Able to belch. Discharged home    Consults: none    Significant Diagnostic Studies: N/A    Treatments: surgery: as above    Discharge Exam:  Physical Exam:  General:  Alert, cooperative, no distress, appears stated age.   Eyes:  Conjunctivae/corneas clear.    Ears:  Normal external ear canals both ears.   Nose: Nares normal. Septum midline.    Mouth/Throat: Lips, mucosa, and tongue normal. Teeth and gums normal.   Neck: Supple, symmetrical, trachea midline   Back:   Symmetric, no curvature. ROM normal.    Lungs:   Clear to auscultation bilaterally.   Heart:  Regular rate and rhythm   Abdomen:   Soft, non-tender. Bowel sounds normal. No masses,  No organomegaly. Incisions c/d/i   Extremities: Extremities normal, atraumatic, no cyanosis or edema.   Skin: Skin color, texture, turgor normal. No rashes or lesions         Disposition: home    In process/preliminary results:  Outstanding Order Results       No orders found for last 30 day(s).            Patient Instructions:   Current Discharge Medication List        START taking these medications    Details   oxyCODONE (ROXICODONE) 5 MG immediate release tablet Take 1 tablet by mouth every 6 hours as needed for Pain for up to 3 days. Intended supply: 3 days. Take lowest dose possible to

## 2025-06-03 NOTE — PLAN OF CARE
Problem: Safety - Adult  Goal: Free from fall injury  Outcome: Progressing     Problem: ABCDS Injury Assessment  Goal: Absence of physical injury  Outcome: Progressing     Problem: Pain  Goal: Verbalizes/displays adequate comfort level or baseline comfort level  6/3/2025 1141 by LOLITA MORRELL  Outcome: Progressing  6/3/2025 0353 by Barbie Wen LPN  Outcome: Progressing     Problem: Discharge Planning  Goal: Discharge to home or other facility with appropriate resources  Outcome: Progressing     Problem: Gastrointestinal - Adult  Goal: Minimal or absence of nausea and vomiting  Outcome: Progressing  Goal: Maintains or returns to baseline bowel function  Outcome: Progressing  Goal: Maintains adequate nutritional intake  Outcome: Progressing  Goal: Establish and maintain optimal ostomy function  Outcome: Progressing     Problem: Infection - Adult  Goal: Absence of infection at discharge  Outcome: Progressing  Goal: Absence of infection during hospitalization  Outcome: Progressing  Goal: Absence of fever/infection during anticipated neutropenic period  Outcome: Progressing     Problem: Chronic Conditions and Co-morbidities  Goal: Patient's chronic conditions and co-morbidity symptoms are monitored and maintained or improved  Outcome: Progressing     Problem: Neurosensory - Adult  Goal: Achieves stable or improved neurological status  Outcome: Progressing  Goal: Absence of seizures  Outcome: Progressing  Goal: Remains free of injury related to seizures activity  Outcome: Progressing  Goal: Achieves maximal functionality and self care  Outcome: Progressing     Problem: Respiratory - Adult  Goal: Achieves optimal ventilation and oxygenation  Outcome: Progressing     Problem: Skin/Tissue Integrity - Adult  Goal: Skin integrity remains intact  Outcome: Progressing  Goal: Incisions, wounds, or drain sites healing without S/S of infection  Outcome: Progressing  Goal: Oral mucous membranes remain intact  Outcome:

## 2025-06-03 NOTE — PROGRESS NOTES
DISCHARGE NOTE FROM SURGICAL-TELEMETRY NURSE    Patient determined to be stable for discharge by attending provider. I have reviewed the discharge instructions and follow-up appointments with the Patient. They verbalized understanding and all questions were answered to their satisfaction. No complaints or further questions were expressed.      Medications sent to pharmacy Appropriate educational materials and medication side effect teaching were provided.      PIV were removed prior to discharge.     Patient did not discharge with any line, kruse, or drain.    All personal items collected during admission were returned to the patient prior to discharge.    Post-op patient: Tiana Kingston RN

## 2025-06-03 NOTE — PROGRESS NOTES
End of Shift Note    Bedside shift change report given to Adina MIRANDA  (oncoming nurse) by Barbie Wen LPN (offgoing nurse).  Report included the following information SBAR, Intake/Output, MAR, Accordion, Recent Results, and Med Rec Status    Shift worked:  7p-7a     Shift summary and any significant changes:     Patient complaint of pain x1, See MAR. Patient ambulated in connolly x1. Pt voided 750ml of urine. Abdominal incisions are dry and intact. No labs ordered. Patient tolerating Full Liquid. 1L NC kept on for comfort as patient stated it allows her to breath better due to her deviated septum. Patient turned self heels elevated.      Concerns for physician to address: none     Zone phone for oncoming shift:   [phone]       Activity:  Level of Assistance: Minimal assist, patient does 75% or more  Number times ambulated in hallways past shift: 1  Number of times OOB to chair past shift: 0    Cardiac:   Cardiac Monitoring: No      Cardiac Rhythm: Sinus rhythm    Access:  Current line(s): PIV     Genitourinary:   Urinary Status: Voiding    Respiratory:   O2 Device: Nasal cannula  Chronic home O2 use?: NO  Incentive spirometer at bedside: YES    GI:  Last BM (including prior to admit): 06/01/25  Current diet:  ADULT DIET; Full Liquid; No Carbonated Beverages  ADULT ORAL NUTRITION SUPPLEMENT; Breakfast, Lunch, Dinner; Standard High Calorie/High Protein Oral Supplement  Passing flatus: YES    Pain Management:   Patient states pain is manageable on current regimen: YES    Skin:  John Scale Score: 20  Interventions: Wound Offloading (Prevention Methods): Elevate heels, Foam silicone, Repositioning, Pillows    Patient Safety:  Fall Risk: Nursing Judgement-Fall Risk High(Add Comments): No  Fall Risk Interventions  Nursing Judgement-Fall Risk High(Add Comments): No  Toilet Every 2 Hours-In Advance of Need: Yes  Hourly Visual Checks: In bed, Awake  Fall Visual Posted: Armband, Fall sign posted, Socks  Room Door Open: Deferred to

## 2025-06-03 NOTE — DISCHARGE INSTRUCTIONS
better as quickly as possible.    How can you care for yourself at home?    Activity  Rest when you feel tired. Getting enough sleep will help you recover.  Try to walk each day. Start out by walking a little more than you did the day before. Bit by bit, increase the amount you walk. Walking boosts blood flow and helps prevent pneumonia and constipation.  For about 4 to 6 weeks, avoid lifting objects heavier than about 15 pounds. This may include heavy grocery bags, a heavy briefcase or backpack, cat litter or dog food bags, a child, or a vacuum .  Do not do sit-ups or any exercise or activity that uses your belly muscles.  You can be active and do things around the house as you can tolerate it. Do not take part in any activity where you could be hit in the belly. This could be sports or playing with children.  You may shower. Pat your incisions dry. If you had an open surgery, you need to keep the incision dry until it begins to heal. Do not take baths until your doctor says it is okay.  You may drive when you are no longer taking pain medicine and can quickly move your foot from the gas pedal to the brake. You must also be able to sit comfortably for a long period of time, even if you do not plan to go far. You might get caught in traffic.  For a laparoscopic surgery, most people can go back to work or their normal routine in about 2 to 3 weeks, depending on their job. If you had an open surgery, it will probably take 4 to 6 weeks.    Medicines  Take pain medicines exactly as directed.  If the doctor gave you a prescription medicine for pain, take it as prescribed.  Do not take two or more pain medicines at the same time unless the doctor told you to. Many pain medicines contain acetaminophen, which is Tylenol. Too much acetaminophen (Tylenol) can be harmful.  Do not take aspirin (Carrol, Bufferin), ibuprofen (Motrin, Advil), or naproxen (Aleve) until your doctor says it is okay.  If you think your pain

## 2025-06-17 ENCOUNTER — OFFICE VISIT (OUTPATIENT)
Age: 75
End: 2025-06-17

## 2025-06-17 VITALS
HEART RATE: 76 BPM | OXYGEN SATURATION: 94 % | WEIGHT: 170 LBS | SYSTOLIC BLOOD PRESSURE: 106 MMHG | HEIGHT: 62 IN | TEMPERATURE: 97.9 F | BODY MASS INDEX: 31.28 KG/M2 | DIASTOLIC BLOOD PRESSURE: 67 MMHG

## 2025-06-17 DIAGNOSIS — K44.9 PARAESOPHAGEAL HERNIA WITH GASTROESOPHAGEAL REFLUX: Primary | ICD-10-CM

## 2025-06-17 DIAGNOSIS — K21.9 PARAESOPHAGEAL HERNIA WITH GASTROESOPHAGEAL REFLUX: Primary | ICD-10-CM

## 2025-06-17 PROCEDURE — 99024 POSTOP FOLLOW-UP VISIT: CPT | Performed by: SURGERY

## 2025-06-17 NOTE — PROGRESS NOTES
SUBJECTIVE: Malena Avila is a 75 y.o. female is seen for a routine postop check s/p robotic paraesophageal hernia repair with mesh and toupet fundoplication. Reports no problems with the wound or other issues.  Activity, diet and bowels are normal. No pain. No fevers. No preoperative symptoms. Minimal dysphagia on a soft diet.    OBJECTIVE:   There were no vitals filed for this visit.    General: Appears well.   Incision: healing well, no drainage, no erythema, no seroma, incision well approximated, no swelling    ASSESSMENT: normal postoperative course, doing well.    PLAN:   Wound care discussed  No heavy lifting for 6 weeks  Follow up 1 month

## 2025-06-17 NOTE — PROGRESS NOTES
Identified pt with two pt identifiers (name and ). Reviewed chart in preparation for visit and have obtained necessary documentation.    Malena Avila is a 75 y.o. female  Chief Complaint   Patient presents with    Post-Op Check     2wk robotic paraesophageal hernia repair with mesh and toupet fundoplication        /67 (BP Site: Left Upper Arm, Patient Position: Sitting, BP Cuff Size: Large Adult)   Pulse 76   Temp 97.9 °F (36.6 °C) (Temporal)   Ht 1.562 m (5' 1.5\")   Wt 77.1 kg (170 lb)   SpO2 94%   BMI 31.60 kg/m²     1. Have you been to the ER, urgent care clinic since your last visit?  Hospitalized since your last visit?no    2. Have you seen or consulted any other health care providers outside of the [facility] since your last visit?  Include any pap smears or colon screening. no    complains of pain/discomfort

## 2025-06-23 ENCOUNTER — TELEPHONE (OUTPATIENT)
Age: 75
End: 2025-06-23

## 2025-07-17 ENCOUNTER — OFFICE VISIT (OUTPATIENT)
Age: 75
End: 2025-07-17

## 2025-07-17 VITALS
HEIGHT: 62 IN | DIASTOLIC BLOOD PRESSURE: 81 MMHG | OXYGEN SATURATION: 96 % | SYSTOLIC BLOOD PRESSURE: 133 MMHG | TEMPERATURE: 97.9 F | WEIGHT: 174.8 LBS | BODY MASS INDEX: 32.17 KG/M2 | HEART RATE: 85 BPM

## 2025-07-17 DIAGNOSIS — K44.9 PARAESOPHAGEAL HERNIA WITH GASTROESOPHAGEAL REFLUX: Primary | ICD-10-CM

## 2025-07-17 DIAGNOSIS — K21.9 PARAESOPHAGEAL HERNIA WITH GASTROESOPHAGEAL REFLUX: Primary | ICD-10-CM

## 2025-07-17 PROCEDURE — 99024 POSTOP FOLLOW-UP VISIT: CPT | Performed by: SURGERY

## 2025-07-17 ASSESSMENT — PATIENT HEALTH QUESTIONNAIRE - PHQ9
1. LITTLE INTEREST OR PLEASURE IN DOING THINGS: NOT AT ALL
2. FEELING DOWN, DEPRESSED OR HOPELESS: NOT AT ALL
SUM OF ALL RESPONSES TO PHQ QUESTIONS 1-9: 0

## 2025-07-17 NOTE — PROGRESS NOTES
Identified pt with two pt identifiers (name and ). Reviewed chart in preparation for visit and have obtained necessary documentation.    Malena Avila is a 75 y.o. female  Chief Complaint   Patient presents with    Follow-up     1 month fu robotic paraesophageal hernia repair with mesh and toupet fundoplication        /81 (BP Site: Right Upper Arm, Patient Position: Sitting, BP Cuff Size: Large Adult)   Pulse 85   Temp 97.9 °F (36.6 °C) (Temporal)   Ht 1.562 m (5' 1.5\")   Wt 79.3 kg (174 lb 12.8 oz)   SpO2 96%   BMI 32.49 kg/m²     1. Have you been to the ER, urgent care clinic since your last visit?  Hospitalized since your last visit?no    2. Have you seen or consulted any other health care providers outside of the [facility] since your last visit?  Include any pap smears or colon screening. no

## 2025-07-17 NOTE — PROGRESS NOTES
SUBJECTIVE: Malena Avila is a 75 y.o. female is seen for a routine follow up. No heartburn. Very mild dysphagia. Preoperative symptoms resolved.     OBJECTIVE:   Vitals:    07/17/25 1006   BP: 133/81   Pulse: 85   Temp: 97.9 °F (36.6 °C)   SpO2: 96%       General: Appears well.   Incision: healing well, no drainage, no erythema, no seroma, incision well approximated, no swelling    ASSESSMENT: normal postoperative course, doing well.    PLAN:   Diet as tolerated  Activity as tolerated  Follow up 6 months

## (undated) DEVICE — 3.0MM PRECISION NEURO (MATCH HEAD)

## (undated) DEVICE — BLADE ES ELASTOMERIC COAT INSUL DURABLE BEND UPTO 90DEG

## (undated) DEVICE — DRAPE MON DISP FOR EXCELSIUSGPS ROBOTIC NAVIGATION PLATFRM

## (undated) DEVICE — GLOVE SURG SZ 8 L12IN FNGR THK79MIL GRN LTX FREE

## (undated) DEVICE — CATHETER IV 20GA L1.16IN OD1.0414-1.1176MM ID0.762-0.8382MM

## (undated) DEVICE — STERILE POLYISOPRENE POWDER-FREE SURGICAL GLOVES: Brand: PROTEXIS

## (undated) DEVICE — BITEBLOCK 54FR W/ DENT RIM BLOX

## (undated) DEVICE — Device

## (undated) DEVICE — KIT,1200CC CANISTER,3/16"X6' TUBING: Brand: MEDLINE INDUSTRIES, INC.

## (undated) DEVICE — SYR 20ML LL STRL LF --

## (undated) DEVICE — SUTURE VICRYL SZ 0 L36IN ABSRB UD L40MM CT 1/2 CIR TAPERPOINT J958H

## (undated) DEVICE — C-ARMOR C-ARM EQUIPMENT COVERS CLEAR STERILE UNIVERSAL FIT 12 PER CASE: Brand: C-ARMOR

## (undated) DEVICE — LIQUIBAND RAPID ADHESIVE 36/CS 0.8ML: Brand: MEDLINE

## (undated) DEVICE — COVIDIEN KENDALL DL DISPOSABLE 3 LEAD SY: Brand: MEDLINE RENEWAL

## (undated) DEVICE — LAMINECTOMY-MRMC: Brand: MEDLINE INDUSTRIES, INC.

## (undated) DEVICE — TROCAR LAP L100MM DIA5MM BLDELSS W/ STBL SL ENDOPATH XCEL

## (undated) DEVICE — SUTURE VCRL SZ 2-0 L18IN ABSRB UD CT-1 L36MM 1/2 CIR J839D

## (undated) DEVICE — SUTURE VICRYL + SZ 0 L27IN ABSRB VLT L26MM UR-6 5/8 CIR VCP603H

## (undated) DEVICE — ALCOHOL  RUBBING  70% ISOPROPYL  4-OZ

## (undated) DEVICE — GLOVE SURG SZ 75 L12IN FNGR THK79MIL GRN LTX FREE

## (undated) DEVICE — TOTAL TRAY, 16FR 10ML SIL FOLEY, URN: Brand: MEDLINE

## (undated) DEVICE — SEAL

## (undated) DEVICE — SYSTEM EVAC SMOKE LAPARSCOPIC

## (undated) DEVICE — ELECTRODE BLDE L4IN NONINSULATED EDGE

## (undated) DEVICE — VESSEL SEALER: Brand: ENDOWRIST

## (undated) DEVICE — FORCEP BX LG CAP 2.4 MMX120 CM W/ NDL YEL RADIAL JAW 4 DISP

## (undated) DEVICE — SUTURE PERMAHAND SZ 3-0 L30IN NONABSORBABLE BLK L26MM SH C017D

## (undated) DEVICE — SURGIFOAM SPNG SZ 100

## (undated) DEVICE — SYS SKIN CLOS 22CM -- DERMABOUND PRINEO

## (undated) DEVICE — STAPLER SKN FIX HD COUNT STRL -- SUB STAPLER35WA  6/CA $58.16

## (undated) DEVICE — ENDOSCOPIC KIT COMPLIANCE ENDOKIT

## (undated) DEVICE — TIP SUCT TRNSPAR RIB SURF STD BLB RIG NVENT W/ 5IN1 CONN DYND50138] MEDLINE INDUSTRIES INC]

## (undated) DEVICE — NDL JAMSHIDI 11G 6IN --

## (undated) DEVICE — HYPODERMIC SAFETY NEEDLE: Brand: MAGELLAN

## (undated) DEVICE — SYR 50ML LR LCK 1ML GRAD NSAF --

## (undated) DEVICE — SPONGE GZ W4XL4IN COT 12 PLY TYP VII WVN C FLD DSGN STERILE

## (undated) DEVICE — SET GRAV CK VLV NEEDLESS ST 3 GANGED 4WAY STPCOCK HI FLO 10

## (undated) DEVICE — SUTURE STRATAFIX SYMMETRIC SZ 1 L18IN ABSRB VLT CT1 L36CM SXPP1A404

## (undated) DEVICE — SUTURE PERMAHAND SZ 3-0 L30IN NONABSORBABLE BLK SILK BRAID A304H

## (undated) DEVICE — SUTURE PERMAHAND SZ 2-0 L30IN NONABSORBABLE BLK SILK W/O A305H

## (undated) DEVICE — SOLUTION IRRIG 1000ML H2O PIC PLAS SHATTERPROOF CONTAINER

## (undated) DEVICE — SOLUTION IRRIG 1000ML 0.9% SOD CHL USP POUR PLAS BTL

## (undated) DEVICE — DRAPE C ARM DISP FOR EXCELSIUSGPS ROBOTIC NAVIGATION PLATFRM

## (undated) DEVICE — SOLUTION ANTIFOG VIS SYS CLEARIFY LAPSCP

## (undated) DEVICE — NDL SPNE QNCKE 18GX3.5IN LF --

## (undated) DEVICE — SNAP KOVER: Brand: UNBRANDED

## (undated) DEVICE — SUTURE MONOCRYL SZ 4-0 L27IN ABSRB UD L19MM PS-2 1/2 CIR PRIM Y426H

## (undated) DEVICE — IV START KIT: Brand: MEDLINE

## (undated) DEVICE — SOLUTION IRRIG 1000ML STRL H2O USP PLAS POUR BTL

## (undated) DEVICE — 450 ML BOTTLE OF 0.05% CHLORHEXIDINE GLUCONATE IN 99.95% STERILE WATER FOR IRRIGATION, USP AND APPLICATOR.: Brand: IRRISEPT ANTIMICROBIAL WOUND LAVAGE

## (undated) DEVICE — WRAP SURG W1.31XL1.34M CARD FOR PT 165-172CM THERMOWRP

## (undated) DEVICE — PREP SKN CHLRAPRP APL 26ML STR --

## (undated) DEVICE — GOWN,SIRUS,NONRNF,SETINSLV,2XL,18/CS: Brand: MEDLINE

## (undated) DEVICE — DRAPE,LAP,CHOLE,W/TROUGHS,STERILE: Brand: MEDLINE

## (undated) DEVICE — DRAPE,CHEST,FENES,15X10,STERIL: Brand: MEDLINE

## (undated) DEVICE — GLOVE SURG SZ 85 L12IN FNGR THK79MIL GRN LTX FREE

## (undated) DEVICE — SUTURE PDS II SZ 2-0 L27IN ABSRB VLT SH L26MM 1/2 CIR Z317H

## (undated) DEVICE — 1LYRTR 16FR10ML100%SIL UMS SNP: Brand: MEDLINE INDUSTRIES, INC.

## (undated) DEVICE — GLOVE ORTHO 7 1/2   MSG9475

## (undated) DEVICE — SUTURE PERMAHAND SZ 3-0 L30IN NONABSORBABLE BLK SH L26MM K832H

## (undated) DEVICE — CONTAINER SPEC 20 ML LID NEUT BUFF FORMALIN 10 % POLYPR STS

## (undated) DEVICE — SUTURE PDS II SZ 1 L36IN ABSRB VLT L48MM CTX 1/2 CIR Z371T

## (undated) DEVICE — GENERAL LAPAROSCOPY-MRMC: Brand: MEDLINE INDUSTRIES, INC.

## (undated) DEVICE — MAJOR LAPAROTOMY-MRMC: Brand: MEDLINE INDUSTRIES, INC.

## (undated) DEVICE — GLOVE SURG SZ 75 CRM LTX FREE POLYISOPRENE POLYMER BEAD ANTI

## (undated) DEVICE — SPHERE STEALTH 12PK/TY --

## (undated) DEVICE — FLOSEAL MATRIX IS INDICATED IN SURGICAL PROCEDURES (OTHER THAN IN OPHTHALMIC) AS AN ADJUNCT TO HEMOSTASIS WHEN CONTROL OF BLEEDING BY LIGATURE OR CONVENTIONALPROCEDURES IS INEFFECTIVE OR IMPRACTICAL.: Brand: FLOSEAL HEMOSTATIC MATRIX

## (undated) DEVICE — SUTURE ETHIBOND EXCEL SZ 0 L36IN NONABSORBABLE GRN SH L26MM X524H

## (undated) DEVICE — BNDG ADH FABRIC 2X4IN ST LF --

## (undated) DEVICE — ARM DRAPE

## (undated) DEVICE — DRAPE FLD WRM W44XL66IN C6L FOR INTRATEMP SYS THERMABASIN

## (undated) DEVICE — CTI-512N CT CLOSESURE SYSTEM WHITE: Brand: MEDLINE

## (undated) DEVICE — BLADELESS OBTURATOR: Brand: WECK VISTA

## (undated) DEVICE — CUFF BLD PRSS AD CLTH SGL TB W/ BAYNT CONN ROUNDED CORNER

## (undated) DEVICE — TRANSFER SET 3": Brand: MEDLINE INDUSTRIES, INC.

## (undated) DEVICE — PICO 7 DUAL 20X20CM: Brand: PICO™ 7

## (undated) DEVICE — BLADE ES L6IN ELASTOMERIC COAT INSUL DURABLE BEND UPTO